# Patient Record
Sex: MALE | Race: WHITE | NOT HISPANIC OR LATINO | Employment: FULL TIME | URBAN - METROPOLITAN AREA
[De-identification: names, ages, dates, MRNs, and addresses within clinical notes are randomized per-mention and may not be internally consistent; named-entity substitution may affect disease eponyms.]

---

## 2017-09-29 ENCOUNTER — OFFICE VISIT (OUTPATIENT)
Dept: FAMILY MEDICINE | Facility: CLINIC | Age: 47
End: 2017-09-29

## 2017-09-29 VITALS
OXYGEN SATURATION: 98 % | TEMPERATURE: 98.4 F | RESPIRATION RATE: 16 BRPM | BODY MASS INDEX: 34.2 KG/M2 | HEART RATE: 52 BPM | SYSTOLIC BLOOD PRESSURE: 149 MMHG | WEIGHT: 273.6 LBS | DIASTOLIC BLOOD PRESSURE: 95 MMHG

## 2017-09-29 DIAGNOSIS — K57.32 DIVERTICULITIS OF COLON: Primary | ICD-10-CM

## 2017-09-29 DIAGNOSIS — R10.32 LLQ ABDOMINAL PAIN: ICD-10-CM

## 2017-09-29 LAB
% GRANULOCYTES: 54.3 %G (ref 40–75)
GRANULOCYTES #: 3.2 K/UL (ref 1.6–8.3)
HCT VFR BLD AUTO: 44.9 % (ref 40–53)
HEMOGLOBIN: 14.3 G/DL (ref 13.3–17.7)
LYMPHOCYTES # BLD AUTO: 2.2 K/UL (ref 0.8–5.3)
LYMPHOCYTES NFR BLD AUTO: 37.5 %L (ref 20–48)
MCH RBC QN AUTO: 28.3 PG (ref 26.5–35)
MCHC RBC AUTO-ENTMCNC: 31.8 G/DL (ref 32–36)
MCV RBC AUTO: 88.9 FL (ref 78–100)
MID #: 0.5 K/UL (ref 0–2.2)
MID %: 8.2 %M (ref 0–20)
PLATELET # BLD AUTO: 195 K/UL (ref 150–450)
RBC # BLD AUTO: 5.05 M/UL (ref 4.4–5.9)
WBC # BLD AUTO: 5.9 K/UL (ref 4–11)

## 2017-09-29 NOTE — PATIENT INSTRUCTIONS
Here is the plan from today's visit    1. Diverticulitis of colon  Monitor your symptoms at home.  If symptoms worsen, start antibiotics.  If continue to worsen after 48 hours on antibiotics go to ER.  If getting, complete the course.  If improved on its own, don't fill antibiotic  If no change in symptoms in 1-2 days, start antibiotics.  - amoxicillin-clavulanate (AUGMENTIN) 875-125 MG per tablet; Take 1 tablet by mouth 2 times daily for 14 days  Dispense: 28 tablet; Refill: 0    2. LLQ abdominal pain  - CBC with Diff Plt ([place])    Thank you for coming to [facility] today.  Lab Testing:  **If you had lab testing today and your results are reassuring or normal they will be mailed to you or sent through [employer] within 7 days.   **If the lab tests need quick action we will call you with the results.  The phone number we will call with results is # [phone] (home) [phone] (work). If this is not the best number please call our clinic and change the number.  Medication Refills:  If you need any refills please call your pharmacy and they will contact us.   If you need to  your refill at a new pharmacy, please contact the new pharmacy directly. The new pharmacy will help you get your medications transferred faster.   Scheduling:  If you have any concerns about today's visit or wish to schedule another appointment please call our office during normal business hours [phone] (8-5:00 M-F)  If a referral was made to a [facility] and you don't get a call from central scheduling please call [phone].  If a Mammogram was ordered for you at [facility] call [phone] to schedule or change your appointment.  If you had an XRay/CT/Ultrasound/MRI ordered the number is [phone] to schedule or change your radiology appointment.   Medical Concerns:  If you have urgent medical concerns please call [phone] at any time of the day.        Diverticulitis  What is  diverticulitis?   Diverticulitis is a problem that can happen if you have diverticula in your intestine. Diverticula are tiny pouches or weak areas that bulge out from the lining of the wall of the intestine. They look like small thumbs poking out of the side of the bowel. When you have diverticula in your intestines, it is called diverticulosis. When these pouches become inflamed, it is called diverticulitis.  You are more likely to have these pouches as you get older. About one-third of people over 50 and two-thirds of people over 80 have diverticulosis.  How does it occur?   It appears that the main cause of diverticular disease is too little fiber in the diet. Fiber is the part of fruits, vegetables, and grains that the body cannot digest. Fiber helps make stools soft and easy to pass. It helps prevent constipation. When you have constipation, your muscles strain to move stool that is too hard. The high pressure causes the weak spots in the colon to bulge out and become diverticula.  Diverticulitis occurs when diverticula become infected or inflamed. Doctors are not certain what causes the illness. It may begin when stool or bacteria are caught in the diverticula.  What are the symptoms?   Symptoms of diverticulitis may include:  alternating diarrhea and constipation   severe cramps in your lower left side that come and go   pain on the lower left side of the abdomen   chills or fever   nausea and vomiting   rectal bleeding.  How is it diagnosed?   Your healthcare provider will review your symptoms and examine you. You may have the following tests:  sigmoidoscopy (exam of the rectum and lower end of the large intestine with a thin, flexible, lighted tube)   colonoscopy (exam of most of the intestine with a thin, flexible, lighted tube)   barium enema or lower GI X-ray   blood tests.  How is it treated?   For an attack of acute diverticulitis, you may need to be hospitalized. Depending on how bad the attack is,  your treatment may include antibiotics, intravenous (IV) fluids, and nasogastric suction (a procedure that relieves pressure in the intestine).  If attacks are severe or frequent, you may need surgery. There are 2 types of surgery to correct the problem.   Colon resection. The area of the colon with the infected diverticula is removed and the remaining ends of the colon are sewn back together.   Colostomy. The colostomy is done to bypass the inflamed colon to help it heal. A colostomy attaches part of the healthy colon to an opening in the wall of the abdomen. Bowel movements then pass through this opening instead of the rectum. They are collected in a bag outside the body. After the colon has healed, the colostomy can be reversed. This means that you will have a second surgery to rejoin the ends of the colon to each other and will no longer have a colostomy.  How long will the effects last?   Diverticulitis is usually mild and should respond well to antibiotics and changes in diet.  How can I take care of myself?   Use a source of heat such as a hot water bottle for cramps.   If you have diarrhea, drink liquids and avoid solid foods. Try to rest until the diarrhea stops. When your symptoms are gone, eat soft, bland, low-fiber foods at first. Your healthcare provider will let you know when you should gradually begin eating a high-fiber diet.   Take all the medicine prescribed by your healthcare provider. If you stop taking antibiotics when your symptoms are gone but before the scheduled end of treatment, the symptoms may return.   If your symptoms worsen, contact your provider.  How can I help prevent recurrence of diverticulitis?   Follow your healthcare provider's prescribed treatment, including diet recommendations.   Once you are well, eat regular, nutritious meals containing high-fiber foods, such as fruits, vegetables, and whole-grain foods. Many people find fiber supplements, such as Metamucil, Citrucel, or  other psyllium products, to be helpful, but in a few cases they make constipation worse.   Drink plenty of water.   Watch for changes in your normal bowel pattern. If you are having problems with constipation or diarrhea, make an appointment to see your healthcare provider.   Get enough rest and sleep.   Exercise as recommended by your provider.   Watch to see if some foods seem to cause abdominal pain. Foods that are more likely to cause pain are popcorn kernels and other foods that may get stuck in diverticula, such as sunflower seeds, sesame seeds, and nuts. The seeds in tomatoes, zucchini, cucumbers, strawberries, and raspberries, as well as poppy seeds, are usually harmless. Keeping a food diary may help you remember what you ate a few hours before getting abdominal pain.   Contact your healthcare provider if your symptoms come back.     Published by [employer].  This content is reviewed periodically and is subject to change as new health information becomes available. The information is intended to inform and educate and is not a replacement for medical evaluation, advice, diagnosis or treatment by a healthcare professional.

## 2017-09-29 NOTE — MR AVS SNAPSHOT
After Visit Summary   9/29/2017    Lui Arrington    MRN: [id number]           Patient Information     Date Of Birth          1970        Visit Information        Provider Department      9/29/2017 8:40 AM John Blake MD [facility]        Today's Diagnoses     Diverticulitis of colon    -  1    LLQ abdominal pain          Care Instructions    Here is the plan from today's visit    1. Diverticulitis of colon  Monitor your symptoms at home.  If symptoms worsen, start antibiotics.  If continue to worsen after 48 hours on antibiotics go to ER.  If getting, complete the course.  If improved on its own, don't fill antibiotic  If no change in symptoms in 1-2 days, start antibiotics.  - amoxicillin-clavulanate (AUGMENTIN) 875-125 MG per tablet; Take 1 tablet by mouth 2 times daily for 14 days  Dispense: 28 tablet; Refill: 0    2. LLQ abdominal pain  - CBC with Diff Plt ([place])    Thank you for coming to [facility] today.  Lab Testing:  **If you had lab testing today and your results are reassuring or normal they will be mailed to you or sent through [employer] within 7 days.   **If the lab tests need quick action we will call you with the results.  The phone number we will call with results is # [phone] (home) [phone] (work). If this is not the best number please call our clinic and change the number.  Medication Refills:  If you need any refills please call your pharmacy and they will contact us.   If you need to  your refill at a new pharmacy, please contact the new pharmacy directly. The new pharmacy will help you get your medications transferred faster.   Scheduling:  If you have any concerns about today's visit or wish to schedule another appointment please call our office during normal business hours [phone] (8-5:00 M-F)  If a referral was made to a [facility] and you don't get a call from central scheduling please  call [phone].  If a Mammogram was ordered for you at [facility] call [phone] to schedule or change your appointment.  If you had an XRay/CT/Ultrasound/MRI ordered the number is [phone] to schedule or change your radiology appointment.   Medical Concerns:  If you have urgent medical concerns please call [phone] at any time of the day.        Diverticulitis  What is diverticulitis?   Diverticulitis is a problem that can happen if you have diverticula in your intestine. Diverticula are tiny pouches or weak areas that bulge out from the lining of the wall of the intestine. They look like small thumbs poking out of the side of the bowel. When you have diverticula in your intestines, it is called diverticulosis. When these pouches become inflamed, it is called diverticulitis.  You are more likely to have these pouches as you get older. About one-third of people over 50 and two-thirds of people over 80 have diverticulosis.  How does it occur?   It appears that the main cause of diverticular disease is too little fiber in the diet. Fiber is the part of fruits, vegetables, and grains that the body cannot digest. Fiber helps make stools soft and easy to pass. It helps prevent constipation. When you have constipation, your muscles strain to move stool that is too hard. The high pressure causes the weak spots in the colon to bulge out and become diverticula.  Diverticulitis occurs when diverticula become infected or inflamed. Doctors are not certain what causes the illness. It may begin when stool or bacteria are caught in the diverticula.  What are the symptoms?   Symptoms of diverticulitis may include:  alternating diarrhea and constipation   severe cramps in your lower left side that come and go   pain on the lower left side of the abdomen   chills or fever   nausea and vomiting   rectal bleeding.  How is it diagnosed?   Your healthcare provider will review your symptoms and examine you. You may  have the following tests:  sigmoidoscopy (exam of the rectum and lower end of the large intestine with a thin, flexible, lighted tube)   colonoscopy (exam of most of the intestine with a thin, flexible, lighted tube)   barium enema or lower GI X-ray   blood tests.  How is it treated?   For an attack of acute diverticulitis, you may need to be hospitalized. Depending on how bad the attack is, your treatment may include antibiotics, intravenous (IV) fluids, and nasogastric suction (a procedure that relieves pressure in the intestine).  If attacks are severe or frequent, you may need surgery. There are 2 types of surgery to correct the problem.   Colon resection. The area of the colon with the infected diverticula is removed and the remaining ends of the colon are sewn back together.   Colostomy. The colostomy is done to bypass the inflamed colon to help it heal. A colostomy attaches part of the healthy colon to an opening in the wall of the abdomen. Bowel movements then pass through this opening instead of the rectum. They are collected in a bag outside the body. After the colon has healed, the colostomy can be reversed. This means that you will have a second surgery to rejoin the ends of the colon to each other and will no longer have a colostomy.  How long will the effects last?   Diverticulitis is usually mild and should respond well to antibiotics and changes in diet.  How can I take care of myself?   Use a source of heat such as a hot water bottle for cramps.   If you have diarrhea, drink liquids and avoid solid foods. Try to rest until the diarrhea stops. When your symptoms are gone, eat soft, bland, low-fiber foods at first. Your healthcare provider will let you know when you should gradually begin eating a high-fiber diet.   Take all the medicine prescribed by your healthcare provider. If you stop taking antibiotics when your symptoms are gone but before the scheduled end of treatment, the symptoms may return.    If your symptoms worsen, contact your provider.  How can I help prevent recurrence of diverticulitis?   Follow your healthcare provider's prescribed treatment, including diet recommendations.   Once you are well, eat regular, nutritious meals containing high-fiber foods, such as fruits, vegetables, and whole-grain foods. Many people find fiber supplements, such as Metamucil, Citrucel, or other psyllium products, to be helpful, but in a few cases they make constipation worse.   Drink plenty of water.   Watch for changes in your normal bowel pattern. If you are having problems with constipation or diarrhea, make an appointment to see your healthcare provider.   Get enough rest and sleep.   Exercise as recommended by your provider.   Watch to see if some foods seem to cause abdominal pain. Foods that are more likely to cause pain are popcorn kernels and other foods that may get stuck in diverticula, such as sunflower seeds, sesame seeds, and nuts. The seeds in tomatoes, zucchini, cucumbers, strawberries, and raspberries, as well as poppy seeds, are usually harmless. Keeping a food diary may help you remember what you ate a few hours before getting abdominal pain.   Contact your healthcare provider if your symptoms come back.     Published by [employer].  This content is reviewed periodically and is subject to change as new health information becomes available. The information is intended to inform and educate and is not a replacement for medical evaluation, advice, diagnosis or treatment by a healthcare professional.                        Follow-ups after your visit        Who to contact     Please call your clinic at [phone] to:    Ask questions about your health    Make or cancel appointments    Discuss your medicines    Learn about your test results    Speak to your doctor   If you have compliments or concerns about an experience at your clinic, or if you wish to file a complaint, please contact [facility] Patient Relations at [phone] or email us at [email or web address]         Additional Information About Your Visit        [employer] Information     [employer] gives you secure access to your electronic health record. If you see a primary care provider, you can also send messages to your care team and make appointments. If you have questions, please call your primary care clinic.  If you do not have a primary care provider, please call [phone] and they will assist you.      [employer] is an electronic gateway that provides easy, online access to your medical records. With [employer], you can request a clinic appointment, read your test results, renew a prescription or communicate with your care team.     To access your existing account, please contact your [facility] or call [phone] for assistance.        Care EveryWhere ID     This is your Care EveryWhere ID. This could be used by other organizations to access your [place] medical records  [id number]        Your Vitals Were     Pulse Temperature Respirations Pulse Oximetry BMI (Body Mass Index)       52 98.4  F (36.9  C) (Oral) 16 98% 34.2 kg/m2        Blood Pressure from Last 3 Encounters:   09/29/17 (!) 149/95   08/10/15 (!) 138/93   07/17/15 (!) 153/96    Weight from Last 3 Encounters:   09/29/17 273 lb 9.6 oz (124.1 kg)   08/10/15 272 lb (123.4 kg)   07/17/15 275 lb 12.8 oz (125.1 kg)              We Performed the Following     CBC with Diff Plt ([place])          Today's Medication Changes          These changes are accurate as of: 9/29/17  9:49 AM.  If you have any questions, ask your nurse or doctor.               These medicines have changed or have updated prescriptions.        Dose/Directions    * amoxicillin-clavulanate 875-125 MG per tablet   Commonly known as:  AUGMENTIN   This may have changed:  Another medication with the same name was added. Make sure you understand how and  when to take each.   Used for:  Diverticulitis of colon   Changed by:  Richy Dorsey MD        Dose:  1 tablet   Take 1 tablet by mouth 2 times daily   Quantity:  28 tablet   Refills:  0       * amoxicillin-clavulanate 875-125 MG per tablet   Commonly known as:  AUGMENTIN   This may have changed:  Another medication with the same name was added. Make sure you understand how and when to take each.   Used for:  Otitis media   Changed by:  Asuncion Bae MD        Dose:  1 tablet   Take 1 tablet by mouth 2 times daily   Quantity:  20 tablet   Refills:  0       * amoxicillin-clavulanate 875-125 MG per tablet   Commonly known as:  AUGMENTIN   This may have changed:  You were already taking a medication with the same name, and this prescription was added. Make sure you understand how and when to take each.   Used for:  Diverticulitis of colon   Changed by:  John Blake MD        Dose:  1 tablet   Take 1 tablet by mouth 2 times daily for 14 days   Quantity:  28 tablet   Refills:  0       * Notice:  This list has 3 medication(s) that are the same as other medications prescribed for you. Read the directions carefully, and ask your doctor or other care provider to review them with you.         Where to get your medicines      Some of these will need a paper prescription and others can be bought over the counter.  Ask your nurse if you have questions.     Bring a paper prescription for each of these medications     amoxicillin-clavulanate 875-125 MG per tablet                Primary Care Provider Office Phone # Fax #    Richy Dorsey [phone]       [address] [address]        Equal Access to Services     [facility] AH: Hadii shannan floreso Soelmo, waaxda luqadaha, qaybta kaalmada diane, beto colbert. So [facility] [phone].    ATENCIÓN: Si habla español, tiene a cortez disposición servicios gratuitos de asistencia lingüística. Llame  al [phone].    We comply with applicable federal civil rights laws and [place] laws. We do not discriminate on the basis of race, color, national origin, age, disability sex, sexual orientation or gender identity.            Thank you!     Thank you for choosing [facility]  for your care. Our goal is always to provide you with excellent care. Hearing back from our patients is one way we can continue to improve our services. Please take a few minutes to complete the written survey that you may receive in the mail after your visit with us. Thank you!             Your Updated Medication List - Protect others around you: Learn how to safely use, store and throw away your medicines at www.disposemymeds.org.          This list is accurate as of: 9/29/17  9:49 AM.  Always use your most recent med list.                   Brand Name Dispense Instructions for use Diagnosis    * amoxicillin-clavulanate 875-125 MG per tablet    AUGMENTIN    28 tablet    Take 1 tablet by mouth 2 times daily    Diverticulitis of colon       * amoxicillin-clavulanate 875-125 MG per tablet    AUGMENTIN    20 tablet    Take 1 tablet by mouth 2 times daily    Otitis media       * amoxicillin-clavulanate 875-125 MG per tablet    AUGMENTIN    28 tablet    Take 1 tablet by mouth 2 times daily for 14 days    Diverticulitis of colon       clotrimazole 10 MG mary     30 Mary    Place 1 Mary (10 mg) inside cheek 3 times daily    Diverticulitis of colon       fluconazole 100 MG tablet    DIFLUCAN    15 tablet    Take 1 tablet (100 mg) by mouth daily    Diverticulitis of colon with perforation       fluticasone 50 MCG/ACT spray    FLONASE    1 Package    Spray 2 sprays in nostril daily    Seasonal allergies       metroNIDAZOLE 500 MG tablet    FLAGYL    20 tablet    Take 1 tablet (500 mg) by mouth 3 times daily    Diverticulitis of colon       neomycin-polymyxin-hydrocortisone 3.5-41651-9 otic suspension    CORTISPORIN    10  mL    Place 4 drops into both ears 4 times daily    Otitis externa, bilateral       nystatin 902721 UNIT/ML suspension    MYCOSTATIN    60 mL    Take 5 mLs (500,000 Units) by mouth 4 times daily    Thrush       polyethylene glycol powder    MIRALAX    510 g    Take 17 g by mouth daily    Constipation       predniSONE 10 MG tablet    DELTASONE    18 tablet    Take 4 tablets (40 mg) by mouth daily    Urticaria, unspecified       * Notice:  This list has 3 medication(s) that are the same as other medications prescribed for you. Read the directions carefully, and ask your doctor or other care provider to review them with you.

## 2017-09-29 NOTE — PROGRESS NOTES
HPI:       Lui Arrington is a 47 year old who presents for the following  Patient presents with:  Infection: diverticulitis lower left /mid abdominal pain. Been on a liquid diet for 3 days. Flare up started 4 days ago. Constipation, gas pain.       ABDOMINAL   Pain     Onset: 4 days     Description:   Character: Sharp, Dull ache and Cramping  Location:  suprapubic and LLQ  Radiation: None    Intensity: mild    Progression of Symptoms:  Mildly improved    Accompanying Signs & Symptoms:  Fever/Chills?: No   Gas/Bloating: No   Dysuria:No   Hematuria: No   Nausea: No   Vomiting: No   Diarrhea:No   Constipation: YES - still having stool but not emptying fully.  Treated with Miralax    History:           Trauma: No   Previous similar pain:  YES  When diagnosed with diverticulitis  Previous tests done: CT    Precipitating factors:   Does the pain change with:     Food?: no     BM?: no     Urination:no     What makes it better?:  Unsure - feels much better today than yesterday     Problem, Medication and Allergy Lists were reviewed and are current.  Patient is an established patient of this clinic.         Review of Systems:   Review of Systems          Physical Exam:   Patient Vitals for the past 24 hrs:   BP Temp Temp src Pulse Resp SpO2 Weight   09/29/17 0900 (!) 149/95 - - - - - -   09/29/17 0858 (!) 160/98 98.4  F (36.9  C) Oral 52 16 98 % 273 lb 9.6 oz (124.1 kg)     Body mass index is 34.2 kg/(m^2).  Vital signs normal except  Blood pressure     Physical Exam   Constitutional: He appears well-developed and well-nourished.   HENT:   Head: Normocephalic and atraumatic.   Eyes: Conjunctivae are normal. No scleral icterus.   Neck: Neck supple.   Cardiovascular: Normal rate, regular rhythm and normal heart sounds.    No murmur heard.  Pulmonary/Chest: Effort normal and breath sounds normal.   Abdominal: Soft. Normal appearance and bowel sounds are normal. He exhibits no distension and no mass. There is no  hepatosplenomegaly. There is tenderness (LLQ and suprapubic). There is no rigidity, no rebound, no guarding and no CVA tenderness.   Lymphadenopathy:     He has no cervical adenopathy.   Skin: Skin is warm and dry. No rash noted.   Psychiatric: He has a normal mood and affect.   Nursing note and vitals reviewed.      Results:      Results from the last 24 hours  Results for orders placed or performed in visit on 09/29/17 (from the past 24 hour(s))   CBC with Diff Plt ([facility])   Result Value Ref Range    WBC 5.9 4.0 - 11.0 K/uL    Lymphocytes # 2.2 0.8 - 5.3 K/uL    % Lymphocytes 37.5 20.0 - 48.0 %L    Mid # 0.5 0.0 - 2.2 K/uL    Mid % 8.2 0.0 - 20.0 %M    GRANULOCYTES # 3.2 1.6 - 8.3 K/uL    % Granulocytes 54.3 40.0 - 75.0 %G    RBC 5.05 4.40 - 5.90 M/uL    Hemoglobin 14.3 13.3 - 17.7 g/dL    Hematocrit 44.9 40.0 - 53.0 %    MCV 88.9 78.0 - 100.0 fL    MCH 28.3 26.5 - 35.0 pg    MCHC 31.8 (L) 32.0 - 36.0 g/dL    Platelets 195.0 150.0 - 450.0 K/uL     Assessment and Plan     Patient given printed prescription for Augmentin 875 mg two times a day x 14 days and the following instructions:    Patient Instructions   Here is the plan from today's visit    1. Diverticulitis of colon  Monitor your symptoms at home.  If symptoms worsen, start antibiotics.  If continue to worsen after 48 hours on antibiotics go to ER.  If getting better, complete the course of antibiotics.  If improved on its own, don't fill antibiotic  If no change in symptoms in 1-2 days, start antibiotics and follow instructions above  - amoxicillin-clavulanate (AUGMENTIN) 875-125 MG per tablet; Take 1 tablet by mouth 2 times daily for 14 days  Dispense: 28 tablet; Refill: 0    2. LLQ abdominal pain  - CBC with Diff Plt ([facility])    Options for treatment and follow-up care were reviewed with the patient. Lui Arrington  engaged in the decision making process and verbalized understanding of the options discussed and agreed with the final  plan.    John Blake MD

## 2018-08-24 ENCOUNTER — OFFICE VISIT (OUTPATIENT)
Dept: FAMILY MEDICINE | Facility: CLINIC | Age: 48
End: 2018-08-24
Payer: COMMERCIAL

## 2018-08-24 VITALS
OXYGEN SATURATION: 99 % | TEMPERATURE: 98.1 F | DIASTOLIC BLOOD PRESSURE: 87 MMHG | WEIGHT: 281.2 LBS | HEART RATE: 58 BPM | BODY MASS INDEX: 35.15 KG/M2 | SYSTOLIC BLOOD PRESSURE: 143 MMHG | RESPIRATION RATE: 18 BRPM

## 2018-08-24 DIAGNOSIS — H69.91 DYSFUNCTION OF RIGHT EUSTACHIAN TUBE: ICD-10-CM

## 2018-08-24 DIAGNOSIS — I10 BENIGN ESSENTIAL HYPERTENSION: Primary | ICD-10-CM

## 2018-08-24 DIAGNOSIS — E66.811 OBESITY (BMI 30.0-34.9): ICD-10-CM

## 2018-08-24 LAB
ALBUMIN SERPL-MCNC: 4.4 MG/DL (ref 3.8–5)
ALP SERPL-CCNC: 57.1 U/L (ref 31.7–110.7)
ALT SERPL-CCNC: 19.5 U/L (ref 0–45)
AST SERPL-CCNC: 11.7 U/L (ref 0–55)
BILIRUB SERPL-MCNC: 0.6 MG/DL (ref 0.2–1.3)
BUN SERPL-MCNC: 15.7 MG/DL (ref 7–21)
CALCIUM SERPL-MCNC: 9.2 MG/DL (ref 8.5–10.1)
CHLORIDE SERPLBLD-SCNC: 106.5 MMOL/L (ref 98–110)
CHOLEST SERPL-MCNC: 141.9 MG/DL (ref 0–200)
CHOLEST/HDLC SERPL: 3.4 {RATIO} (ref 0–5)
CO2 SERPL-SCNC: 28.5 MMOL/L (ref 20–32)
CREAT SERPL-MCNC: 0.8 MG/DL (ref 0.7–1.3)
GFR SERPL CREATININE-BSD FRML MDRD: >90 ML/MIN/1.7 M2
GLUCOSE SERPL-MCNC: 101.5 MG'DL (ref 70–99)
HBA1C MFR BLD: 5 % (ref 4.1–5.7)
HDLC SERPL-MCNC: 42 MG/DL
LDLC SERPL CALC-MCNC: 78 MG/DL (ref 0–129)
POTASSIUM SERPL-SCNC: 5.2 MMOL/DL (ref 3.3–4.5)
PROT SERPL-MCNC: 7 G/DL (ref 6.8–8.8)
SODIUM SERPL-SCNC: 141.3 MMOL/L (ref 132.6–141.4)
TRIGL SERPL-MCNC: 110.6 MG/DL (ref 0–150)
VLDL CHOLESTEROL: 22.1 MG/DL (ref 7–32)

## 2018-08-24 RX ORDER — MULTIPLE VITAMINS W/ MINERALS TAB 9MG-400MCG
1 TAB ORAL DAILY
COMMUNITY
End: 2018-08-24

## 2018-08-24 RX ORDER — LISINOPRIL 5 MG/1
5 TABLET ORAL DAILY
Qty: 30 TABLET | Refills: 1 | Status: SHIPPED | OUTPATIENT
Start: 2018-08-24 | End: 2018-10-24

## 2018-08-24 ASSESSMENT — ENCOUNTER SYMPTOMS
ARTHRALGIAS: 0
ADENOPATHY: 0
BLOOD IN STOOL: 0
ABDOMINAL DISTENTION: 0
COLOR CHANGE: 0
EYE REDNESS: 0
PALPITATIONS: 0
VOMITING: 0
FEVER: 0
WHEEZING: 0
CHEST TIGHTNESS: 0
SLEEP DISTURBANCE: 0
DIARRHEA: 0
SHORTNESS OF BREATH: 0
DYSURIA: 0
DYSPHORIC MOOD: 0
NUMBNESS: 0
MYALGIAS: 0
EYE DISCHARGE: 0
ABDOMINAL PAIN: 0
ACTIVITY CHANGE: 0
EYES NEGATIVE: 1
SINUS PRESSURE: 0
DIFFICULTY URINATING: 0
POLYDIPSIA: 0
NERVOUS/ANXIOUS: 0
HEADACHES: 0
CONSTIPATION: 0
TROUBLE SWALLOWING: 0
WEAKNESS: 0
PHOTOPHOBIA: 0
FATIGUE: 0
NECK STIFFNESS: 0
COUGH: 0

## 2018-08-24 NOTE — MR AVS SNAPSHOT
After Visit Summary   8/24/2018    Lui Arrington    MRN: [id number]           Patient Information     Date Of Birth          1970        Visit Information        Provider Department      8/24/2018 9:00 AM Richy Dorsey MD [facility]        Today's Diagnoses     Benign essential hypertension    -  1    Dysfunction of right eustachian tube        Obesity (BMI 30.0-34.9)          Care Instructions    Here is the plan from today's visit    1. Benign essential hypertension  Discussed with and benefits of treating versus not treating hypertension potential adverse side effects of medication restarting asked him to follow-up for rechecking blood pressure and BMP.  - lisinopril (PRINIVIL/ZESTRIL) 5 MG tablet; Take 1 tablet (5 mg) by mouth daily  Dispense: 30 tablet; Refill: 1  - Comprehensive Metabolic Panel ([facility])  - Lipid Cascade ([facility])  - Hemoglobin A1c ([facility])    2. Dysfunction of right eustachian tube  Likely the result of allergic rhinitis.  Advised treatment with OTC Flonase, OTC Allegra and OTC Sudafed.    3. Obesity (BMI 30.0-34.9)  Discussed in detail regarding starting a diet in defining abstinence for himself.  Advised him he could either go with a low carbohydrate diet or Mediterranean diet.  Or go with the program at [employer] since he has signed up already.  Advised him to follow-up in about 4-6 weeks to see how things are going and consider adjunctive medication if things are if he is not progressing.      Please call or return to clinic if your symptoms don't go away.    Follow up plan  Please make a clinic appointment for follow up with me (RICHY DORSEY) in 2-4 weeks for recheck.    Thank you for coming to [facility] today.  Lab Testing:  **If you had lab testing today and your results are reassuring or normal they will be mailed to you or sent through [employer] within 7 days.   **If the lab tests need quick action we will call you with the  results.  The phone number we will call with results is # [phone] (home) [phone] (work). If this is not the best number please call our clinic and change the number.  Medication Refills:  If you need any refills please call your pharmacy and they will contact us.   If you need to  your refill at a new pharmacy, please contact the new pharmacy directly. The new pharmacy will help you get your medications transferred faster.   Scheduling:  If you have any concerns about today's visit or wish to schedule another appointment please call our office during normal business hours [phone] (8-5:00 M-F)  If a referral was made to a [facility] and you don't get a call from central scheduling please call [phone].  If a Mammogram was ordered for you at [facility] call [phone] to schedule or change your appointment.  If you had an XRay/CT/Ultrasound/MRI ordered the number is [phone] to schedule or change your radiology appointment.   Medical Concerns:  If you have urgent medical concerns please call [phone] at any time of the day.    Richy Dorsey MD            Follow-ups after your visit        Follow-up notes from your care team     Return in about 4 weeks (around 9/21/2018).      Your next 10 appointments already scheduled     Sep 07, 2018  9:20 AM CDT   Return Visit with Richy Dorsey MD   [facility])    [address],  [address]  [facility] [address]   [phone]              Who to contact     Please call your clinic at [phone] to:    Ask questions about your health    Make or cancel appointments    Discuss your medicines    Learn about your test results    Speak to your doctor            Additional Information About Your Visit        [employer] Information     [employer] gives you secure access to your electronic health record. If you see a primary care provider, you can also send messages to your  care team and make appointments. If you have questions, please call your primary care clinic.  If you do not have a primary care provider, please call [phone] and they will assist you.      [employer] is an electronic gateway that provides easy, online access to your medical records. With [employer], you can request a clinic appointment, read your test results, renew a prescription or communicate with your care team.     To access your existing account, please contact your [facility] or call [phone] for assistance.        Care EveryWhere ID     This is your Care EveryWhere ID. This could be used by other organizations to access your [facility] medical records  [id number]        Your Vitals Were     Pulse Temperature Respirations Pulse Oximetry BMI (Body Mass Index)       58 98.1  F (36.7  C) (Oral) 18 99% 35.15 kg/m2        Blood Pressure from Last 3 Encounters:   08/24/18 143/87   09/29/17 (!) 149/95   08/10/15 (!) 138/93    Weight from Last 3 Encounters:   08/24/18 281 lb 3.2 oz (127.6 kg)   09/29/17 273 lb 9.6 oz (124.1 kg)   08/10/15 272 lb (123.4 kg)              We Performed the Following     Comprehensive Metabolic Panel ([facility])     Hemoglobin A1c ([facility])     Lipid Cascade ([facility])          Today's Medication Changes          These changes are accurate as of 8/24/18  5:37 PM.  If you have any questions, ask your nurse or doctor.               Start taking these medicines.        Dose/Directions    lisinopril 5 MG tablet   Commonly known as:  PRINIVIL/ZESTRIL   Used for:  Benign essential hypertension   Started by:  Richy Dorsey MD        Dose:  5 mg   Take 1 tablet (5 mg) by mouth daily   Quantity:  30 tablet   Refills:  1         Stop taking these medicines if you haven't already. Please contact your care team if you have questions.     amoxicillin-clavulanate 875-125 MG per tablet   Commonly known as:  AUGMENTIN   Stopped by:  Richy Dorsey MD            clotrimazole 10 MG eder   Stopped by:  Richy Dorsey MD           fluconazole 100 MG tablet   Commonly known as:  DIFLUCAN   Stopped by:  Richy Dorsey MD           fluticasone 50 MCG/ACT spray   Commonly known as:  FLONASE   Stopped by:  Richy Dorsey MD           metroNIDAZOLE 500 MG tablet   Commonly known as:  FLAGYL   Stopped by:  Richy Dorsey MD           Multi-vitamin Tabs tablet   Stopped by:  Richy Dorsey MD           neomycin-polymyxin-hydrocortisone 3.5-49335-4 otic suspension   Commonly known as:  CORTISPORIN   Stopped by:  Richy Dorsey MD           nystatin 593421 UNIT/ML suspension   Commonly known as:  MYCOSTATIN   Stopped by:  Richy Dorsey MD           polyethylene glycol powder   Commonly known as:  MIRALAX   Stopped by:  Richy Dorsey MD           predniSONE 10 MG tablet   Commonly known as:  DELTASONE   Stopped by:  Richy Dorsey MD                Where to get your medicines      These medications were sent to [facility]/pharmacy #[id number] - [place], [place] - [address] AT [facility]  [address] [place] [address]    Hours:  24-hours Phone:  [phone]     lisinopril 5 MG tablet                Primary Care Provider Office Phone # Fax #    Richy Dorsey [phone]       [address] [address]        Equal Access to Services     [facility] : Hadii aad ku hadasho Soomaali, waaxda luqadaha, qaybta kaalmada adeegyada, beto waktins . So [facility] [phone].    ATENCIÓN: Si habla español, tiene a cortez disposición servicios gratuitos de asistencia lingüística. Elba al [phone].    We comply with applicable federal civil rights laws and [place] laws. We do not discriminate on the basis of race, color, national origin, age, disability, sex, sexual orientation, or gender identity.            Thank you!     Thank you for choosing [facility]  for your care. Our goal  is always to provide you with excellent care. Hearing back from our patients is one way we can continue to improve our services. Please take a few minutes to complete the written survey that you may receive in the mail after your visit with us. Thank you!             Your Updated Medication List - Protect others around you: Learn how to safely use, store and throw away your medicines at www.disposemymeds.org.          This list is accurate as of 8/24/18  5:37 PM.  Always use your most recent med list.                   Brand Name Dispense Instructions for use Diagnosis    lisinopril 5 MG tablet    PRINIVIL/ZESTRIL    30 tablet    Take 1 tablet (5 mg) by mouth daily    Benign essential hypertension

## 2018-08-24 NOTE — PROGRESS NOTES
"       HPI       Lui Arrington is a 48 year old  who presents for   Chief Complaint   Patient presents with     Hypertension     Ear Problem     Right ear plugged, left clogged      Weight Problem     concerns, unable to lose weight      The ASCVD Risk score (Owasso DC Jr, et al., 2013) failed to calculate for the following reasons:    Cannot find a previous HDL lab    Cannot find a previous total cholesterol lab      Hypertension-averaging 140/90   Has tried to lose weight   BP has continued to be elevated.  Over the last several years.  He denies any symptoms of headache difficulty urination no no blood in his urine and is generally feeling well.    Ear popping   right ear is plugged intermittently, left ear pops, he also reports nasal congestion and dullness in his ear that he can clear with blowing but the closes down again.  Has been going on for about 2 months.      Weight Management-  Has lost weight and regained regained it over about 6 months..   Carbohydrates, fast food, \"addictive style relationship to fast food\"  Night time snacking.  He is interested in considering medication and interested in working with a diet he is a member of [employer] though he has not attended very regularly some challenges are he is job where he is traveling quite a bit in and out with others and so does not always the healthiest place to eat.     +++++++      Problem, Medication and Allergy Lists were reviewed and updated if needed..    Patient is an established patient of this clinic..         Review of Systems:   Review of Systems   Constitutional: Negative for activity change, fatigue and fever.   HENT: Negative.  Negative for ear pain, sinus pressure and trouble swallowing.         See HPI   Eyes: Negative.  Negative for photophobia, discharge, redness and visual disturbance.   Respiratory: Negative for cough, chest tightness, shortness of breath and wheezing.    Cardiovascular: Negative for chest pain and " palpitations.   Gastrointestinal: Negative for abdominal distention, abdominal pain, blood in stool, constipation, diarrhea and vomiting.   Endocrine: Negative for polydipsia and polyuria.   Genitourinary: Negative for difficulty urinating and dysuria.   Musculoskeletal: Negative for arthralgias, myalgias and neck stiffness.   Skin: Negative for color change and rash.   Allergic/Immunologic: Negative for environmental allergies.   Neurological: Negative for weakness, numbness and headaches.   Hematological: Negative for adenopathy.   Psychiatric/Behavioral: Negative for dysphoric mood and sleep disturbance. The patient is not nervous/anxious.             Physical Exam:     Vitals:    08/24/18 0911 08/24/18 0918   BP: 149/90 143/87   Pulse: 58    Resp: 18    Temp: 98.1  F (36.7  C)    TempSrc: Oral    SpO2: 99%    Weight: 281 lb 3.2 oz (127.6 kg)      Body mass index is 35.15 kg/(m^2).  Vitals were reviewed and were normal     Physical Exam   Constitutional: He is oriented to person, place, and time. Vital signs are normal. He appears well-developed and well-nourished. He is active. He does not appear ill. No distress.   HENT:   Head: Normocephalic.   Right Ear: Tympanic membrane and external ear normal.   Left Ear: Tympanic membrane and external ear normal.   Nose: Mucosal edema and rhinorrhea present. No sinus tenderness. Right sinus exhibits no maxillary sinus tenderness and no frontal sinus tenderness. Left sinus exhibits no maxillary sinus tenderness and no frontal sinus tenderness.   Mouth/Throat: Oropharynx is clear and moist.   Eyes: Conjunctivae and EOM are normal. Pupils are equal, round, and reactive to light. No scleral icterus.   Neck: Normal range of motion. No thyromegaly present.   Cardiovascular: Normal rate, regular rhythm and normal heart sounds.    No murmur heard.  Pulmonary/Chest: Effort normal and breath sounds normal. No stridor. No respiratory distress. He has no wheezes.   Abdominal: Soft.  Bowel sounds are normal. There is no splenomegaly or hepatomegaly. There is no tenderness.   Musculoskeletal: He exhibits no edema.   Lymphadenopathy:     He has no cervical adenopathy.   Neurological: He is alert and oriented to person, place, and time.   Skin: Skin is warm and dry. He is not diaphoretic.   Psychiatric: He has a normal mood and affect. His behavior is normal. Judgment and thought content normal.   Vitals reviewed.        Results:      Results from this visit  Results for orders placed or performed in visit on 08/24/18   Comprehensive Metabolic Panel ([facility])   Result Value Ref Range    Albumin 4.4 3.8 - 5.0 mg/dL    Alkaline Phosphatase 57.1 31.7 - 110.7 U/L    ALT 19.5 0.0 - 45.0 U/L    AST 11.7 0.0 - 55.0 U/L    Bilirubin Total 0.6 0.2 - 1.3 mg/dL    Urea Nitrogen 15.7 7.0 - 21.0 mg/dL    Calcium 9.2 8.5 - 10.1 mg/dL    Chloride 106.5 98.0 - 110.0 mmol/L    Carbon Dioxide 28.5 20.0 - 32.0 mmol/L    Creatinine 0.8 0.7 - 1.3 mg/dL    Glucose 101.5 (H) 70.0 - 99.0 mg'dL    Potassium 5.2 (H) 3.3 - 4.5 mmol/dL    Sodium 141.3 132.6 - 141.4 mmol/L    Protein Total 7.0 6.8 - 8.8 g/dL    GFR Estimate >90 >60.0 mL/min/1.7 m2    GFR Estimate If Black >90 >60.0 mL/min/1.7 m2   Lipid Cascade ([facility])   Result Value Ref Range    Cholesterol 141.9 0.0 - 200.0 mg/dL    Cholesterol/HDL Ratio 3.4 0.0 - 5.0    HDL Cholesterol 42.0 >40.0 mg/dL    LDL Cholesterol Calculated 78 0 - 129 mg/dL    Triglycerides 110.6 0.0 - 150.0 mg/dL    VLDL Cholesterol 22.1 7.0 - 32.0 mg/dL   Hemoglobin A1c ([facility])   Result Value Ref Range    Hemoglobin A1C 5.0 4.1 - 5.7 %       Assessment and Plan        Patient Instructions   Here is the plan from today's visit    1. Benign essential hypertension  Discussed with and benefits of treating versus not treating hypertension potential adverse side effects of medication restarting asked him to follow-up for rechecking blood pressure and BMP.  - lisinopril (PRINIVIL/ZESTRIL) 5 MG tablet;  Take 1 tablet (5 mg) by mouth daily  Dispense: 30 tablet; Refill: 1  - Comprehensive Metabolic Panel ([facility])  - Lipid Cascade ([facility])  - Hemoglobin A1c ([facility])    2. Dysfunction of right eustachian tube  Likely the result of allergic rhinitis.  Advised treatment with OTC Flonase, OTC Allegra and OTC Sudafed.    3. Obesity (BMI 30.0-34.9)  Discussed in detail regarding starting a diet in defining abstinence for himself.  Advised him he could either go with a low carbohydrate diet or Mediterranean diet.  Or go with the program at [facility] since he has signed up already.  Advised him to follow-up in about 4-6 weeks to see how things are going and consider adjunctive medication if things are if he is not progressing.      Please call or return to clinic if your symptoms don't go away.    Follow up plan  Please make a clinic appointment for follow up with me (RICHY DORSEY) in 2-4 weeks for recheck.         There are no discontinued medications.    Options for treatment and follow-up care were reviewed with the patient. Lui Arrington  engaged in the decision making process and verbalized understanding of the options discussed and agreed with the final plan.    Richy Dorsey MD

## 2018-09-03 ENCOUNTER — HOSPITAL ENCOUNTER (INPATIENT)
Facility: CLINIC | Age: 48
LOS: 1 days | Discharge: HOME | DRG: 093 | End: 2018-09-06
Attending: EMERGENCY MEDICINE | Admitting: FAMILY MEDICINE
Payer: COMMERCIAL

## 2018-09-03 ENCOUNTER — APPOINTMENT (OUTPATIENT)
Dept: GENERAL RADIOLOGY | Facility: CLINIC | Age: 48
DRG: 093 | End: 2018-09-03
Attending: EMERGENCY MEDICINE
Payer: COMMERCIAL

## 2018-09-03 DIAGNOSIS — I10 ESSENTIAL HYPERTENSION: Primary | ICD-10-CM

## 2018-09-03 DIAGNOSIS — R07.9 CHEST PAIN, UNSPECIFIED TYPE: ICD-10-CM

## 2018-09-03 DIAGNOSIS — R06.02 SOB (SHORTNESS OF BREATH): ICD-10-CM

## 2018-09-03 DIAGNOSIS — R20.2 PARESTHESIAS IN LEFT HAND: ICD-10-CM

## 2018-09-03 LAB
ALBUMIN SERPL-MCNC: 4 G/DL (ref 3.4–5)
ALP SERPL-CCNC: 64 U/L (ref 40–150)
ALT SERPL W P-5'-P-CCNC: 29 U/L (ref 0–70)
ANION GAP SERPL CALCULATED.3IONS-SCNC: 6 MMOL/L (ref 3–14)
APTT PPP: 30 SEC (ref 22–37)
AST SERPL W P-5'-P-CCNC: 20 U/L (ref 0–45)
BASOPHILS # BLD AUTO: 0 10E9/L (ref 0–0.2)
BASOPHILS NFR BLD AUTO: 0.5 %
BILIRUB SERPL-MCNC: 0.6 MG/DL (ref 0.2–1.3)
BUN SERPL-MCNC: 15 MG/DL (ref 7–30)
CALCIUM SERPL-MCNC: 8.8 MG/DL (ref 8.5–10.1)
CHLORIDE SERPL-SCNC: 106 MMOL/L (ref 94–109)
CO2 SERPL-SCNC: 27 MMOL/L (ref 20–32)
CREAT SERPL-MCNC: 0.86 MG/DL (ref 0.66–1.25)
D DIMER PPP FEU-MCNC: 0.5 UG/ML FEU (ref 0–0.5)
DIFFERENTIAL METHOD BLD: ABNORMAL
EOSINOPHIL # BLD AUTO: 0.2 10E9/L (ref 0–0.7)
EOSINOPHIL NFR BLD AUTO: 2.9 %
ERYTHROCYTE [DISTWIDTH] IN BLOOD BY AUTOMATED COUNT: 13.7 % (ref 10–15)
GFR SERPL CREATININE-BSD FRML MDRD: >90 ML/MIN/1.7M2
GLUCOSE BLDC GLUCOMTR-MCNC: 155 MG/DL (ref 70–99)
GLUCOSE SERPL-MCNC: 93 MG/DL (ref 70–99)
HCT VFR BLD AUTO: 45.6 % (ref 40–53)
HGB BLD-MCNC: 15.2 G/DL (ref 13.3–17.7)
IMM GRANULOCYTES # BLD: 0 10E9/L (ref 0–0.4)
IMM GRANULOCYTES NFR BLD: 0.5 %
INR PPP: 0.92 (ref 0.86–1.14)
LYMPHOCYTES # BLD AUTO: 2.7 10E9/L (ref 0.8–5.3)
LYMPHOCYTES NFR BLD AUTO: 40.8 %
MCH RBC QN AUTO: 28.3 PG (ref 26.5–33)
MCHC RBC AUTO-ENTMCNC: 33.3 G/DL (ref 31.5–36.5)
MCV RBC AUTO: 85 FL (ref 78–100)
MONOCYTES # BLD AUTO: 0.6 10E9/L (ref 0–1.3)
MONOCYTES NFR BLD AUTO: 9.2 %
NEUTROPHILS # BLD AUTO: 3.1 10E9/L (ref 1.6–8.3)
NEUTROPHILS NFR BLD AUTO: 46.1 %
NRBC # BLD AUTO: 0 10*3/UL
NRBC BLD AUTO-RTO: 0 /100
NT-PROBNP SERPL-MCNC: 61 PG/ML (ref 0–900)
PLATELET # BLD AUTO: 149 10E9/L (ref 150–450)
POTASSIUM SERPL-SCNC: 4 MMOL/L (ref 3.4–5.3)
PROT SERPL-MCNC: 8 G/DL (ref 6.8–8.8)
RBC # BLD AUTO: 5.38 10E12/L (ref 4.4–5.9)
SODIUM SERPL-SCNC: 139 MMOL/L (ref 133–144)
TROPONIN I BLD-MCNC: 0.01 UG/L (ref 0–0.1)
TROPONIN I SERPL-MCNC: <0.015 UG/L (ref 0–0.04)
WBC # BLD AUTO: 6.6 10E9/L (ref 4–11)

## 2018-09-03 PROCEDURE — 84443 ASSAY THYROID STIM HORMONE: CPT | Performed by: NURSE PRACTITIONER

## 2018-09-03 PROCEDURE — 93005 ELECTROCARDIOGRAM TRACING: CPT | Mod: 76 | Performed by: EMERGENCY MEDICINE

## 2018-09-03 PROCEDURE — 25000132 ZZH RX MED GY IP 250 OP 250 PS 637: Performed by: NURSE PRACTITIONER

## 2018-09-03 PROCEDURE — 93010 ELECTROCARDIOGRAM REPORT: CPT | Mod: 76 | Performed by: EMERGENCY MEDICINE

## 2018-09-03 PROCEDURE — 84484 ASSAY OF TROPONIN QUANT: CPT

## 2018-09-03 PROCEDURE — G0378 HOSPITAL OBSERVATION PER HR: HCPCS

## 2018-09-03 PROCEDURE — 40000275 ZZH STATISTIC RCP TIME EA 10 MIN

## 2018-09-03 PROCEDURE — 84484 ASSAY OF TROPONIN QUANT: CPT | Performed by: EMERGENCY MEDICINE

## 2018-09-03 PROCEDURE — 83880 ASSAY OF NATRIURETIC PEPTIDE: CPT | Performed by: EMERGENCY MEDICINE

## 2018-09-03 PROCEDURE — 93005 ELECTROCARDIOGRAM TRACING: CPT

## 2018-09-03 PROCEDURE — 00000146 ZZHCL STATISTIC GLUCOSE BY METER IP

## 2018-09-03 PROCEDURE — 85610 PROTHROMBIN TIME: CPT | Performed by: EMERGENCY MEDICINE

## 2018-09-03 PROCEDURE — 93010 ELECTROCARDIOGRAM REPORT: CPT | Mod: Z6 | Performed by: EMERGENCY MEDICINE

## 2018-09-03 PROCEDURE — 99220 ZZC INITIAL OBSERVATION CARE,LEVL III: CPT | Mod: Z6 | Performed by: FAMILY MEDICINE

## 2018-09-03 PROCEDURE — 84484 ASSAY OF TROPONIN QUANT: CPT | Performed by: NURSE PRACTITIONER

## 2018-09-03 PROCEDURE — 80053 COMPREHEN METABOLIC PANEL: CPT | Performed by: EMERGENCY MEDICINE

## 2018-09-03 PROCEDURE — 85379 FIBRIN DEGRADATION QUANT: CPT | Performed by: EMERGENCY MEDICINE

## 2018-09-03 PROCEDURE — 85730 THROMBOPLASTIN TIME PARTIAL: CPT | Performed by: EMERGENCY MEDICINE

## 2018-09-03 PROCEDURE — 99285 EMERGENCY DEPT VISIT HI MDM: CPT | Mod: 25 | Performed by: EMERGENCY MEDICINE

## 2018-09-03 PROCEDURE — 85025 COMPLETE CBC W/AUTO DIFF WBC: CPT | Performed by: EMERGENCY MEDICINE

## 2018-09-03 PROCEDURE — 93005 ELECTROCARDIOGRAM TRACING: CPT | Performed by: EMERGENCY MEDICINE

## 2018-09-03 PROCEDURE — 36415 COLL VENOUS BLD VENIPUNCTURE: CPT | Performed by: NURSE PRACTITIONER

## 2018-09-03 PROCEDURE — 93010 ELECTROCARDIOGRAM REPORT: CPT | Performed by: INTERNAL MEDICINE

## 2018-09-03 PROCEDURE — 71046 X-RAY EXAM CHEST 2 VIEWS: CPT

## 2018-09-03 RX ORDER — LIDOCAINE 40 MG/G
CREAM TOPICAL
Status: DISCONTINUED | OUTPATIENT
Start: 2018-09-03 | End: 2018-09-06 | Stop reason: HOSPADM

## 2018-09-03 RX ORDER — ASPIRIN 81 MG/1
162 TABLET, CHEWABLE ORAL ONCE
Status: COMPLETED | OUTPATIENT
Start: 2018-09-03 | End: 2018-09-03

## 2018-09-03 RX ORDER — NALOXONE HYDROCHLORIDE 0.4 MG/ML
.1-.4 INJECTION, SOLUTION INTRAMUSCULAR; INTRAVENOUS; SUBCUTANEOUS
Status: DISCONTINUED | OUTPATIENT
Start: 2018-09-03 | End: 2018-09-06 | Stop reason: HOSPADM

## 2018-09-03 RX ORDER — ASPIRIN 81 MG/1
81 TABLET ORAL DAILY
Status: DISCONTINUED | OUTPATIENT
Start: 2018-09-04 | End: 2018-09-06 | Stop reason: HOSPADM

## 2018-09-03 RX ORDER — ACETAMINOPHEN 325 MG/1
650 TABLET ORAL EVERY 4 HOURS PRN
Status: DISCONTINUED | OUTPATIENT
Start: 2018-09-03 | End: 2018-09-06 | Stop reason: HOSPADM

## 2018-09-03 RX ORDER — ALUMINA, MAGNESIA, AND SIMETHICONE 2400; 2400; 240 MG/30ML; MG/30ML; MG/30ML
30 SUSPENSION ORAL EVERY 4 HOURS PRN
Status: DISCONTINUED | OUTPATIENT
Start: 2018-09-03 | End: 2018-09-06 | Stop reason: HOSPADM

## 2018-09-03 RX ORDER — NITROGLYCERIN 0.4 MG/1
0.4 TABLET SUBLINGUAL EVERY 5 MIN PRN
Status: DISCONTINUED | OUTPATIENT
Start: 2018-09-03 | End: 2018-09-06 | Stop reason: HOSPADM

## 2018-09-03 RX ORDER — LISINOPRIL 5 MG/1
5 TABLET ORAL DAILY
Status: DISCONTINUED | OUTPATIENT
Start: 2018-09-03 | End: 2018-09-06 | Stop reason: HOSPADM

## 2018-09-03 RX ADMIN — ACETAMINOPHEN 650 MG: 325 TABLET, FILM COATED ORAL at 20:02

## 2018-09-03 RX ADMIN — LISINOPRIL 5 MG: 2.5 TABLET ORAL at 21:16

## 2018-09-03 RX ADMIN — ASPIRIN 81 MG CHEWABLE TABLET 162 MG: 81 TABLET CHEWABLE at 20:02

## 2018-09-03 ASSESSMENT — ENCOUNTER SYMPTOMS
SHORTNESS OF BREATH: 1
CHEST TIGHTNESS: 1
COUGH: 0
FEVER: 0
SPEECH DIFFICULTY: 0
HEMATURIA: 0
FREQUENCY: 1
HEADACHES: 0
BACK PAIN: 1
DYSURIA: 0

## 2018-09-03 NOTE — ED TRIAGE NOTES
PT presents to ED for a complaint of shortness of breath, with some numbness in his left arm. PT has no family history of cardiac history. PT did fly to [place] last week but no complaints of leg pain or swelling.

## 2018-09-03 NOTE — IP AVS SNAPSHOT
[address] [address]    Phone:  [phone]                                       After Visit Summary   9/3/2018    Lui Arrington    MRN: [id number]           After Visit Summary Signature Page     I have received my discharge instructions, and my questions have been answered. I have discussed any challenges I see with this plan with the nurse or doctor.    ..........................................................................................................................................  Patient/Patient Representative Signature      ..........................................................................................................................................  Patient Representative Print Name and Relationship to Patient    ..................................................               ................................................  Date                                            Time    ..........................................................................................................................................  Reviewed by Signature/Title    ...................................................              ..............................................  Date                                                            Time          22EPIC Rev 08/18

## 2018-09-03 NOTE — IP AVS SNAPSHOT
MRN:[id number]                      After Visit Summary   9/3/2018    Lui Arrington    MRN: [id number]           Thank you!     Thank you for choosing [facility] for your care. Our goal is always to provide you with excellent care. Hearing back from our patients is one way we can continue to improve our services. Please take a few minutes to complete the written survey that you may receive in the mail after you visit with us. Thank you!        Patient Information     Date Of Birth          1970        Designated Caregiver       Most Recent Value    Caregiver    Will someone help with your care after discharge? yes    Name of designated caregiver Zulema     Phone number of caregiver same as pt     Caregiver address same as pt       About your hospital stay     You were admitted on:  September 3, 2018 You last received care in the:  [address] [facility]    You were discharged on:  September 6, 2018        Reason for your hospital stay       Hand numbness and chest pain                  Who to Call     For medical emergencies, please call 911.  For non-urgent questions about your medical care, please call your primary care provider or clinic, [phone]          Attending Provider     Provider Specialty    Yamilka Oseguera MD Emergency Medicine    [facility]    Guillermina Issa MD Neurology       Primary Care Provider Office Phone # Fax #    Richy Dorsey [phone]      After Care Instructions     Activity       Your activity upon discharge: activity as tolerated            Diet       Follow this diet upon discharge: Orders Placed This Encounter      Regular Diet Adult            Discharge Instructions       Please come to the ER if you develop chest pain or shortness of breath.     1) Follow up with Dr. Issa in 2 weeks in Neurology clinic  2) Follow up with PCP and Cardiology as scheduled  3) Continue medications as  prescribed                  Follow-up Appointments     Adult [facility] Follow-up and recommended labs and tests       Follow up with primary care provider, Richy Dorsey, as schedule tomorrow, to evaluate medication change. No follow up labs or test are needed.     Follow up with Cardiology as scheduled in 2 weeks.    Follow up with Dr. Issa in 2 weeks in Neurology clinic    Appointments on [facility] and/or [facility] (with [facility] or [facility] provider or service). Call [phone] if you haven't heard regarding these appointments within 7 days of discharge.                  Your next 10 appointments already scheduled     Sep 07, 2018  9:20 AM CDT   Return Visit with Richy Dorsey MD   [facility] ([facility])    [address],  [address]  [place] [address]   [phone]              Additional Services     Cardiology Eval Adult Referral       Preferred location:  With Dr. Rosario in 1 month    Please be aware that coverage of these services is subject to the terms and limitations of your health insurance plan.  Call member services at your health plan with any benefit or coverage questions.      Please bring the following to your appointment:  Any x-rays, CTs or MRIs which have been performed. Contact the facility where they were done to arrange for  prior to your scheduled appointment.    List of current medications  This referral request   Any documents/labs given to you for this referral            Neurology Adult Referral       Follow up in 2 weeks with Dr. Issa                  Pending Results     Date and Time Order Name Status Description    9/6/2018 0952 CSF Culture Aerobic Bacterial In process     9/5/2018 1823 Apolipoprotein A1 In process     9/5/2018 1808 XR Lumbar Puncture Spinal Tap Diag Preliminary             Statement of Approval     Ordered          09/06/18 1404  I have reviewed and agree with all the recommendations and orders detailed in this  document.  EFFECTIVE NOW     Approved and electronically signed by:  Jonathan Thacker MD             Admission Information     Date & Time Provider Department Dept. Phone    9/3/2018 Guillermina Issa MD [address] [facility] [phone]      Your Vitals Were     Blood Pressure Pulse Temperature Respirations Pulse Oximetry       131/73 (BP Location: Right arm) 42 97.5  F (36.4  C) (Oral) 10 97%       MyChart Information     [employer] gives you secure access to your electronic health record. If you see a primary care provider, you can also send messages to your care team and make appointments. If you have questions, please call your primary care clinic.  If you do not have a primary care provider, please call [phone] and they will assist you.        Care EveryWhere ID     This is your Care EveryWhere ID. This could be used by other organizations to access your [facility] medical records  [id number]        Equal Access to Services     AHSAN STRANGE : Hadii shannan Olson, waaxda cj, qaybta kaalmada diane, beto colbert. So [facility] [phone].    ATENCIÓN: Si habla español, tiene a cortez disposición servicios gratuitos de asistencia lingüística. Llame al [phone].    We comply with applicable federal civil rights laws and [place] laws. We do not discriminate on the basis of race, color, national origin, age, disability, sex, sexual orientation, or gender identity.               Review of your medicines      START taking        Dose / Directions    amLODIPine 2.5 MG tablet   Commonly known as:  NORVASC   Used for:  Essential hypertension        Dose:  2.5 mg   Start taking on:  9/7/2018   Take 1 tablet (2.5 mg) by mouth daily   Quantity:  90 tablet   Refills:  1       aspirin 81 MG EC tablet   Used for:  Chest pain, unspecified type        Dose:  81 mg   Start taking on:  9/7/2018   Take 1 tablet (81 mg) by mouth daily   Quantity:  90 tablet    Refills:  3       atorvastatin 40 MG tablet   Commonly known as:  LIPITOR   Used for:  Chest pain, unspecified type        Dose:  40 mg   Take 1 tablet (40 mg) by mouth every evening   Quantity:  90 tablet   Refills:  3       nitroGLYcerin 0.4 MG sublingual tablet   Commonly known as:  NITROSTAT   Used for:  Chest pain, unspecified type        For chest pain place 1 tablet under the tongue every 5 minutes for 3 doses. If symptoms persist 5 minutes after 1st dose call 911.   Quantity:  90 tablet   Refills:  1         CONTINUE these medicines which have NOT CHANGED        Dose / Directions    lisinopril 5 MG tablet   Commonly known as:  PRINIVIL/ZESTRIL   Used for:  Benign essential hypertension        Dose:  5 mg   Take 1 tablet (5 mg) by mouth daily   Quantity:  30 tablet   Refills:  1            Where to get your medicines      These medications were sent to [facility]/pharmacy #[id number] - [place], [place] - [address]  [address], [place] [address]     Phone:  [phone]     amLODIPine 2.5 MG tablet    aspirin 81 MG EC tablet    atorvastatin 40 MG tablet    nitroGLYcerin 0.4 MG sublingual tablet                Protect others around you: Learn how to safely use, store and throw away your medicines at www.disposemymeds.org.             Medication List: This is a list of all your medications and when to take them. Check marks below indicate your daily home schedule. Keep this list as a reference.      Medications           Morning Afternoon Evening Bedtime As Needed    amLODIPine 2.5 MG tablet   Commonly known as:  NORVASC   Take 1 tablet (2.5 mg) by mouth daily   Start taking on:  9/7/2018   Last time this was given:  2.5 mg on 9/6/2018  8:10 AM                                aspirin 81 MG EC tablet   Take 1 tablet (81 mg) by mouth daily   Start taking on:  9/7/2018   Last time this was given:  81 mg on 9/6/2018  8:11 AM                                atorvastatin 40 MG tablet   Commonly known as:  LIPITOR    Take 1 tablet (40 mg) by mouth every evening   Last time this was given:  40 mg on 9/5/2018  8:14 PM                                lisinopril 5 MG tablet   Commonly known as:  PRINIVIL/ZESTRIL   Take 1 tablet (5 mg) by mouth daily   Last time this was given:  5 mg on 9/5/2018  9:43 PM                                nitroGLYcerin 0.4 MG sublingual tablet   Commonly known as:  NITROSTAT   For chest pain place 1 tablet under the tongue every 5 minutes for 3 doses. If symptoms persist 5 minutes after 1st dose call 911.   Last time this was given:  0.4 mg on 9/5/2018 12:59 PM

## 2018-09-03 NOTE — ED PROVIDER NOTES
History     Chief Complaint   Patient presents with     Shortness of Breath     Back Pain     HPI  Lui Arrington is a right-handed 48-year-old male with a history of hypertension who presents to the Emergency Department due to shortness of breath. Patient reports that he had sudden onset of shortness of breath approximately 90 minutes prior to arrival. With this, he also developed right-sided back pain that worsens with a deep breath. He denies falls or trauma. Additionally, he notes that he developed glove distribution paraesthesias in his left hand yesterday that eventually went away by moving his hand. Today, he had the same sensation but states that it has not gone away. He denies falls or trauma to the hand but does reports that he does sometime sleep on his hands, although, he has not had this in the past. Patient denies chest pain but does note some chest tightness with the shortness of breath. He has not had any visual or speech changes; no headache, fevers or cough. Patient denies dysuria or hematuria but does note increased urinary frequency.      He reports recent flights and travel to [place]. He denies pain or discomfort in his legs. Patient recently started taking 5 mg lisinopril daily on 8/24. He does not have a history of heart or lung disease; no kidney or liver disease. Patient also denies history of diabetes.      I have reviewed the Medications, Allergies, Past Medical and Surgical History, and Social History in the Epic system.    No current facility-administered medications for this encounter.      Current Outpatient Prescriptions   Medication     lisinopril (PRINIVIL/ZESTRIL) 5 MG tablet     Past Medical History:   Diagnosis Date     Diverticulosis      Seasonal allergies      Substance dependence, in remission (H) 1993       Past Surgical History:   Procedure Laterality Date     COLONOSCOPY  8/4/2014    Procedure: COMBINED COLONOSCOPY, SINGLE BIOPSY/POLYPECTOMY BY BIOPSY;  Surgeon:  Pb Celaya MD;  Location:  GI     VASECTOMY         Family History   Problem Relation Age of Onset     Cancer Mother      Cancer - colorectal Mother      Hypertension Mother      Crohn Disease Mother      Cancer Father      Diabetes Maternal Grandmother      KARIN. Maternal Grandmother      KARIN. Paternal Grandfather      Hypertension Brother        Social History   Substance Use Topics     Smoking status: Former Smoker     Quit date: 1/1/1998     Smokeless tobacco: Never Used     Alcohol use No     Allergies   Allergen Reactions     Ciprofloxacin Hives     Erythromycin      Review of Systems   Constitutional: Negative for fever.   Eyes:        Negative for visual changes   Respiratory: Positive for chest tightness and shortness of breath. Negative for cough.    Cardiovascular: Negative for chest pain.   Genitourinary: Positive for frequency. Negative for dysuria and hematuria.   Musculoskeletal: Positive for back pain (right-sided).   Neurological: Negative for speech difficulty and headaches.        Positive for glove distributed left hand paraesthesias   All other systems reviewed and are negative.      Physical Exam   BP: (!) 184/93  Heart Rate: 53  Temp: 97.8  F (36.6  C)  Resp: 16  SpO2: 99 %      Physical Exam    GEN:  Well developed, no acute distress  HEENT:  PERRL, EOMI, Mucous membranes are moist.   Cardio:  RRR, no murmur, radial pulses equal bilaterally  PULM:  Lungs clear, good air movement, no wheezes, rales  Abd:  Soft, normal bowel sounds, no focal tenderness  Musculoskeletal:  normal range of motion of all 4 extremities, no lower extremity swelling or calf tenderness  Neuro:  Alert and oriented X3, Follows commands, CN exam is normal, finger to nose is normal, sensation and strength is normal throughout, patellar reflexes are normal, no pronator drift   Skin:  Warm, dry    ED Course   1:40 PM  The patient was seen and examined by Yamilka Oseguera MD in Room 11.   ED Course      Procedures             EKG Interpretation:      Interpreted by Yamilka Carney reviewed: 13:35  Symptoms at time of EKG: shortness of breath   Rhythm:  sinus bradycardia  Rate: 50  Axis: normal  Ectopy: none  Conduction: normal  ST Segments/ T Waves: No ST-T wave changes, non-specific ST abnormailty  Q Waves: none  Comparison to prior: No old EKG available    Clinical Impression: Sinus bradycardia         EKG Interpretation:      Interpreted by Yamilka Carney reviewed:16:15   Symptoms at time of EKG: chest discomfort   Rhythm: sinus bradycardia  Rate: 44  Axis: NORMAL  Ectopy: none  Conduction: normal  ST Segments/ T Waves: No ST-T wave changes  Q Waves: none  Comparison to prior: There is no longer a nonspecific ST segment abnormality compared to earlier today.    Clinical Impression: Sinus bradycardia, otherwise normal EKG      Critical Care time:  none  Labs are normal except as shown.  Chest x-ray was reviewed by me and results are shown here:  Results for orders placed or performed during the hospital encounter of 09/03/18   XR Chest 2 Views    Narrative    Exam:  Chest X-ray 9/3/2018 3:15 PM    History: shortness of breath, back pain;     Comparison: Shortness of breath, back pain    Findings: PA and lateral views of the chest. The trachea is midline.  The cardiomediastinal silhouette is within normal limits. Pulmonary  vasculature is distinct. No pleural effusion or pneumothorax. No focal  pulmonary opacities. No acute bony abnormalities. The upper abdomen is  unremarkable.      Impression    Impression:   No acute cardiopulmonary abnormalities.    I have personally reviewed the examination and initial interpretation  and I agree with the findings.    SHARMILA SHEPARD MD     Neurology consult was obtained for the left hand paresthesias.  Neurology does not suspect acute stroke or other acute intracranial pathology.  Neurology agrees with admission to the observation unit for  cardiac workup.  They suggested that the patient could have an MRI of the brain during his observation unit admission or as an outpatient.  He should then follow-up with neurology as an outpatient.    Labs Ordered and Resulted from Time of ED Arrival Up to the Time of Departure from the ED   CBC WITH PLATELETS DIFFERENTIAL - Abnormal; Notable for the following:        Result Value    Platelet Count 149 (*)     All other components within normal limits   D DIMER QUANTITATIVE   INR   PARTIAL THROMBOPLASTIN TIME   COMPREHENSIVE METABOLIC PANEL   TROPONIN I   NT PROBNP INPATIENT   TROPONIN I   ISTAT TROPONIN NURSING POCT   TROPONIN POCT       Consults  Neurology: Responded (09/03/18 8569)    Assessments & Plan (with Medical Decision Making)   Patient presents with shortness of breath and left-sided back pain starting at the same time prior to emergency department admission.  He has also had some left hand paresthesias since yesterday.  There is no sign of neurovascular compromise of the left hand or arm.  No sign of acute infection.  Patient's workup for the shortness of breath in the ED is negative.  No sign of acute CHF, pneumonia, pneumothorax, PE.  Initial evaluation for acute coronary syndrome is negative.  Patient will be admitted to the observation unit for further evaluation for unstable angina.    I have reviewed the nursing notes.    I have reviewed the findings, diagnosis, plan and need for follow up with the patient.    New Prescriptions    No medications on file       Final diagnoses:   SOB (shortness of breath)   Paresthesias in left hand   I, Isaura Arrington, am serving as a trained medical scribe to document services personally performed by Yamilka Oseguera MD, based on the provider's statements to me.   IYamilka MD, was physically present and have reviewed and verified the accuracy of this note documented by Isaura Arrington.    9/3/2018   [facility], EMERGENCY DEPARTMENT     [place]  Yamilka Magallanes MD  09/03/18 5001

## 2018-09-03 NOTE — ED NOTES
[facility], [place]   ED Nurse to Floor Handoff     Lui Arrington is a 48 year old male who speaks English and lives with a spouse,  in a home  They arrived in the ED by car from home    ED Chief Complaint: Shortness of Breath and Back Pain    ED Dx;   Final diagnoses:   SOB (shortness of breath)   Paresthesias in left hand         Needed?: No    Allergies:   Allergies   Allergen Reactions     Ciprofloxacin Hives     Erythromycin    .  Past Medical Hx:   Past Medical History:   Diagnosis Date     Diverticulosis      Seasonal allergies      Substance dependence, in remission (H) 1993      Baseline Mental status: WDL  Current Mental Status changes: at basesline    Infection present or suspected this encounter: no  Sepsis suspected: No  Isolation type: No active isolations     Activity level - Baseline/Home:  Independent  Activity Level - Current:   Independent    Bariatric equipment needed?: No    In the ED these meds were given: Medications - No data to display    Drips running?  No    Home pump  No    Current LDAs  Peripheral IV 09/03/18 Right Hand (Active)   Number of days:0       Labs results:   Labs Ordered and Resulted from Time of ED Arrival Up to the Time of Departure from the ED   CBC WITH PLATELETS DIFFERENTIAL - Abnormal; Notable for the following:        Result Value    Platelet Count 149 (*)     All other components within normal limits   D DIMER QUANTITATIVE   INR   PARTIAL THROMBOPLASTIN TIME   COMPREHENSIVE METABOLIC PANEL   TROPONIN I   NT PROBNP INPATIENT   TROPONIN I   ISTAT TROPONIN NURSING POCT   TROPONIN POCT       Imaging Studies:   Recent Results (from the past 24 hour(s))   XR Chest 2 Views    Narrative    Exam:  Chest X-ray 9/3/2018 3:15 PM    History: shortness of breath, back pain;     Comparison: Shortness of breath, back pain    Findings: PA and lateral views of the chest. The trachea is midline.  The cardiomediastinal silhouette is within  normal limits. Pulmonary  vasculature is distinct. No pleural effusion or pneumothorax. No focal  pulmonary opacities. No acute bony abnormalities. The upper abdomen is  unremarkable.      Impression    Impression:   No acute cardiopulmonary abnormalities.    I have personally reviewed the examination and initial interpretation  and I agree with the findings.    SHARMILA SHEPARD MD       Recent vital signs:   /86  Temp 97.8  F (36.6  C) (Oral)  Resp 25  SpO2 97%    Cardiac Rhythm: Normal Sinus and Bradycardia  Pt needs tele? Yes  Skin/wound Issues: None    Code Status: Full Code    Pain control: pt had none    Nausea control: pt had none    Abnormal labs/tests/findings requiring intervention: intermittent chest pain, SOB, numbness/tingling    Family present during ED course? Yes   Family Comments/Social Situation comments: Wife @ bedside    Tasks needing completion: 2nd troponin  ascom-- [id number] [phone] [facility]  [phone] [facility] ED

## 2018-09-03 NOTE — CONSULTS
"[facility]  Neurology Consultation    Patient Name:  Lui Arrington  MRN:  [id number]    :  1970  Date of Service:  September 3, 2018  Primary care provider:  Richy Dorsey      Neurology consultation service was asked to see Lui Arrington by Dr. Oseguera to evaluate left hand parasthesias.    History of Present Illness:   The patient is a 48-year-old gentleman with a past medical history of hypertension who is presenting to the emergency room with chest pain, shortness of breath, and left hand paresthesias.  The patient stated he woke up yesterday morning with left hand \"tingling\".  He the sensation involved his entire hand like a \"glove\".  The sensation was present for about 30 minutes and then resolved when he used a wrist exerciser.  The patient stated he woke up again with the same sensation today except this time it did not go away when using the wrist exerciser.  He stated at 12 PM today he began to have shortness of breath and chest pain which she stated extends into his back.  Throughout the day the tingling sensation would fluctuate as it would extend to his mid forearm and then back to his wrist.  He stated this has never happened before.  While in the emergency room the patient stated he is now developing the same symptoms in his right hand which have been present now for about an hour.  The patient currently works as an Ethicist and often types at work.  No prior diagnosis of carpal tunnel syndrome.  The patient stated he is concerned about having an MI as his friend was recently diagnosed with this.    ROS  A 10-point ROS was performed as per HPI.     PMH  Past Medical History:   Diagnosis Date     Diverticulosis      Seasonal allergies      Substance dependence, in remission (H)      Past Surgical History:   Procedure Laterality Date     COLONOSCOPY  2014    Procedure: COMBINED COLONOSCOPY, SINGLE BIOPSY/POLYPECTOMY BY BIOPSY;  Surgeon: Juan" Pb Titus MD;  Location:  GI     VASECTOMY         Medications     (Not in a hospital admission)    Allergies  Allergies   Allergen Reactions     Ciprofloxacin Hives     Erythromycin        Social History  Social History     Social History     Marital status:      Spouse name: N/A     Number of children: N/A     Years of education: N/A     Occupational History     Not on file.     Social History Main Topics     Smoking status: Former Smoker     Quit date: 1/1/1998     Smokeless tobacco: Never Used     Alcohol use No     Drug use: Yes      Comment: in remission 1993, marijuana, ETOH, narcotic (no IV use)     Sexual activity: Yes     Partners: Female     Other Topics Concern     Not on file     Social History Narrative    Self-employed, ethics and leadership education. , has 4 yo son plus two children from previous marriage. Non-smoker. No alcohol use (abstains due to remote hx of alcoholism), no other drug use.      Family History    Family History   Problem Relation Age of Onset     Cancer Mother      Cancer - colorectal Mother      Hypertension Mother      Crohn Disease Mother      Cancer Father      Diabetes Maternal Grandmother      C.A.D. Maternal Grandmother      C.A.D. Paternal Grandfather      Hypertension Brother      Physical Examination   Vitals: BP (!) 139/96  Temp 97.8  F (36.6  C) (Oral)  Resp 16  SpO2 95%  General: Adult, appears stated age, in NAD, cooperative, no gross deformity   HEENT: NC/AT, no icterus, op pink and moist  Cardiac: RRR  Chest: No respiratory distress  Abdomen: Nondistended  Extremities: No LE swelling.  Skin: No rash or lesion.   Psych: Mood pleasant  Neuro:  Mental status: Awake, alert, attentive, and oriented to person place and time. Fund of knowledge intact. Normal spontaneous speech. Comprehension and repetition intact.  Cranial nerves: Eyes conjugate, PERRL, EOMI, visual fields full, facial sensation intact, symmetric facial movements, shoulder shrug  strong, tongue/uvula midline. No evidence of dysarthria.  Motor: Normal proximal and distal strength in all 4 limbs. 5/5 strength in all 4 extremities. Normal muscle bulk and tone. No tremor, myoclonus or other abnormal movements. No pronator drift.  Tinel and Phalen sign negative.  Reflexes: 2+ and symmetric biceps, brachioradialis, patellar, and achilles.  No evidence of Theresa sign.  Toes down-going.  Sensory: Intact to light touch, pinprick, vibratory, and proprioception throughout bilateral upper extremities and lower extremities.  Romberg negative.  Coordination: FNF and HTS with no dysmetria  Gait: Normal width, stride length, turn, and arm swing. Station normal. Tandem walk intact.    Investigations   Troponin: Within normal limits    Impression  The patient is a 48-year-old gentleman with a past medical history of hypertension who is presenting to the emergency room with chest pain, shortness of breath, and left hand paresthesias.  Neurology was consulted to further evaluate left hand paresthesias.  The patient's neurological examination was normal and no sensory or motor deficits were noted in the bilateral upper extremities.  Given that the patient is also presenting with chest pain and shortness of breath, it is concerning he may have an underlying cardiopulmonary process that is attributing to the left and now right hand paresthesias.  As the paresthesias affect multiple different peripheral nerves unlikely carpal tunnel syndrome or a brachial plexopathy given the normal examination.  It is certainly possible there may be an underlying central process that would localize to the brain such as stroke or mass lesion.  If the cardiopulmonary workup is negative MRI brain prior to discharge would be recommended.    Recommendations  -Continue cardiopulmonary workup for chest pain and shortness of breath  -If cardiopulmonary workup negative, we would recommend MRI brain with and without contrast prior to  discharge  -Follow-up in general neurology clinic in 2 weeks or soonest available    Thank you for involving neurology in the care of Lui Arrington.  Please do not hesitate to call with questions/concerns (consult pager [phone]).      Patient was discussed with Dr. Issa.    Jonathan Thacker  Neurology PGY3  [id number]

## 2018-09-03 NOTE — H&P
"ED OBSERVATION HISTORY & PHYSICAL    Admission Date: 09/03/2018      REASON FOR ADMISSION:   Chief Complaint   Patient presents with     Shortness of Breath     Back Pain         HPI:    Lui Arrington is a right-handed 48-year-old male with a history of hypertension who presented to the ED today with SOB, left arm paresthesia, and chest pain.     He notes that yesterday he developed a glove like tingling in his left hand. He did some exercises and this resolved. He again noted symptoms today, but states that it has not gone away. He notes sensation of weakness, but strength intact with exercises. Today he was reading with his son when he notes sudden onset of shortness of breath approximately 90 minutes prior to arrival. He was unable to finish sentences due to SOB. He also developed right-sided back pain that worsens with a deep breath and chest pain/pressure that \"moves.\". He denies falls or trauma to the chest, back, or hand. He does reports that he does sometime sleep on his hands, although, he has not had this in the past.  He has not had any visual or speech changes; no headache, fevers or cough. No nuchal rigidity. Patient denies dysuria or hematuria but does note increased urinary frequency.       He reports recent flights and travel to [place]. He denies pain or discomfort in his legs. He does have a grandmother with history of PE at the end of her life. O/w no personal or family history of bleeding or clotting disorders. Patient recently started taking 5 mg lisinopril daily on 8/24. He does not have a history of heart or lung disease; no kidney or liver disease. Patient also denies history of diabetes.      In the ED his chest x-ray was clear, labs were WNL, troponin was normal. EKG showed SB. Neurology evaluated him and recommended out-patient MRI, final recommendations pending at this time. He is admitted to observation for ACS rule out.     On admission to the observation unit the patient was " "stable. He notes ongoing left hand tingling and left sided chest pressure, under left rib cage. Denies SOB. Denies fever, chills, or recent illness. HR 40's-60's, which he says is chronic for him. Although, lower 40's is slightly lower than normal. He states he is unsure if he should have presented to the ED or if he should be admitted. He admits to feeling \"wierd.\" He does agree to undergo Cardiac rule out.  Denies palpitations, orthopnea, edema or open areas on extremities.     ROS:    CONSTITUTIONAL:Denies fever, chills  SKIN: Denies rash  EYES: Denies visual changes  EARS/NOSE/THROAT: Denies sinus pressure/drainage, PND  RESPIRATORY: Denies dyspnea currently, cough, or hemoptysis.  GASTROINTESTINAL: Good appetite and PO intake. Denies dysphagia, heartburn, nausea, vomiting, abdominal pain, constipation, or diarrhea.  GENITOURINARY: Denies complaints  MUSCULOSKELTAL: See HPI o/w Denies muscle/joint pain and weakness  NEUROLOGIC: See HPI. O/w denies headaches, dizziness, numbness or tingling of hands and feet, confusion, memory changes, lightheadedness/dizziness or difficulties with balance.  VASCULAR ACCESS: Denies pain, redness, or discharge.    ROS negative other than the symptoms noted above.    History:    Past Medical History:   Diagnosis Date     Diverticulosis      Seasonal allergies      Substance dependence, in remission (H) 1993       Past Surgical History:   Procedure Laterality Date     COLONOSCOPY  8/4/2014    Procedure: COMBINED COLONOSCOPY, SINGLE BIOPSY/POLYPECTOMY BY BIOPSY;  Surgeon: Pb Celaya MD;  Location:  GI     VASECTOMY         Family History   Problem Relation Age of Onset     Cancer Mother      Cancer - colorectal Mother      Hypertension Mother      Crohn Disease Mother      Cancer Father      Diabetes Maternal Grandmother      C.A.D. Maternal Grandmother      C.A.D. Paternal Grandfather      Hypertension Brother        Social History     Social History     Marital status: " "     Spouse name: N/A     Number of children: N/A     Years of education: N/A     Occupational History     Not on file.     Social History Main Topics     Smoking status: Former Smoker     Quit date: 1/1/1998     Smokeless tobacco: Never Used     Alcohol use No     Drug use: Yes      Comment: in remission 1993, marijuana, ETOH, narcotic (no IV use)     Sexual activity: Yes     Partners: Female     Other Topics Concern     Not on file     Social History Narrative    Self-employed, ethics and leadership education. , has 6 yo son plus two children from previous marriage. Non-smoker. No alcohol use (abstains due to remote hx of alcoholism), no other drug use.          No current facility-administered medications on file prior to encounter.   Current Outpatient Prescriptions on File Prior to Encounter:  lisinopril (PRINIVIL/ZESTRIL) 5 MG tablet Take 1 tablet (5 mg) by mouth daily       Exam:  Vital signs:  Temp: 97.8  F (36.6  C) Temp src: Oral BP: 136/86   Heart Rate: 46 Resp: 16 SpO2: 99 % O2 Device: None (Room air)        Estimated body mass index is 35.15 kg/(m^2) as calculated from the following:    Height as of 8/10/15: 1.905 m (6' 3\").    Weight as of 8/24/18: 127.6 kg (281 lb 3.2 oz).    All vital signs were reviewed.  GENERAL APPEARENCE:  A/O x4. NAD.  SKIN: Clean, dry, and intact.  HEENT: NCAT w/out masses, lesions, or abnormalities. Sclera anicteric, PERRLA, EOMI.  Oral mucosa pink and moist without erythema, exudate, lesions, ulcerations, or thrush. Teeth and gums normal.    NECK: Supple, no masses. No jugular venous distention.   CARDIOVASCULAR: S1, S2 RRR. No murmurs, rubs, or gallops.   RESPIRATORY: Respiratory effort WNL. CTA  bilaterally without crackles/rales/wheeze   GI: Active BS in all 4 quadrants. Abdomen soft and non-tender. No masses or hepatosplenomegaly.  : Deferred  MUSCULOSKELETAL:   Extremities normal, no gross deformities noted, non-tender to palpation.   PV: 2+ bilateral " radial and pedal pulses. No edema noted.   NEURO: CN II-XII grossly intact. Speech normal. Appropriate throughout interview.   HEME/LYMPH: No visible bleeding.  PSYCHIATRIC: Mentation and affect appear normal  VASCULAR ACCESS: CDI without erythema or discharge. Non-tender.    Data:    Results for orders placed or performed during the hospital encounter of 09/03/18   XR Chest 2 Views    Narrative    Exam:  Chest X-ray 9/3/2018 3:15 PM    History: shortness of breath, back pain;     Comparison: Shortness of breath, back pain    Findings: PA and lateral views of the chest. The trachea is midline.  The cardiomediastinal silhouette is within normal limits. Pulmonary  vasculature is distinct. No pleural effusion or pneumothorax. No focal  pulmonary opacities. No acute bony abnormalities. The upper abdomen is  unremarkable.      Impression    Impression:   No acute cardiopulmonary abnormalities.    I have personally reviewed the examination and initial interpretation  and I agree with the findings.    SHARMILA SHEPARD MD   CBC with platelets differential   Result Value Ref Range    WBC 6.6 4.0 - 11.0 10e9/L    RBC Count 5.38 4.4 - 5.9 10e12/L    Hemoglobin 15.2 13.3 - 17.7 g/dL    Hematocrit 45.6 40.0 - 53.0 %    MCV 85 78 - 100 fl    MCH 28.3 26.5 - 33.0 pg    MCHC 33.3 31.5 - 36.5 g/dL    RDW 13.7 10.0 - 15.0 %    Platelet Count 149 (L) 150 - 450 10e9/L    Diff Method Automated Method     % Neutrophils 46.1 %    % Lymphocytes 40.8 %    % Monocytes 9.2 %    % Eosinophils 2.9 %    % Basophils 0.5 %    % Immature Granulocytes 0.5 %    Nucleated RBCs 0 0 /100    Absolute Neutrophil 3.1 1.6 - 8.3 10e9/L    Absolute Lymphocytes 2.7 0.8 - 5.3 10e9/L    Absolute Monocytes 0.6 0.0 - 1.3 10e9/L    Absolute Eosinophils 0.2 0.0 - 0.7 10e9/L    Absolute Basophils 0.0 0.0 - 0.2 10e9/L    Abs Immature Granulocytes 0.0 0 - 0.4 10e9/L    Absolute Nucleated RBC 0.0    D dimer quantitative   Result Value Ref Range    D Dimer 0.5 0.0 - 0.50  ug/ml FEU   INR   Result Value Ref Range    INR 0.92 0.86 - 1.14   Partial thromboplastin time   Result Value Ref Range    PTT 30 22 - 37 sec   Comprehensive metabolic panel   Result Value Ref Range    Sodium 139 133 - 144 mmol/L    Potassium 4.0 3.4 - 5.3 mmol/L    Chloride 106 94 - 109 mmol/L    Carbon Dioxide 27 20 - 32 mmol/L    Anion Gap 6 3 - 14 mmol/L    Glucose 93 70 - 99 mg/dL    Urea Nitrogen 15 7 - 30 mg/dL    Creatinine 0.86 0.66 - 1.25 mg/dL    GFR Estimate >90 >60 mL/min/1.7m2    GFR Estimate If Black >90 >60 mL/min/1.7m2    Calcium 8.8 8.5 - 10.1 mg/dL    Bilirubin Total 0.6 0.2 - 1.3 mg/dL    Albumin 4.0 3.4 - 5.0 g/dL    Protein Total 8.0 6.8 - 8.8 g/dL    Alkaline Phosphatase 64 40 - 150 U/L    ALT 29 0 - 70 U/L    AST 20 0 - 45 U/L   Troponin I   Result Value Ref Range    Troponin I ES <0.015 0.000 - 0.045 ug/L   Nt probnp inpatient   Result Value Ref Range    N-Terminal Pro BNP Inpatient 61 0 - 900 pg/mL   Troponin I   Result Value Ref Range    Troponin I ES <0.015 0.000 - 0.045 ug/L   EKG 12 lead   Result Value Ref Range    Interpretation ECG Click View Image link to view waveform and result    Troponin POCT   Result Value Ref Range    Troponin I 0.01 0.00 - 0.10 ug/L             EKG Interpretation:      Interpreted by Yamilka Oseguera  Time reviewed: 13:35  Symptoms at time of EKG: shortness of breath   Rhythm:  sinus bradycardia  Rate: 50  Axis: normal  Ectopy: none  Conduction: normal  ST Segments/ T Waves: No ST-T wave changes, non-specific ST abnormailty  Q Waves: none  Comparison to prior: No old EKG available     Clinical Impression: Sinus bradycardia    Assessment/Plan: Lui Arrington is a right-handed 48-year-old male with a history of hypertension who presented to the ED today with SOB, left arm paresthesia, and chest pain.     1. Chest Pain, SOB, Left arm Paresthesia: Paresthesia started yesterday, resolved and the return today. Sudden onset SOB and chest pressure today.  SOB resolved, chest pressure intermittent. Chest x-ray was clear, labs were WNL, troponin was normal. EKG shows SB. Neurology evaluated the patient and recommended out-patient MRI, final recommendations pending.   -[place] to observation  -Serial troponin x 2 more  -TELE  -EKG PRN pain  -VS per unit routine  - Follow-up on final Neurology recommendations  - repeat EKG with ongoing moving chest pressure    2. HTN: resume home lisinopril       FEN:  -Regular diet as tolerated, no caffeine and CLD at midnight   -Monitor BMP      Prophy:  -No VTE prophy as patient is up ad josie and anticipate short observation stay   -Encourage ambulation as tolerated     Consults: Neurology     CODE STATUS:  FULL CODE      DISPOSITION: Pending cardiac work-up, anticipate tomorrow. Of note, if possible, the patient would like to be discharged by noon tomorrow for a work event at 1500.      KALLIE Yuan, CNP  Nurse Practitioner   Emergency Department Observation Unit

## 2018-09-04 ENCOUNTER — APPOINTMENT (OUTPATIENT)
Dept: MRI IMAGING | Facility: CLINIC | Age: 48
DRG: 093 | End: 2018-09-04
Payer: COMMERCIAL

## 2018-09-04 ENCOUNTER — APPOINTMENT (OUTPATIENT)
Dept: MRI IMAGING | Facility: CLINIC | Age: 48
DRG: 093 | End: 2018-09-04
Attending: STUDENT IN AN ORGANIZED HEALTH CARE EDUCATION/TRAINING PROGRAM
Payer: COMMERCIAL

## 2018-09-04 ENCOUNTER — APPOINTMENT (OUTPATIENT)
Dept: CARDIOLOGY | Facility: CLINIC | Age: 48
DRG: 093 | End: 2018-09-04
Attending: NURSE PRACTITIONER
Payer: COMMERCIAL

## 2018-09-04 LAB
B BURGDOR IGG+IGM SER QL: 0.04 (ref 0–0.89)
INTERPRETATION ECG - MUSE: NORMAL
TROPONIN I SERPL-MCNC: <0.015 UG/L (ref 0–0.04)
TSH SERPL DL<=0.005 MIU/L-ACNC: 1.52 MU/L (ref 0.4–4)

## 2018-09-04 PROCEDURE — 93010 ELECTROCARDIOGRAM REPORT: CPT | Performed by: INTERNAL MEDICINE

## 2018-09-04 PROCEDURE — 25000132 ZZH RX MED GY IP 250 OP 250 PS 637: Performed by: NURSE PRACTITIONER

## 2018-09-04 PROCEDURE — 86618 LYME DISEASE ANTIBODY: CPT | Performed by: STUDENT IN AN ORGANIZED HEALTH CARE EDUCATION/TRAINING PROGRAM

## 2018-09-04 PROCEDURE — 25500064 ZZH RX 255 OP 636: Performed by: FAMILY MEDICINE

## 2018-09-04 PROCEDURE — 93321 DOPPLER ECHO F-UP/LMTD STD: CPT | Mod: 26 | Performed by: INTERNAL MEDICINE

## 2018-09-04 PROCEDURE — 40000275 ZZH STATISTIC RCP TIME EA 10 MIN

## 2018-09-04 PROCEDURE — 70553 MRI BRAIN STEM W/O & W/DYE: CPT

## 2018-09-04 PROCEDURE — 25000128 H RX IP 250 OP 636: Performed by: FAMILY MEDICINE

## 2018-09-04 PROCEDURE — 93005 ELECTROCARDIOGRAM TRACING: CPT

## 2018-09-04 PROCEDURE — 93325 DOPPLER ECHO COLOR FLOW MAPG: CPT | Mod: 26 | Performed by: INTERNAL MEDICINE

## 2018-09-04 PROCEDURE — 72156 MRI NECK SPINE W/O & W/DYE: CPT

## 2018-09-04 PROCEDURE — A9585 GADOBUTROL INJECTION: HCPCS | Performed by: FAMILY MEDICINE

## 2018-09-04 PROCEDURE — 25000132 ZZH RX MED GY IP 250 OP 250 PS 637: Performed by: PHYSICIAN ASSISTANT

## 2018-09-04 PROCEDURE — 93016 CV STRESS TEST SUPVJ ONLY: CPT | Performed by: INTERNAL MEDICINE

## 2018-09-04 PROCEDURE — G0378 HOSPITAL OBSERVATION PER HR: HCPCS

## 2018-09-04 PROCEDURE — 99224 ZZC SUBSEQUENT OBSERVATION CARE,LEVEL I: CPT | Mod: Z6 | Performed by: PHYSICIAN ASSISTANT

## 2018-09-04 PROCEDURE — 93018 CV STRESS TEST I&R ONLY: CPT | Performed by: INTERNAL MEDICINE

## 2018-09-04 PROCEDURE — 93350 STRESS TTE ONLY: CPT | Mod: 26 | Performed by: INTERNAL MEDICINE

## 2018-09-04 PROCEDURE — 36415 COLL VENOUS BLD VENIPUNCTURE: CPT | Performed by: STUDENT IN AN ORGANIZED HEALTH CARE EDUCATION/TRAINING PROGRAM

## 2018-09-04 RX ORDER — LANOLIN ALCOHOL/MO/W.PET/CERES
3 CREAM (GRAM) TOPICAL ONCE
Status: COMPLETED | OUTPATIENT
Start: 2018-09-04 | End: 2018-09-04

## 2018-09-04 RX ORDER — GADOBUTROL 604.72 MG/ML
10 INJECTION INTRAVENOUS ONCE
Status: COMPLETED | OUTPATIENT
Start: 2018-09-04 | End: 2018-09-04

## 2018-09-04 RX ADMIN — GADOBUTROL 10 ML: 604.72 INJECTION INTRAVENOUS at 20:59

## 2018-09-04 RX ADMIN — ACETAMINOPHEN 650 MG: 325 TABLET, FILM COATED ORAL at 08:21

## 2018-09-04 RX ADMIN — Medication 3 MG: at 03:09

## 2018-09-04 RX ADMIN — PERFLUTREN 5 ML: 6.52 INJECTION, SUSPENSION INTRAVENOUS at 09:15

## 2018-09-04 RX ADMIN — ASPIRIN 81 MG: 81 TABLET, COATED ORAL at 08:22

## 2018-09-04 RX ADMIN — ACETAMINOPHEN 650 MG: 325 TABLET, FILM COATED ORAL at 13:17

## 2018-09-04 RX ADMIN — ACETAMINOPHEN 650 MG: 325 TABLET, FILM COATED ORAL at 21:36

## 2018-09-04 RX ADMIN — ACETAMINOPHEN 650 MG: 325 TABLET, FILM COATED ORAL at 01:54

## 2018-09-04 RX ADMIN — LISINOPRIL 5 MG: 2.5 TABLET ORAL at 21:36

## 2018-09-04 ASSESSMENT — PAIN DESCRIPTION - DESCRIPTORS
DESCRIPTORS: HEADACHE
DESCRIPTORS: HEADACHE

## 2018-09-04 NOTE — PROGRESS NOTES
ED OBSERVATION PROGRESS NOTE:  S: Lui Arrington is a right-handed 48-year-old male with a history of hypertension who presented to the ED today with SOB, left arm paresthesia, and chest pain.   Chief Complaint   Patient presents with     Shortness of Breath     Back Pain     1. SOB (shortness of breath)    2. Paresthesias in left hand        Problem List:  Patient Active Problem List   Diagnosis     Diverticulitis of colon with perforation     Diverticulitis of colon       MEDS:   No current outpatient prescriptions on file.       ALLERGIES:    Allergies   Allergen Reactions     Ciprofloxacin Hives     Erythromycin        O:/85 (BP Location: Right arm)  Pulse (!) 42  Temp 97.7  F (36.5  C) (Oral)  Resp 16  SpO2 99%    Physical Exam   Constitutional: Pt is oriented to person, place, and time.Pt appears well-developed and well-nourished.   Head: Normocephalic and atraumatic.   Eyes: Conjunctivae are normal. Pupils are equal, round, and reactive to light.   Neck: Normal range of motion. Neck supple.   Cardiovascular: Normal rate, regular rhythm, normal heart sounds and intact distal pulses.    Pulmonary/Chest: Effort normal and breath sounds normal. No respiratory distress. Pt has no wheezes. Pt has no rales  Abdominal: Soft. Bowel sounds are normal. Pt exhibits no distension and no mass. No tenderness. Pt has no rebound and no guarding.   Musculoskeletal: Upper extremity numbness and tingling.   Neurological: Pt is alert and oriented to person, place, and time. Normal reflexes.   Skin: Skin is warm and dry. No rash noted.   Psychiatric: Pt has a normal mood and affect. Behavior is normal. Judgment and thought content normal.     Assessment/Plan: Lui Arrington is a right-handed 48-year-old male with a history of hypertension who presented to the ED today with SOB, left arm paresthesia, and chest pain.      1. Chest Pain, SOB, Left arm Paresthesia: Paresthesia started yesterday, resolved and the  return today. Sudden onset SOB and chest pressure today. SOB resolved, chest pressure intermittent. Chest x-ray was clear, labs were WNL, troponin was normal. EKG shows SB. Pt underwent stress echo this morning and this showed 1 mm ST segment depression in the inferior and lateral leads that normalize to baseline 2 minutes into recovery. Cardiology fellow herbert rincon, will review case with staff to see if needs official consult.  Neurology evaluated the patient and recommended brain MR for stroke as well as MR cervical spine. They will also consider LP, final recommendations pending. If LP recommended, pt will likely to go to neurology service   -TELE  -EKG PRN pain  -VS per unit routine  - Follow-up on final Neurology recommendations  - Follow brain and cervical MR  -Cardiology consult in am after case reviewed by card staff      2. HTN: resume home lisinopril         FEN:  -Regular diet as tolerated, no caffeine and CLD at midnight   -Monitor BMP       Prophy:  -No VTE prophy as patient is up ad josie and anticipate short observation stay   -Encourage ambulation as tolerated      Consults: Neurology      CODE STATUS:  FULL CODE       DISPOSITION: Pending cardiac work-up, anticipate tomorrow. Of note, if possible, the patient would like to be discharged by noon tomorrow for a work event at 1500.         Consults: Neurology  FEN: NPO at midmight  DVT prophylaxis: Early ambulation  Code Status: Full  Disposition: Stable vital signs. Patient return to baseline.  All labs/images reviewed    Signed:  Dulce Maria Lopez PA-C  September 4, 2018 at 6:44 PM

## 2018-09-04 NOTE — PLAN OF CARE
Problem: Patient Care Overview  Goal: Plan of Care/Patient Progress Review  Outcome: No Change  Observation goals PRIOR TO DISCHARGE     Comments: List all goals to be met before discharge home:   - Serial troponins and stress test complete. -- No  - Seen and cleared by consultant if applicable -- No  - Adequate pain control on oral analgesia -- Yes  - Vital signs normal or at patient baseline -- Yes  - Safe disposition plan has been identified -- No  - Nurse to notify provider when observation goals have been met and patient is ready for discharge.

## 2018-09-04 NOTE — PROGRESS NOTES
Patient complained of increased SOB and some nausea. HR in 60s, /76. MD notified. 12 lead ekg ordered. States feeling better though.

## 2018-09-04 NOTE — PLAN OF CARE
Problem: Patient Care Overview  Goal: Plan of Care/Patient Progress Review  Outcome: No Change  Observation goals PRIOR TO DISCHARGE     Comments: List all goals to be met before discharge home:   - Serial troponins and stress test complete. NO: stress test in AM, trops negx3  - Seen and cleared by consultant if applicable NO  - Adequate pain control on oral analgesia YES  - Vital signs normal or at patient baseline YES: pt lindsey at baseline  - Safe disposition plan has been identified YES: prior living arrangement  - Nurse to notify provider when observation goals have been met and patient is ready for discharge.     Pt is A&Ox4, VSS, and tolerating a clear liquid diet prior to stress test. Pt is up independently and denying SOB, CP, N/V, and dizziness. Pt runs bradycardic at baseline, but has been hovering around the low 40's while sleeping. Pt is asymptomatic. Provider notified of low HR, all other vitals remain stable. Nurse will continue to monitor.

## 2018-09-04 NOTE — PLAN OF CARE
Problem: Patient Care Overview  Goal: Plan of Care/Patient Progress Review  Outcome: No Change  Outpatient/Observation goals to be met before discharge home:  BP (!) 155/92  Temp 98.4  F (36.9  C) (Oral)  Resp 17  SpO2 97%    - Serial troponins and stress test complete.- Troponin's all done and negative. Stress test to be done in the morning, possibly followed by MRI.  - Seen and cleared by consultant if applicable - Yes  - Adequate pain control on oral analgesia - Yes  - Vital signs normal or at patient baseline -No. Heart rate bradycardic, dipping down to 42, and high blood pressure still, 155/92  - Safe disposition plan has been identified -NA  - Nurse to notify provider when observation goals have been met and patient is ready for discharge.  Patient c/o tingling still in hands, was present when first arrived in ED.

## 2018-09-04 NOTE — PROGRESS NOTES
I was notified of patients HR dropping to the high 30's. BP normal. No chest pain, lightheadedness, nausea. Continues to have arm numbness and tingling. EKG done which showed sinus bradycardia in the 40's.   Reassured patient and discussed plan for stress echo in the AM and if negative then MRI Brain.    Horacio Barone PA-C

## 2018-09-04 NOTE — PLAN OF CARE
Problem: Patient Care Overview  Goal: Plan of Care/Patient Progress Review  Outcome: Improving  Observation goals PRIOR TO DISCHARGE     Comments: List all goals to be met before discharge home:   - Serial troponins and stress test complete. NO: stress test in AM, trops negx3  - Seen and cleared by consultant if applicable NO  - Adequate pain control on oral analgesia YES  - Vital signs normal or at patient baseline YES: pt lindsey at baseline  - Safe disposition plan has been identified YES: prior living arrangement  - Nurse to notify provider when observation goals have been met and patient is ready for discharge.      Pt dropped to a HR of 37-39 approximately 4 times while sleeping. Pt asymptomatic, vitals remain stable, and pt denying chest pain. Provider notified, 12-lead EKG ordered which showed sinus bradycardia. Nurse will continue to monitor.

## 2018-09-04 NOTE — PLAN OF CARE
"Problem: Patient Care Overview  Goal: Plan of Care/Patient Progress Review  Outcome: No Change   Observation goals PRIOR TO DISCHARGE     Comments: List all goals to be met before discharge home:   - Serial troponins and stress test complete. -- No  - Seen and cleared by consultant if applicable -- No  - Adequate pain control on oral analgesia -- Yes  - Vital signs normal or at patient baseline -- No  - Safe disposition plan has been identified -- No  - Nurse to notify provider when observation goals have been met and patient is ready for discharge.    Plans for stress test and MRI today. Patient continues to state intermittent chest \"pressure\" and SOB. Reports better with activity. Continues to be bradycardic. Tele SB. Up independently. A and O x4.               "

## 2018-09-04 NOTE — PROGRESS NOTES
8:18 AM The patient was seen and examined by me and the case was discussed with the SHAUNNA. We are in agreement with the assessment and plan.  Past Medical History:   Diagnosis Date     Diverticulosis      Seasonal allergies      Substance dependence, in remission (H) 1993     Social History     Social History     Marital status:      Spouse name: N/A     Number of children: N/A     Years of education: N/A     Occupational History     Not on file.     Social History Main Topics     Smoking status: Former Smoker     Quit date: 1/1/1998     Smokeless tobacco: Never Used     Alcohol use No     Drug use: Yes      Comment: in remission 1993, marijuana, ETOH, narcotic (no IV use)     Sexual activity: Yes     Partners: Female     Other Topics Concern     Not on file     Social History Narrative    Self-employed, ethics and leadership education. , has 4 yo son plus two children from previous marriage. Non-smoker. No alcohol use (abstains due to remote hx of alcoholism), no other drug use.      This is a pleasant generally healthy 48-year-old male who had unusual symptoms that brought him to the emergency department.  He was at home when he had an episode of shortness of breath spontaneously without exertion or provoking factors.  He also complained of some pain in his posterior chest as well as paresthesia in his left hand.  Unusually he also complained of paresthesia in his right hand to a lesser extent.  He did have a neurological evaluation by the resident and the recommendation was brain MRI assuming his exercise stress test was negative.  He has no documented history of coronary disease he has no risk factors for peripheral vascular disease.  He was recently started on ACE inhibitor for hypertension through his primary care provider at [facility] Dr. Dorsey.  He is currently asymptomatic other than paresthesia in the left hand.  He has no neck pain.    Physical exam:  General: Awake alert  in no acute distress  Cardiac: Regular rate and rhythm no murmurs rubs or gallops  Respiratory: Lungs are clear  Neck: Supple no carotid bruits    Assessment and plan: 48-year-old male with episode of unprovoked dyspnea while at rest now resolved.  This is accompanied by back pain and arm paresthesia which is persistent.  Plan is to obtain exercise stress test and then proceed to brain MRI per neurology.  Patient request to inform Dr. Dorsey that he is here.  We will proceed depending on above results.

## 2018-09-05 ENCOUNTER — APPOINTMENT (OUTPATIENT)
Dept: CARDIOLOGY | Facility: CLINIC | Age: 48
DRG: 093 | End: 2018-09-05
Payer: COMMERCIAL

## 2018-09-05 ENCOUNTER — APPOINTMENT (OUTPATIENT)
Dept: CT IMAGING | Facility: CLINIC | Age: 48
DRG: 093 | End: 2018-09-05
Payer: COMMERCIAL

## 2018-09-05 PROBLEM — O99.820 GBS (GROUP B STREPTOCOCCUS CARRIER), +RV CULTURE, CURRENTLY PREGNANT: Status: ACTIVE | Noted: 2018-09-05

## 2018-09-05 PROCEDURE — 25000132 ZZH RX MED GY IP 250 OP 250 PS 637: Performed by: INTERNAL MEDICINE

## 2018-09-05 PROCEDURE — 25500064 ZZH RX 255 OP 636: Performed by: INTERNAL MEDICINE

## 2018-09-05 PROCEDURE — 75574 CT ANGIO HRT W/3D IMAGE: CPT | Performed by: INTERNAL MEDICINE

## 2018-09-05 PROCEDURE — 82172 ASSAY OF APOLIPOPROTEIN: CPT | Performed by: PSYCHIATRY & NEUROLOGY

## 2018-09-05 PROCEDURE — 25000132 ZZH RX MED GY IP 250 OP 250 PS 637: Performed by: PSYCHIATRY & NEUROLOGY

## 2018-09-05 PROCEDURE — G0378 HOSPITAL OBSERVATION PER HR: HCPCS

## 2018-09-05 PROCEDURE — 25000128 H RX IP 250 OP 636: Performed by: INTERNAL MEDICINE

## 2018-09-05 PROCEDURE — 75574 CT ANGIO HRT W/3D IMAGE: CPT

## 2018-09-05 PROCEDURE — 40000141 ZZH STATISTIC PERIPHERAL IV START W/O US GUIDANCE

## 2018-09-05 PROCEDURE — 99222 1ST HOSP IP/OBS MODERATE 55: CPT | Mod: GC | Performed by: INTERNAL MEDICINE

## 2018-09-05 PROCEDURE — 25000132 ZZH RX MED GY IP 250 OP 250 PS 637: Performed by: NURSE PRACTITIONER

## 2018-09-05 PROCEDURE — 36415 COLL VENOUS BLD VENIPUNCTURE: CPT | Performed by: PSYCHIATRY & NEUROLOGY

## 2018-09-05 PROCEDURE — 93306 TTE W/DOPPLER COMPLETE: CPT | Mod: 26 | Performed by: INTERNAL MEDICINE

## 2018-09-05 PROCEDURE — 40000264 ECHO COMPLETE WITH OPTISON

## 2018-09-05 PROCEDURE — 12000008 ZZH R&B INTERMEDIATE UMMC

## 2018-09-05 RX ORDER — METOPROLOL TARTRATE 1 MG/ML
5-15 INJECTION, SOLUTION INTRAVENOUS
Status: DISCONTINUED | OUTPATIENT
Start: 2018-09-05 | End: 2018-09-05 | Stop reason: CLARIF

## 2018-09-05 RX ORDER — ACYCLOVIR 200 MG/1
0-1 CAPSULE ORAL
Status: DISCONTINUED | OUTPATIENT
Start: 2018-09-05 | End: 2018-09-06 | Stop reason: HOSPADM

## 2018-09-05 RX ORDER — NITROGLYCERIN 0.4 MG/1
0.4 TABLET SUBLINGUAL
Status: DISCONTINUED | OUTPATIENT
Start: 2018-09-05 | End: 2018-09-05 | Stop reason: CLARIF

## 2018-09-05 RX ORDER — METOPROLOL TARTRATE 50 MG
50-100 TABLET ORAL
Status: DISCONTINUED | OUTPATIENT
Start: 2018-09-05 | End: 2018-09-05 | Stop reason: CLARIF

## 2018-09-05 RX ORDER — AMLODIPINE BESYLATE 2.5 MG/1
2.5 TABLET ORAL DAILY
Status: DISCONTINUED | OUTPATIENT
Start: 2018-09-05 | End: 2018-09-06 | Stop reason: HOSPADM

## 2018-09-05 RX ORDER — ATORVASTATIN CALCIUM 40 MG/1
40 TABLET, FILM COATED ORAL EVERY EVENING
Status: DISCONTINUED | OUTPATIENT
Start: 2018-09-05 | End: 2018-09-06 | Stop reason: HOSPADM

## 2018-09-05 RX ORDER — IOPAMIDOL 755 MG/ML
120 INJECTION, SOLUTION INTRAVASCULAR ONCE
Status: COMPLETED | OUTPATIENT
Start: 2018-09-05 | End: 2018-09-05

## 2018-09-05 RX ADMIN — LISINOPRIL 5 MG: 2.5 TABLET ORAL at 21:43

## 2018-09-05 RX ADMIN — ACETAMINOPHEN 650 MG: 325 TABLET, FILM COATED ORAL at 18:41

## 2018-09-05 RX ADMIN — HUMAN ALBUMIN MICROSPHERES AND PERFLUTREN 6 ML: 10; .22 INJECTION, SOLUTION INTRAVENOUS at 11:15

## 2018-09-05 RX ADMIN — ATORVASTATIN CALCIUM 40 MG: 40 TABLET, FILM COATED ORAL at 20:14

## 2018-09-05 RX ADMIN — AMLODIPINE BESYLATE 2.5 MG: 2.5 TABLET ORAL at 18:41

## 2018-09-05 RX ADMIN — ACETAMINOPHEN 650 MG: 325 TABLET, FILM COATED ORAL at 14:42

## 2018-09-05 RX ADMIN — ACETAMINOPHEN 650 MG: 325 TABLET, FILM COATED ORAL at 23:37

## 2018-09-05 RX ADMIN — IOPAMIDOL 120 ML: 755 INJECTION, SOLUTION INTRAVENOUS at 13:24

## 2018-09-05 RX ADMIN — NITROGLYCERIN 0.4 MG: 0.4 TABLET SUBLINGUAL at 12:59

## 2018-09-05 ASSESSMENT — ACTIVITIES OF DAILY LIVING (ADL)
TRANSFERRING: 0-->INDEPENDENT
RETIRED_EATING: 0-->INDEPENDENT
RETIRED_COMMUNICATION: 0-->UNDERSTANDS/COMMUNICATES WITHOUT DIFFICULTY
COGNITION: 0 - NO COGNITION ISSUES REPORTED
SWALLOWING: 0-->SWALLOWS FOODS/LIQUIDS WITHOUT DIFFICULTY
TOILETING: 0-->INDEPENDENT
DRESS: 0-->INDEPENDENT
AMBULATION: 0-->INDEPENDENT
FALL_HISTORY_WITHIN_LAST_SIX_MONTHS: NO
ADLS_ACUITY_SCORE: 9
BATHING: 0-->INDEPENDENT

## 2018-09-05 NOTE — PROGRESS NOTES
Pt awaiting CT angiogram. Echo completed at bedside. Pt denies any chest pain or shortness of breath. Pt continues to have numbness and tingling in bilateral upper extremities.

## 2018-09-05 NOTE — SIGNIFICANT EVENT
Patient ID:  Lui Arrington  MRN: [id number]  48 year old  YOB: 1970    Observation Admit Date: 9/3/2018    ED Admitting Attending: Yamilka Oseguera MD    Transfer Date and Time: September 5, 2018 at 2:25 PM     Transferring Observation Provider: Dulce Maria Lopez PA-C    Admission Diagnoses:     1. SOB (shortness of breath)    2. Paresthesias in left hand        Transfer Diagnoses:    Paresthesias, bilateral upper extremities  SOB  Chest pain    Emergency Department and Observation Course:     1. Chest Pain, SOB, Paresthesia, bilateral upper extremities: Paresthesia started 2 days ago with sudden onset SOB and chest pressure. Chest x-ray was clear, labs were WNL, troponin was normal. EKG shows SB. Patient admitted to ED obs for ACS r/o. Pt underwent stress echo yesterday and this showed 1 mm ST segment depression in the inferior and lateral leads that normalize to baseline 2 minutes into recovery. Cardiology consulted, and recommended further studies including CT coronary and JOSE MANUEL which are pending. Patient is also followed by neurology for paresthesia in the upper extremities bilaterally. He had a brain MR for stroke protocol as well as MR cervical spine and both were negative for acute findings. They are proceeding with LP thus patient is admitted to neurology for further work up and treatment. Cardiology will still follow the patient for further planing from cardiac stand point.     At this time the patient has failed observation management due to requiring further work up in assessing his paresthesias and will be transferred to neurology.    Consults: Neurology, Cardiology    DATA:    Transfer Exam:    /89 (BP Location: Right arm)  Pulse (!) 42  Temp 97.9  F (36.6  C) (Oral)  Resp 16  SpO2 97%  Physical Exam   Constitutional: Pt is oriented to person, place, and time.Pt appears well-developed and well-nourished.    Head: Normocephalic and atraumatic.   Eyes: Conjunctivae are  normal. Pupils are equal, round, and reactive to light.   Neck: Normal range of motion. Neck supple.   Cardiovascular: Normal rate, regular rhythm, normal heart sounds and intact distal pulses.    Pulmonary/Chest: Effort normal and breath sounds normal. No respiratory distress. Pt has no wheezes. Pt has no rales  Abdominal: Soft. Bowel sounds are normal. Pt exhibits no distension and no mass. No tenderness. Pt has no rebound and no guarding.   Musculoskeletal: Normal range of motion. Pt exhibits no edema.   Neurological: Pt is alert and oriented to person, place, and time. Normal reflexes.   Skin: Skin is warm and dry. No rash noted.   Psychiatric: Pt has a normal mood and affect. Behavior is normal. Judgment and thought content normal.       Current Medications:    No current outpatient prescriptions on file.       Medications Prior to Admission:    Prescriptions Prior to Admission   Medication Sig Dispense Refill Last Dose     lisinopril (PRINIVIL/ZESTRIL) 5 MG tablet Take 1 tablet (5 mg) by mouth daily 30 tablet 1        Significant Diagnostic Studies:     Results for orders placed or performed during the hospital encounter of 09/03/18   XR Chest 2 Views    Narrative    Exam:  Chest X-ray 9/3/2018 3:15 PM    History: shortness of breath, back pain;     Comparison: Shortness of breath, back pain    Findings: PA and lateral views of the chest. The trachea is midline.  The cardiomediastinal silhouette is within normal limits. Pulmonary  vasculature is distinct. No pleural effusion or pneumothorax. No focal  pulmonary opacities. No acute bony abnormalities. The upper abdomen is  unremarkable.      Impression    Impression:   No acute cardiopulmonary abnormalities.    I have personally reviewed the examination and initial interpretation  and I agree with the findings.    SHARMILA SHEPARD MD   MR Brain for Stroke [facility] w/o & w Contras    Narrative    MRI brain without and with contrast  MRA of the head without  contrast  Neck MRA without and with contrast    Provided History:  arm paresthesias.    Comparison:  None      Technique:   Brain MRI:  Axial diffusion, FLAIR, T2-weighted, susceptibility, and  coronal T1-weighted images were obtained without intravenous contrast.  Following intravenous gadolinium-based contrast administration, axial  and coronal T1-weighted images were obtained.    Head MRA: 3D time-of-flight MRA of the [place] was performed  without intravenous contrast.  Neck MRA:  Limited non contrast 2DTOF images were obtained of the  mid-cervical region. Following intravenous gadolinium-based contrast  administration, a contrast enhanced MRA of the neck/cervical vessels  was performed.  Three-dimensional reconstructions of the neck and head MRA were  created, which were reviewed by the radiologist.    Dose: 10mL Gadavist    Findings:   Brain MRI: No restricted diffusion. There is no intracranial  hemorrhage on susceptibility images. Ventricles are proportionate to  the cerebral sulci. Contrast-enhanced images of the brain demonstrate  no abnormal enhancement. Incidental developmental venous anomaly in  the anteromedial left frontal lobe.    Head MRA demonstrates a 1.6 x 0.8 mm saccular aneurysm arising from  the right MCA bifurcation. No stenosis of the major intracranial  arteries.    Neck MRA demonstrates patent major cervical arteries. The normal  distal right internal carotid artery measures 5 mm. The normal distal  left internal carotid artery measures 4 mm. Antegrade flow in the  major cervical vasculature.      Impression    Impression:  1. No acute infarct.  2. Head MRA demonstrates a 1.6 mm saccular aneurysm arising from the  right MCA bifurcation. No stenosis of the major intracranial arteries.  3. Neck MRA demonstrates patent major cervical arteries without  stenosis.    I have personally reviewed the examination and initial interpretation  and I agree with the findings.    ANDI ESCOBEDO,  MD   MR Cervical Spine w/o & w Contrast    Narrative    MR CERVICAL SPINE W/O & W CONTRAST 9/4/2018 9:06 PM    Provided History: Left and right hand sensory changes, acute onset.    Comparison: None available    Technique: Sagittal T1-weighted, sagittal T2-weighted, sagittal STIR,  and axial T2-weighted images of the cervical spine were obtained  without intravenous contrast. Following intravenous administration of  gadolinium, axial and sagittal T1-weighted images with fat saturation  were also obtained.    Contrast: 10mL Gadavist    Findings:  The cervical vertebrae are normally aligned.  There is no disc height  narrowing at any level.  No abnormal cord signal.  There is no  abnormal contrast enhancement within the cervical spinal cord, thecal  sac or vertebral column.  The findings on a level by level basis are  as follows:    C2-3: No spinal canal or neural foraminal narrowing.    C3-4:  No spinal canal or neural foraminal narrowing.    C4-5:  No spinal canal or neural foraminal narrowing.    C5-6:  Left central disc protrusion indents the left ventral cord and  results in mild spinal canal narrowing. Mild bilateral facet  hypertrophy. Mild left neural foraminal narrowing. No right neural  foraminal stenosis.     C6-7:  Mild uncinate and facet hypertrophy. Mild left neural foraminal  narrowing. No right neural foraminal stenosis. No spinal canal  stenosis.    C7-T1:  Bilateral facet hypertrophy. No spinal canal or neural  foraminal stenosis.     No abnormality of the paraspinous soft tissues.      Impression    Impression:   1. Mild cervical spondylosis, most pronounced at C5-6.  2. No cord signal abnormality.    I have personally reviewed the examination and initial interpretation  and I agree with the findings.    ANDI ESCOBEDO MD   CT Angiogram coronary artery    Narrative    Procedure: CTA ANGIOGRAM CORONARY ARTERY   Examination Date: 9/5/2018 1:49 PM   Indication: non anginal symptoms and 1 mm ST  depression inf/lat leads  echo stress;    Ordering Provider: Raymon Guzman  Overall quality of the study: Fair due to obesity.     PROCEDURE: ECG gated multi-slice computed tomography of the heart   with and without intravenous contrast  (Isovue 370, 120 mL, wasted 0  mL) was  performed on a Siemens Dual Source Flash scanner without  incident. Beta-blockers were not used to optimize heart rate.  Sublingual Nitrostat 0.4 mg was given prior to scanning. Coronary  artery calcium score was performed using the Flash scanner protocol.  CTA was performed in the spiral mode at a heart rate of 55 bpm with  100 kVp. Images were reconstructed and analyzed on a Memvu  workstation. Scan protocol was optimized to minimize radiation  exposure. The total radiation exposure including calcium score was  calculated to be 456 DLP, and 6.4 mSv.        Impression    IMPRESSION:  1.  High plaque burden with serial stenosis in the proximal to mid LAD  that are moderate-severe in severity. Recommend further evaluation  with invasive angiogram. Results conveyed to team. Minimal disease in  the RCA and circumflex.  2.  Total Agatston score 189 placing the patient in the 95th  percentile when compared to age and gender matched control group.  3.  Please review Radiology report for incidental noncardiac findings  that will follow separately.    FINDINGS:    CORONARY CALCIUM SCORE    Total Agatston calcium score: 189   Left main: 3  Left anterior descendin  Left circumflex: 0  Right coronary artery: 16   This places the patient in the 95th percentile when compared to age  and gender matched control group.    CORONARY ANGIOGRAPHY    DOMINANCE: Right dominant system.   Normal coronary origins and course.    LEFT MAIN:   The left main arises normally from the left coronary cusp and has a  minimal (<25%) distal stenosis composed of noncalcified plaque.    LEFT ANTERIOR DESCENDING:   Proximal LAD: Moderate (50-69%) stenosis composed of mixed  plaque.  Mid LAD: Moderate-severe (50-69%) stenosis composed of noncalcified  plaque.  Second diagonal: Mild (25-49%) stenosis composed of noncalcified  plaque.  The remainder of the left anterior descending and its major diagonal  branches are patent with minimal luminal irregularities.    LEFT CIRCUMFLEX:   The left circumflex and its major marginal branches are patent with  minimal luminal irregularities    RIGHT CORONARY ARTERY:   Mid RCA: Minimal (<25%) stenosis composed of mixed plaque.  The remainder of the right coronary and the posterior descending  artery are patent with minimal luminal irregularities.    ADDITIONAL FINDINGS:     The proximal ascending aorta is normal in size.   Normal pulmonary venous anatomy with all four pulmonary veins draining  into the left atrium.    There is no left ventricular mass or thrombus.   Normal pericardial thickness. There is no pericardial effusion.  Please review Radiology report for incidental noncardiac findings that  will follow separately.    OPAL DUMONT MD   CBC with platelets differential   Result Value Ref Range    WBC 6.6 4.0 - 11.0 10e9/L    RBC Count 5.38 4.4 - 5.9 10e12/L    Hemoglobin 15.2 13.3 - 17.7 g/dL    Hematocrit 45.6 40.0 - 53.0 %    MCV 85 78 - 100 fl    MCH 28.3 26.5 - 33.0 pg    MCHC 33.3 31.5 - 36.5 g/dL    RDW 13.7 10.0 - 15.0 %    Platelet Count 149 (L) 150 - 450 10e9/L    Diff Method Automated Method     % Neutrophils 46.1 %    % Lymphocytes 40.8 %    % Monocytes 9.2 %    % Eosinophils 2.9 %    % Basophils 0.5 %    % Immature Granulocytes 0.5 %    Nucleated RBCs 0 0 /100    Absolute Neutrophil 3.1 1.6 - 8.3 10e9/L    Absolute Lymphocytes 2.7 0.8 - 5.3 10e9/L    Absolute Monocytes 0.6 0.0 - 1.3 10e9/L    Absolute Eosinophils 0.2 0.0 - 0.7 10e9/L    Absolute Basophils 0.0 0.0 - 0.2 10e9/L    Abs Immature Granulocytes 0.0 0 - 0.4 10e9/L    Absolute Nucleated RBC 0.0    D dimer quantitative   Result Value Ref Range    D Dimer 0.5 0.0 - 0.50  ug/ml FEU   INR   Result Value Ref Range    INR 0.92 0.86 - 1.14   Partial thromboplastin time   Result Value Ref Range    PTT 30 22 - 37 sec   Comprehensive metabolic panel   Result Value Ref Range    Sodium 139 133 - 144 mmol/L    Potassium 4.0 3.4 - 5.3 mmol/L    Chloride 106 94 - 109 mmol/L    Carbon Dioxide 27 20 - 32 mmol/L    Anion Gap 6 3 - 14 mmol/L    Glucose 93 70 - 99 mg/dL    Urea Nitrogen 15 7 - 30 mg/dL    Creatinine 0.86 0.66 - 1.25 mg/dL    GFR Estimate >90 >60 mL/min/1.7m2    GFR Estimate If Black >90 >60 mL/min/1.7m2    Calcium 8.8 8.5 - 10.1 mg/dL    Bilirubin Total 0.6 0.2 - 1.3 mg/dL    Albumin 4.0 3.4 - 5.0 g/dL    Protein Total 8.0 6.8 - 8.8 g/dL    Alkaline Phosphatase 64 40 - 150 U/L    ALT 29 0 - 70 U/L    AST 20 0 - 45 U/L   Troponin I   Result Value Ref Range    Troponin I ES <0.015 0.000 - 0.045 ug/L   Nt probnp inpatient   Result Value Ref Range    N-Terminal Pro BNP Inpatient 61 0 - 900 pg/mL   Troponin I   Result Value Ref Range    Troponin I ES <0.015 0.000 - 0.045 ug/L   Troponin I - Now then in 4 hours x 2    Result Value Ref Range    Troponin I ES <0.015 0.000 - 0.045 ug/L   Troponin I - Now then in 4 hours x 2    Result Value Ref Range    Troponin I ES <0.015 0.000 - 0.045 ug/L   Glucose by meter   Result Value Ref Range    Glucose 155 (H) 70 - 99 mg/dL   TSH with free T4 reflex   Result Value Ref Range    TSH 1.52 0.40 - 4.00 mU/L   Lyme Disease Fanta with reflex to WB Serum   Result Value Ref Range    Lyme Disease Antibodies Serum 0.04 0.00 - 0.89   EKG 12 lead   Result Value Ref Range    Interpretation ECG Click View Image link to view waveform and result    EKG 12-lead, tracing only   Result Value Ref Range    Interpretation ECG Click View Image link to view waveform and result    EKG 12-lead, complete   Result Value Ref Range    Interpretation ECG Click View Image link to view waveform and result    EKG 12-lead, tracing only   Result Value Ref Range    Interpretation ECG  "Click View Image link to view waveform and result    Cardiology General Adult IP Consult: Patient to be seen: Routine within 24 hrs; Call back #: [id number]; episodes of Bradycardia to HR 49, abnormal stress test, in ED OBS for chest pain.; Consultant may enter orders: Yes    Narrative    Pretty Pinto MD     9/5/2018 10:18 AM        [facility]   Cardiology Inpatient Consultation    September 5, 2018    Reason for Consult:  A cardiology consult was requested by Elizabeth Nieves CNP from the   Medicine service to provide clinical guidance regarding positive   stress test.    HPI:   Lui Arrington is a 48 year old male with a significant past   medical history for HTN, remote history of tobacco/EtOH abuse   and, diverticulitis who presented to [facility] ED after developing L   hand glove-like tingling sensation on Sunday that resolved after   1 hour but came back on Monday morning and developed sob while   reading a book to his son around 11AM with R sided back stabbing   pain. Symptoms didn't resolve and patient presented to the ED   around 1PM.    On admission, patient was found to be hypertensive 184/93,   bradycardic 53, EKG unremarkable, troponin, BNP WNL, and CXR   unremarkable. Neurology was consulted and obtained MRI which was   unremarkable other than showing a 1.6 mm saccular aneurysm (R MCA   bifurcation). Stress echocardiogram was ordered due to \"chest   pains\" and showed a positive hypertensive response to exercise   with 1 mm ST depression in inferior and lateral leads which   normalized within 2 minutes into recovery. Theres was no WMA and   EF was 55-60% at rest an with exercise.     At the time of interview, the patient denies chest pain this am,   dyspnea occurs intermittently, no orthopnea, no palpitations, no   lightheadedness, he continues to have L handed tingling   sensation.     Review of Systems:    Complete review of systems was performed and negative except per "   HPI.    PMH:  Past Medical History:   Diagnosis Date     Diverticulosis      Seasonal allergies      Substance dependence, in remission (H) 1993     Active Problems:  Patient Active Problem List    Diagnosis Date Noted     Diverticulitis of colon 10/22/2014     Priority: Medium     Diverticulitis of colon with perforation 05/30/2014     Priority: Medium     Social History:  Social History   Substance Use Topics     Smoking status: Former Smoker     Quit date: 1/1/1998     Smokeless tobacco: Never Used     Alcohol use No     Family History:  Family History   Problem Relation Age of Onset     Cancer Mother      Cancer - colorectal Mother      Hypertension Mother      Crohn Disease Mother      Cancer Father      Diabetes Maternal Grandmother      C.A.D. Maternal Grandmother      C.A.D. Paternal Grandfather      Hypertension Brother        Medications:    aspirin  81 mg Oral Daily     lisinopril  5 mg Oral Daily     sodium chloride (PF)  3 mL Intracatheter Q8H           Physical Exam:  Temp:  [97.7  F (36.5  C)-97.8  F (36.6  C)] 97.8  F (36.6  C)  Pulse:  [42] 42  Heart Rate:  [44-47] 47  Resp:  [16] 16  BP: (135-136)/(81-85) 135/81  SpO2:  [95 %-99 %] 95 %    Intake/Output Summary (Last 24 hours) at 09/05/18 0809  Last data filed at 09/05/18 0500   Gross per 24 hour   Intake              240 ml   Output                0 ml   Net              240 ml     GEN: Pleasant, no acute distress  HEENT: no icterus.  No evidence of cranial trauma.  CV: RRR, normal s1/s2, no murmurs/rubs/s3/s4, no heave. No JVD.   CHEST: clear to ausculation bilaterally, no rales or wheezing  ABD: soft, non-tender, normal active bowel sounds  EXTR: pulses present throughout. No clubbing, cyanosis. There is   no pitting edema.  NEURO: alert oriented, speech fluent/appropriate, motor grossly   nonfocal    Diagnostics:  All labs and imaging were reviewed, of note:      Recent Labs  Lab 09/03/18  1341   WBC 6.6   HGB 15.2   *       Recent  Labs  Lab 18  1341      POTASSIUM 4.0   CHLORIDE 106   CO2 27   BUN 15   CR 0.86   GLC 93   MARIO 8.8                                                                                :    Recent Labs  Lab 18  1341   NTBNPI 61       Lab Results   Component Value Date    TROPI <0.015 2018    TROPI <0.015 2018    TROPI <0.015 2018    TROPI <0.015 2018    TROPONIN 0.01 2018       [id number]      Echo stress test: 9/3/18  Hypertensive blood pressure response to exercise.  No angina symptoms with exercise.  1 mm ST segment depression in the inferior and lateral leads that   normalize to  baseline 2 minutes into recovery.  Normal segmental and global LV function with EF of approximately   55-60% at  rest; with exercise left ventricular ventricular cavity size   decreases and  LVEF increases to 65-70%. No stress induced regional wall motion  abnormalities.  Average functional capacity for age.  Normal aortic root and no significant valvular dysfunction noted   on screening  2D and Doppler examination.    Assessment and Recommendation:  Assessment & Plan   Lui Arrington is a 48 year old male with PMH significant   for  HTN, remote history of tobacco/EtOH abuse and,   diverticulitis who presented to [facility] ED with L hand tingling, R   sided stabbing back pain and shortness of breath. Cardiology   consulted after positive stress echo.     1. Positive stress test   Patient with non-anginal symptoms since , and now positive   EKG portion of echo stress (1 mm ST depression in inferior and   lateral leads) but no WMA present. This stress test was performed   on a patient who was hypertensive and with active chest pain   which is a contraindication for a stress test. Target heart rate   was not met, and stress test was stopped due to fatigue and   hypertensive response, making this stress test difficult truly   interpret. Patient with risk factors including, HTN, remote    history of tobacco abuse and elevated BMI. Family history of   heart disease includes grandparents with MI's >65 y.o but no 1st   degree relatives. ASCVD score is 3.3%, hence patient is not on a   statin. HAVRINDER risk score on admission was 0.   Plan:  -Coronary CTa  -TTE   -Continue Lisinopril 5 mg every day   -Add Amlodipine 2.5 mg every day at bedtime          I have discussed the above with Dr. Rosario.    Thank you for consulting the cardiovascular services at the   [facility]. Please do not hesitate to   call us with any questions.     Pretty Ennis MD  Internal Medicine, PGY-3       Troponin POCT   Result Value Ref Range    Troponin I 0.01 0.00 - 0.10 ug/L   Echo stress test with definity    Narrative    [id number]  [id number]  [id number]  [id number]^SHDA^KATERIN^LEIA           [facility],[place]  Echocardiography Laboratory  [address] [place] [id number]  Name: NERISSA MILLS  MRN: [id number]  : 1970  Study Date: 2018 08:54 AM  Age: 48 yrs  Gender: Male  Patient Location: [facility]  Reason For Study: Chest Pain  Ordering Physician: KATERIN KULKARNI  Performed By: Jos Matthews RDCS     BSA: 2.5 m2  Height: 75 in  Weight: 281 lb  HR: 46  BP: 148/89 mmHg  _____________________________________________________________________________  __        Procedure  Stress Echo Bike with two dimensional, color and spectral Doppler performed.  Contrast Definity.  _____________________________________________________________________________  __        Interpretation Summary  Near maximal stress test, achieved 81% of the age predicted maximal heart  rate. Limiting symptom fatigue.     Hypertensive blood pressure response to exercise.  No angina symptoms with exercise.  1 mm ST segment depression in the inferior and lateral leads that normalize to  baseline 2 minutes into recovery.  Normal segmental and global LV function with EF of approximately 55-60%  at  rest; with exercise left ventricular ventricular cavity size decreases and  LVEF increases to 65-70%. No stress induced regional wall motion  abnormalities.  Average functional capacity for age.  Normal aortic root and no significant valvular dysfunction noted on screening  2D and Doppler examination.  _____________________________________________________________________________  __     Stress  There was a hypertensive BP response to exercise.  This was an abnormal stress EKG.  Target Heart Rate was not achieved due to fatigue.  Sensitivity reduced due to submaximal heart rate.  The patient did not exhibit any symptoms during exercise.  Limiting Symptom: fatigue.  Peak MVO2 19.2 ml/kg/min .  Percent predicted MVO2 67 %.  RPP 26109.  Maximum workload 175 osorio.  Exercise was stopped due to fatigue.     Stress Results                                       Maximum Predicted HR:   170 bpm             Target HR: 145 bpm        % Maximum Predicted HR: 81 %                             Stage DurationHeart Rate   BP                                 (mm:ss)   (bpm)                        Baseline            46     148/89                          Peak    10:55     138   253/111                           Stress Duration:   10:55 mm:ss *                     Maximum Stress HR: 138 bpm *     Left Ventricle  Left ventricular systolic function is normal.     Aortic Valve  The aortic valve is normal in structure and function.     Mitral Valve  There is mild mitral annular calcification.        Tricuspid Valve  The tricuspid valve is normal in structure and function.     Right Ventricle  The right ventricular systolic function is normal.     Pericardium  There is no pericardial effusion.     Contrast  Definity (NDC #59271-771-07) given intravenously. Patient was given 5ml  mixture of 1.5ml Definity and 8.5ml saline. 5 ml wasted.     _____________________________________________________________________________  __  MMode/2D  Measurements & Calculations  asc Aorta Diam: 3.9 cm        Doppler Measurements & Calculations  PA acc time: 0.12 sec              _____________________________________________________________________________  __        Report approved by: Merritt León 2018 10:25 AM      ECHO COMPLETE WITH OPTISON    Narrative    [id number]  [id number]  [id number]  [id number]^CARLTON CUELLO^LORENA           [facility],[place]  Echocardiography Laboratory  [address] [place] [address]     Name: NERISSA MILLS  MRN: [id number]  : 1970  Study Date: 2018 10:44 AM  Age: 48 yrs  Gender: Male  Patient Location: [facility]  Reason For Study: Abn Stress Test  Ordering Physician: LORENA KYLE  Performed By: Newton Patrick RDCS     BSA: 2.5 m2  Height: 75 in  Weight: 275 lb  BP: 146/89 mmHg  _____________________________________________________________________________  __        Procedure  Complete Portable Echo Adult. Contrast Optison. Technically difficult study.  Optison (NDC #5432-5160-58) given intravenously. Patient was given 6 ml  mixture of 3 ml Optison and 6 ml saline. 3 ml wasted.  _____________________________________________________________________________  __        Interpretation Summary  Global and regional left ventricular function is normal with an EF of 55-60%.  Global right ventricular function is normal.  The inferior vena cava was normal in size with preserved respiratory  variability.  No pericardial effusion is present.  _____________________________________________________________________________  __        Left Ventricle  Left ventricular size is normal. Global and regional left ventricular function  is normal with an EF of 55-60%.     Right Ventricle  The right ventricle is normal size. Global right ventricular function is  normal.     Mitral Valve  The mitral valve is normal.        Aortic Valve  Aortic valve is normal in structure and function.     Tricuspid  Valve  The tricuspid valve is normal.     Vessels  The inferior vena cava was normal in size with preserved respiratory  variability.     Pericardium  No pericardial effusion is present.     Compared to Previous Study  This study was compared with the study from 18 . There has been no change.     _____________________________________________________________________________  __        Doppler Measurements & Calculations  MV E max zeferino: 62.4 cm/sec  MV A max zeferino: 77.1 cm/sec  MV E/A: 0.81  MV dec slope: 230.5 cm/sec2     E/E' av.1  Lateral E/e': 5.0  Medial E/e': 7.2     _____________________________________________________________________________  __           Report approved by: Merritt León 2018 11:39 AM          Signed:  Dulce Maria Lopez  2018 at 2:25 PM

## 2018-09-05 NOTE — PROGRESS NOTES
ER attending addendum  Please see SHAUNNA H&P and progress notes for full details.   I personally saw and examined pt and agree with the SHAUNNA's plan of care.       Pt is a 47 yo male who was admitted from the ER for SOB and left more than right hand numbess.   Pt has been seen by both cardiology and neurology.   Pt had stress that showed ST depression in inferior/lateral leads so cardiology is evaluating for further treatment.   Neurology has been followign the issues of hand numbness which appears to be progressing.   MRI/MRA stroke protocol as been negative for acute issues.     Exam:  Well appearing.,   RRR  CTA bilateral  Left hand with decreased strength and sensation on exam.   Normal right hand movement.     A/p: will transfer to neurology for further care.     Signed:  Iman Delgado MD  September 5, 2018 at 11:22 AM

## 2018-09-05 NOTE — PROGRESS NOTES
"[facility], [place]   Neurology Progress/Transfer of Care Note  9/5/2018    Subjective: Patient stated his fingers and hand feels less \"tingly\" today but now more numb.  He also stated the weakness is worsening.    Objective:    Vitals: /89 (BP Location: Right arm)  Pulse (!) 42  Temp 97.9  F (36.6  C) (Oral)  Resp 16  SpO2 97%  General: patient sitting upright in bed without any acute distress  HEENT: sclera anicteric  Cardiac: RRR  Chest: No respiratory distress  Extremities: No LE edema.    Neurologic:  Mental Status: Fully alert, attentive and oriented. Speech clear and fluent.   Cranial Nerves: Visual fields intact. PERRL. EOMI with normal smooth pursuit. Facial sensation intact/symmetric. Facial movements symmetric. Hearing not formally tested but intact to conversation. Palate elevation symmetric, uvula midline. No dysarthria. Shoulder shrug strong bilaterally. Tongue protrusion midline.  Motor: No abnormal movements. Normal tone throughout.  Strength 5/5 throughout upper and lower extremities except 4+/5 with left finger extension/flexion, thumb opposition, and finger abduction.  Reflexes: Trace in bilateral upper extremities.  1+ in bilateral lower extremities.  No clonus. Toes downgoing.  Sensory: Diminished to sharp touch sensation in bilateral upper extremities moving proximal to distal, affecting mostly the hands.  Proprioception and vibratory sensation intact.  Coordination: FNF without dysmetria.  Station/Gait: Normal casual gait.    Pertinent Investigations:      MRI brain with MRA head and neck: 1. No acute infarct. 2. Head MRA demonstrates a 1.6 mm saccular aneurysm arising from the right MCA bifurcation. No stenosis of the major intracranial arteries. 3. Neck MRA demonstrates patent major cervical arteries without Stenosis.    MRI cervical spine with and without contrast:1. Mild cervical spondylosis, most pronounced at C5-6. 2. No cord signal " abnormality.    CT coronary angiogram:  High plaque burden with serial stenosis in the proximal to mid LAD that are moderate-severe in severity. Recommend further evaluation  with invasive angiogram. Results conveyed to team. Minimal disease in the RCA and circumflex.    Echo stress test:  Interpretation Summary  Near maximal stress test, achieved 81% of the age predicted maximal heart  rate. Limiting symptom fatigue.   Hypertensive blood pressure response to exercise.  No angina symptoms with exercise.  1 mm ST segment depression in the inferior and lateral leads that normalize to  baseline 2 minutes into recovery.  Normal segmental and global LV function with EF of approximately 55-60% at  rest; with exercise left ventricular ventricular cavity size decreases and  LVEF increases to 65-70%. No stress induced regional wall motion  abnormalities.  Average functional capacity for age.  Normal aortic root and no significant valvular dysfunction noted on screening  2D and Doppler examination.    TTE:   Interpretation Summary  Global and regional left ventricular function is normal with an EF of 55-60%.  Global right ventricular function is normal.  The inferior vena cava was normal in size with preserved respiratory  variability.  No pericardial effusion is present.  _____________________________________________________________________________    Assessment/Plan:   The patient is a 48-year-old gentleman with a past medical history of hypertension who was admitted on 9/3 with chest pain, shortness of breath, and left hand paresthesias.    #Bilateral hand numbness and paresthesias  #Left hand weakness  Numbness, paresthesias, and weakness continued to slowly worsen despite resolution of chest pain and shortness of breath.  Unlikely related to cardiopulmonary process at this time.  Given the acsending fashion of the patient's symptoms GBS remains in the differential.  Further evaluation with LP pending.  - LP  tomorrow    #1.6mm right MCA bifurcation saccular aneurysm: Incidental finding found on MRI.  Follow-up with neurology as an outpatient for repeat imaging in 6 months.    #Chest pain: Resolved  #Shortness of breath: Resolved  #CAD: The patient had a positive stress test with high plaque burden noted on coronary CTA and proximal to mid LAD.  -Cardiology on board appreciate recs  -Start atorvastatin 40 mg daily  -Continue ASA 81 mg daily    #Hypertension: Continue PTA lisinopril and start amlodipine    Diet: Regular  DVT PPX: SCDs  GI PPX: Not indicated  CODE STATUS: Full    Dispo: Pending further evaluation with lumbar puncture    Patient seen and discussed with Dr. Issa, attending.    Jonathan Thacker MD  Neurology PGY3  [phone]

## 2018-09-05 NOTE — CONSULTS
"      [facility]   Cardiology Inpatient Consultation    September 5, 2018    Reason for Consult:  A cardiology consult was requested by Elizabeth Nieves CNP from the Medicine service to provide clinical guidance regarding positive stress test.    HPI:   Lui Arrington is a 48 year old male with a significant past medical history for HTN, remote history of tobacco/EtOH abuse and, diverticulitis who presented to [facility] ED after developing L hand glove-like tingling sensation on Sunday that resolved after 1 hour but came back on Monday morning and developed sob while reading a book to his son around 11AM with R sided back stabbing pain. Symptoms didn't resolve and patient presented to the ED around 1PM.    On admission, patient was found to be hypertensive 184/93, bradycardic 53, EKG unremarkable, troponin, BNP WNL, and CXR unremarkable. Neurology was consulted and obtained MRI which was unremarkable other than showing a 1.6 mm saccular aneurysm (R MCA bifurcation). Stress echocardiogram was ordered due to \"chest pains\" and showed a positive hypertensive response to exercise with 1 mm ST depression in inferior and lateral leads which normalized within 2 minutes into recovery. Theres was no WMA and EF was 55-60% at rest an with exercise.     At the time of interview, the patient denies chest pain this am, dyspnea occurs intermittently, no orthopnea, no palpitations, no lightheadedness, he continues to have L handed tingling sensation.     Review of Systems:    Complete review of systems was performed and negative except per HPI.    PMH:  Past Medical History:   Diagnosis Date     Diverticulosis      Seasonal allergies      Substance dependence, in remission (H) 1993     Active Problems:  Patient Active Problem List    Diagnosis Date Noted     Diverticulitis of colon 10/22/2014     Priority: Medium     Diverticulitis of colon with perforation 05/30/2014     Priority: Medium     Social " History:  Social History   Substance Use Topics     Smoking status: Former Smoker     Quit date: 1/1/1998     Smokeless tobacco: Never Used     Alcohol use No     Family History:  Family History   Problem Relation Age of Onset     Cancer Mother      Cancer - colorectal Mother      Hypertension Mother      Crohn Disease Mother      Cancer Father      Diabetes Maternal Grandmother      C.A.D. Maternal Grandmother      LUISALORENA. Paternal Grandfather      Hypertension Brother        Medications:    aspirin  81 mg Oral Daily     lisinopril  5 mg Oral Daily     sodium chloride (PF)  3 mL Intracatheter Q8H           Physical Exam:  Temp:  [97.7  F (36.5  C)-97.8  F (36.6  C)] 97.8  F (36.6  C)  Pulse:  [42] 42  Heart Rate:  [44-47] 47  Resp:  [16] 16  BP: (135-136)/(81-85) 135/81  SpO2:  [95 %-99 %] 95 %    Intake/Output Summary (Last 24 hours) at 09/05/18 0809  Last data filed at 09/05/18 0500   Gross per 24 hour   Intake              240 ml   Output                0 ml   Net              240 ml     GEN: Pleasant, no acute distress  HEENT: no icterus.  No evidence of cranial trauma.  CV: RRR, normal s1/s2, no murmurs/rubs/s3/s4, no heave. No JVD.   CHEST: clear to ausculation bilaterally, no rales or wheezing  ABD: soft, non-tender, normal active bowel sounds  EXTR: pulses present throughout. No clubbing, cyanosis. There is no pitting edema.  NEURO: alert oriented, speech fluent/appropriate, motor grossly nonfocal    Diagnostics:  All labs and imaging were reviewed, of note:      Recent Labs  Lab 09/03/18  1341   WBC 6.6   HGB 15.2   *       Recent Labs  Lab 09/03/18  1341      POTASSIUM 4.0   CHLORIDE 106   CO2 27   BUN 15   CR 0.86   GLC 93   MARIO 8.8                                                                              :    Recent Labs  Lab 09/03/18  1341   NTBNPI 61       Lab Results   Component Value Date    TROPI <0.015 09/03/2018    TROPI <0.015 09/03/2018    TROPI <0.015 09/03/2018    TROPI <0.015  2018    TROPONIN 0.01 2018       [id number]      Echo stress test: 9/3/18  Hypertensive blood pressure response to exercise.  No angina symptoms with exercise.  1 mm ST segment depression in the inferior and lateral leads that normalize to  baseline 2 minutes into recovery.  Normal segmental and global LV function with EF of approximately 55-60% at  rest; with exercise left ventricular ventricular cavity size decreases and  LVEF increases to 65-70%. No stress induced regional wall motion  abnormalities.  Average functional capacity for age.  Normal aortic root and no significant valvular dysfunction noted on screening  2D and Doppler examination.    Assessment and Recommendation:  Assessment & Plan   Lui Arrington is a 48 year old male with PMH significant for  HTN, remote history of tobacco/EtOH abuse and, diverticulitis who presented to [facility] ED with L hand tingling, R sided stabbing back pain and shortness of breath. Cardiology consulted after positive stress echo.     1. Positive stress test   Patient with non-anginal symptoms since , and now positive EKG portion of echo stress (1 mm ST depression in inferior and lateral leads) but no WMA present. This stress test was performed on a patient who was hypertensive and with active chest pain which is a contraindication for a stress test. Target heart rate was not met, and stress test was stopped due to fatigue and hypertensive response, making this stress test difficult truly interpret. Patient with risk factors including, HTN, remote history of tobacco abuse and elevated BMI. Family history of heart disease includes grandparents with MI's >65 y.o but no 1st degree relatives. ASCVD score is 3.3%, hence patient is not on a statin. HARVINDER risk score on admission was 0.   Plan:  -Coronary CTa (done)  -TTE   -Continue Lisinopril 5 mg every day   -Increase Amlodipine to 5 mg every day at bedtime   -Start Atorvastatin 40 mg every day  -AM labs: ApoA1    -Will f/u with Dr. Rosario as outpatient at the end of the month.      Addendum 1817:   CTa 18  1.  High plaque burden with serial stenosis in the proximal to mid LAD  that are moderate-severe in severity. Recommend further evaluation  with invasive angiogram. Results conveyed to team. Minimal disease in  the RCA and circumflex.  2.  Total Agatston score 189 placing the patient in the 95th  percentile when compared to age and gender matched control group.    I discussed results CTA wit patient and staff    IMPRESSION:  1.  High plaque burden with serial stenosis in the proximal to mid LAD  that are moderate-severe in severity. Recommend further evaluation  with invasive angiogram. Results conveyed to team. Minimal disease in  the RCA and circumflex.  2.  Total Agatston score 189 placing the patient in the 95th  percentile when compared to age and gender matched control group.  3.  Please review Radiology report for incidental noncardiac findings  that will follow separately.     FINDINGS:     CORONARY CALCIUM SCORE     Total Agatston calcium score: 189   Left main: 3  Left anterior descendin  Left circumflex: 0  Right coronary artery: 16   This places the patient in the 95th percentile when compared to age  and gender matched control group.     CORONARY ANGIOGRAPHY     DOMINANCE: Right dominant system.   Normal coronary origins and course.     LEFT MAIN:   The left main arises normally from the left coronary cusp and has a  minimal (<25%) distal stenosis composed of noncalcified plaque.     LEFT ANTERIOR DESCENDING:   Proximal LAD: Moderate (50-69%) stenosis composed of mixed plaque.  Mid LAD: Moderate-severe (50-69%) stenosis composed of noncalcified  plaque.  Second diagonal: Mild (25-49%) stenosis composed of noncalcified  plaque.  The remainder of the left anterior descending and its major diagonal  branches are patent with minimal luminal irregularities.     LEFT CIRCUMFLEX:   The left circumflex and  its major marginal branches are patent with  minimal luminal irregularities     RIGHT CORONARY ARTERY:   Mid RCA: Minimal (<25%) stenosis composed of mixed plaque.  The remainder of the right coronary and the posterior descending  artery are patent with minimal luminal irregularities.     ADDITIONAL FINDINGS:      The proximal ascending aorta is normal in size.   Normal pulmonary venous anatomy with all four pulmonary veins draining  into the left atrium.    There is no left ventricular mass or thrombus.   Normal pericardial thickness. There is no pericardial effusion    No lymphadenopathy demonstrated. Heart size is normal. Thoracic aortic  root is normal in size. Mild calcified and noncalcified  atherosclerotic plaque of the LAD with mild stenosis. RCA and left  circumflex artery are patent. Normal conus branch. Normal ASD branch.  Acute marginals and obtuse marginals are patent. Dominant right  coronary artery anatomy.     Main pulmonary artery is normal in size. No pericardial effusion.  Thoracic aorta and main pulmonary artery are normal in size.  Visualized portions of the esophagus are normal. The visualized lungs  are otherwise clear. Imaged portions of the central airways are clear.            TTE 9/5/18  Global and regional left ventricular function is normal with an EF of 55-60%.  Global right ventricular function is normal.  The inferior vena cava was normal in size with preserved respiratory  variability.  No pericardial effusion is present.      I have discussed the above with Dr. Rosario.    Thank you for consulting the cardiovascular services at the [facility]. Please do not hesitate to call us with any questions.    Start atorvastatin 40 mg/d - his LDL-chol 08-24-28 was 78 mg/dl  Start lisinopril 5 mg in the morning    Start 2.5 mg amlodipin at bedtime  Start aspirin 81 mg/d    Follow-up outpatient cardiology with Dr Rosario - end September        Pretty Ennis,  MD  Internal Medicine, PGY-3    I interviewed and examined the patient with the house staff.  I agree with the assessment and plan as documented.    Malik Rosario MD, PhD  Professor of Medicine  Division of Cardiology

## 2018-09-05 NOTE — PLAN OF CARE
Problem: Patient Care Overview  Goal: Plan of Care/Patient Progress Review  Outcome: No Change  Assumed care of pt at 1900 on 9/4/18. VSS ex lindsey. Pt currently denies c/o pain but has intermittent headaches relieved by PRN tylenol. Pt denies c/o chest pain but would like to speak with cardiologist so consult placed. Pt tolerating regular diet; denies N/V. PIV SL. Pt up ad josie. PLAN: continue POC, cardio consult.     Pt ok with bedside report if awake.

## 2018-09-05 NOTE — PROGRESS NOTES
Bedside Lumbar puncture incomplete. Pt to have Lumbar puncture with IR tomorrow. NPO effective midnight.

## 2018-09-06 ENCOUNTER — APPOINTMENT (OUTPATIENT)
Dept: GENERAL RADIOLOGY | Facility: CLINIC | Age: 48
DRG: 093 | End: 2018-09-06
Payer: COMMERCIAL

## 2018-09-06 VITALS
RESPIRATION RATE: 10 BRPM | OXYGEN SATURATION: 97 % | HEART RATE: 42 BPM | SYSTOLIC BLOOD PRESSURE: 131 MMHG | DIASTOLIC BLOOD PRESSURE: 73 MMHG | TEMPERATURE: 97.5 F

## 2018-09-06 LAB
APPEARANCE CSF: CLEAR
COLOR CSF: COLORLESS
GLUCOSE CSF-MCNC: 52 MG/DL (ref 40–70)
GRAM STN SPEC: NORMAL
GRAM STN SPEC: NORMAL
PROT CSF-MCNC: 51 MG/DL (ref 15–60)
RBC # CSF MANUAL: 17 /UL (ref 0–2)
SPECIMEN SOURCE: NORMAL
TUBE # CSF: 4 #
WBC # CSF MANUAL: 3 /UL (ref 0–5)

## 2018-09-06 PROCEDURE — 62270 DX LMBR SPI PNXR: CPT

## 2018-09-06 PROCEDURE — 89050 BODY FLUID CELL COUNT: CPT | Performed by: PSYCHIATRY & NEUROLOGY

## 2018-09-06 PROCEDURE — 25000132 ZZH RX MED GY IP 250 OP 250 PS 637: Performed by: NURSE PRACTITIONER

## 2018-09-06 PROCEDURE — 25000132 ZZH RX MED GY IP 250 OP 250 PS 637: Performed by: INTERNAL MEDICINE

## 2018-09-06 PROCEDURE — 84157 ASSAY OF PROTEIN OTHER: CPT | Performed by: PSYCHIATRY & NEUROLOGY

## 2018-09-06 PROCEDURE — 87070 CULTURE OTHR SPECIMN AEROBIC: CPT | Performed by: PSYCHIATRY & NEUROLOGY

## 2018-09-06 PROCEDURE — 25000128 H RX IP 250 OP 636: Performed by: PSYCHIATRY & NEUROLOGY

## 2018-09-06 PROCEDURE — 25000125 ZZHC RX 250: Performed by: RADIOLOGY

## 2018-09-06 PROCEDURE — 82945 GLUCOSE OTHER FLUID: CPT | Performed by: PSYCHIATRY & NEUROLOGY

## 2018-09-06 PROCEDURE — 87205 SMEAR GRAM STAIN: CPT | Performed by: PSYCHIATRY & NEUROLOGY

## 2018-09-06 RX ORDER — ACETAMINOPHEN 325 MG/1
650 TABLET ORAL EVERY 4 HOURS PRN
Status: CANCELLED | OUTPATIENT
Start: 2018-09-06

## 2018-09-06 RX ORDER — LIDOCAINE HYDROCHLORIDE 10 MG/ML
30 INJECTION, SOLUTION EPIDURAL; INFILTRATION; INTRACAUDAL; PERINEURAL ONCE
Status: COMPLETED | OUTPATIENT
Start: 2018-09-06 | End: 2018-09-06

## 2018-09-06 RX ORDER — IBUPROFEN 200 MG
200-400 TABLET ORAL EVERY 6 HOURS PRN
Status: CANCELLED | OUTPATIENT
Start: 2018-09-06

## 2018-09-06 RX ORDER — AMLODIPINE BESYLATE 2.5 MG/1
2.5 TABLET ORAL DAILY
Qty: 90 TABLET | Refills: 1 | Status: SHIPPED | OUTPATIENT
Start: 2018-09-07 | End: 2019-03-12

## 2018-09-06 RX ORDER — LORAZEPAM 2 MG/ML
.5-1 INJECTION INTRAMUSCULAR EVERY 4 HOURS PRN
Status: DISCONTINUED | OUTPATIENT
Start: 2018-09-06 | End: 2018-09-06 | Stop reason: HOSPADM

## 2018-09-06 RX ORDER — ATORVASTATIN CALCIUM 40 MG/1
40 TABLET, FILM COATED ORAL EVERY EVENING
Qty: 30 TABLET | Refills: 1 | Status: SHIPPED | OUTPATIENT
Start: 2018-09-06 | End: 2018-09-06

## 2018-09-06 RX ORDER — NITROGLYCERIN 0.4 MG/1
TABLET SUBLINGUAL
Qty: 25 TABLET | Refills: 1 | Status: SHIPPED | OUTPATIENT
Start: 2018-09-06 | End: 2018-09-06

## 2018-09-06 RX ORDER — ATORVASTATIN CALCIUM 40 MG/1
40 TABLET, FILM COATED ORAL EVERY EVENING
Qty: 90 TABLET | Refills: 3 | Status: SHIPPED | OUTPATIENT
Start: 2018-09-06 | End: 2019-08-30

## 2018-09-06 RX ORDER — NITROGLYCERIN 0.4 MG/1
TABLET SUBLINGUAL
Qty: 90 TABLET | Refills: 1 | Status: SHIPPED | OUTPATIENT
Start: 2018-09-06 | End: 2019-12-10

## 2018-09-06 RX ORDER — AMLODIPINE BESYLATE 2.5 MG/1
2.5 TABLET ORAL DAILY
Qty: 30 TABLET | Refills: 1 | Status: SHIPPED | OUTPATIENT
Start: 2018-09-07 | End: 2018-09-06

## 2018-09-06 RX ADMIN — ACETAMINOPHEN 650 MG: 325 TABLET, FILM COATED ORAL at 04:23

## 2018-09-06 RX ADMIN — LORAZEPAM 1 MG: 2 INJECTION INTRAMUSCULAR; INTRAVENOUS at 08:23

## 2018-09-06 RX ADMIN — ASPIRIN 81 MG: 81 TABLET, COATED ORAL at 08:11

## 2018-09-06 RX ADMIN — AMLODIPINE BESYLATE 2.5 MG: 2.5 TABLET ORAL at 08:10

## 2018-09-06 RX ADMIN — LIDOCAINE HYDROCHLORIDE 15 ML: 10 INJECTION, SOLUTION EPIDURAL; INFILTRATION; INTRACAUDAL; PERINEURAL at 10:03

## 2018-09-06 ASSESSMENT — VISUAL ACUITY
OU: NORMAL ACUITY;GLASSES

## 2018-09-06 ASSESSMENT — ACTIVITIES OF DAILY LIVING (ADL)
ADLS_ACUITY_SCORE: 9

## 2018-09-06 NOTE — PROGRESS NOTES
[facility]  Neurology Progress Note  09/06/18    Patient Name: Lui Arrington    Interval events:  Patient reports increased anxiety this morning, primarily surrounding attempted LP yesterday and IR LP scheduled for this AM--requesting medication to help him through the procedure. Continues to notice tingling and numbness in both hands, L>R, extending to slightly proximal to wrist on the L and primarily in his fingers on the right, as well as hand and finger weakness, L>R, that does not impair his typing ability. Patient additionally reports 2-3/10 pain in his right palm, and notes a frontal headache, occurring intermittently, but unchanged from yesterday (present prior to yesterday's LP attempt) and controlled with acetominophen. He also notes that he had a few episodes of anxiety with shortness of breath late last night and early this morning, which resolved with concentration and sitting upright. Did also have an episode of crying this morning, which he says is abnormal for him, but attributes to stress. He denies any paresthesias or weakness in his feet, dizziness, chest pain, back pain, nausea, or changes in bowel or bladder.      Objective:  B/P: 161/96[RN will administer scheduled AM BP meds], T: 97.8, P: 42, R: 15    GENERAL: NAD, sitting up.  HEENT: NC/AT. Mucous membranes moist.  CARDIAC: bradycardic with regular rhythm   RESPIRATORY: Good effort. CTAB. No w/r/r appreciated.  EXTREMITIES: Warm, dry, no LE edema     NEUROLOGIC:  MS: fully alert, attentive, and oriented. Speech clear and fluent  CN:    II - visual fields intact  III, IV, VI - EOMI, PERRL, no nystagmus noted  V - facial sensation intact and equal   VII - facial movement symmetrical  VIII - hearing intact to conversation  IX, X - uvula midline, palate elevation symmetric  XII - tongue midline with full ROM  MOTOR: normal tone throughout, no abnormal movements, strength 4+/5 finger extension, flexion, abduction,  and thumb opposition on the left, otherwise 5/5 throughout  SENSORY: diminished sensation to light touch over fingers and hands bilaterally, extending to wrists, L>R. Able to distinguish dull vs sharp throughout upper and lower extremities, but reports decreased sensation to both in hands and wrists, L>R.  Sensation intact and symmetric to light touch throughout lower extremities  REFLEXES: upper extremity reflexes difficult to elicit, 1+achilles and patellar bilaterally  COORDINATION: FNF normal without dysmetria  GAIT: normal casual gait    Imaging  MRI brain with MRA head and neck: 1. No acute infarct. 2. Head MRA demonstrates a 1.6 mm saccular aneurysm arising from the right MCA bifurcation. No stenosis of the major intracranial arteries. 3. Neck MRA demonstrates patent major cervical arteries without Stenosis.     MRI cervical spine with and without contrast:1. Mild cervical spondylosis, most pronounced at C5-6. 2. No cord signal abnormality.     CT coronary angiogram:  High plaque burden with serial stenosis in the proximal to mid LAD that are moderate-severe in severity. Recommend further evaluation  with invasive angiogram. Results conveyed to team. Minimal disease in the RCA and circumflex.    Echo stress test:  Interpretation Summary  Near maximal stress test, achieved 81% of the age predicted maximal heart  rate. Limiting symptom fatigue.   Hypertensive blood pressure response to exercise.  No angina symptoms with exercise.  1 mm ST segment depression in the inferior and lateral leads that normalize to  baseline 2 minutes into recovery.  Normal segmental and global LV function with EF of approximately 55-60% at  rest; with exercise left ventricular ventricular cavity size decreases and  LVEF increases to 65-70%. No stress induced regional wall motion  abnormalities.  Average functional capacity for age.  Normal aortic root and no significant valvular dysfunction noted on screening  2D and Doppler  examination.     TTE:   Interpretation Summary  Global and regional left ventricular function is normal with an EF of 55-60%.  Global right ventricular function is normal.  The inferior vena cava was normal in size with preserved respiratory  variability.  No pericardial effusion is present.    Lyme antibody: negative    CSF analysis: pending    Assessment & Plan:  Lui Arrington is a 48 year old male who presents with left hand paresthesias, shortness of breath, and chest pain.    #Bilateral hand numbness and paresthesias  #Left hand weakness  Numbness, paresthesias, and weakness continue to worsen, although have not progressed further proximally from yesterday. Given resolution of chest pain, this is not likely related to cardiopulmonary process.  GBS remains in the differential, and LP was attempted yesterday. IR completed successful LP this morning, and CSF analysis is pending.     Other Medical Problems:  #1.6mm right MCA bifurcation saccular aneurysm: Incidental finding found on MRI.  Follow-up with neurology as an outpatient for repeat imaging in 6 months.     #Chest pain: Resolved  #Shortness of breath: manage anxiety, prn Ativan if worsens  #CAD: The patient had a positive stress test with high plaque burden noted on coronary CTA and proximal to mid LAD.  -Cardiology on board, patient will follow-up outpatient with Dr. Rosario  -Start atorvastatin 40 mg daily  -Continue ASA 81 mg daily     #Hypertension: Continue PTA lisinopril and start amlodipine    Activity: up with assistance  Diet: Regular  DVT ppx: SCDs  CODE STATUS: Full  Disposition: pending CSF results    Patient was seen and discussed with attending doctor, Dr. Issa.    The note was scribed by DENNIS Vasquez. Seen and discussed with Dr. Issa.     Miguel Jones MD  Neurology PGY2  Pager: [phone]

## 2018-09-06 NOTE — PLAN OF CARE
Problem: Patient Care Overview  Goal: Plan of Care/Patient Progress Review  Outcome: No Change  Pt. admitted for bilateral N/T in hands, L>R. VS include HTN within parameters and bradycardia (43-59), MD Steven aware. On CCM. Denies chest pain. Reported 2 episodes of SOB this shift; RR and continuous pulse ox WNL. Pt. stated SOB actually improves with movement and reported that feelings of anxiety regarding hospitalization may be contributing to symptoms. A&Ox4. Neuros include: Bilateral hand N/T that is worse on L. side, pain in middle R. hand. NPO. Voids spontaneously without difficulty. No BM this shift. Up independently, steady on feet. PIV, SL. Mild HA pain managed with PRN Tylenol. Pt wants ativan before IR procedure for anxiety, MD Steven aware.

## 2018-09-06 NOTE — PLAN OF CARE
Problem: Patient Care Overview  Goal: Plan of Care/Patient Progress Review  2221: MD Steven paged regarding pt's sudden SOB, HTN, HR 50s. Other VSS.   2232: MD Steven paged again regarding sudden SOB, HTN, HR 50s. Other VSS.     MD arrived approx 2243, stated no meds will be given at this time for BP. SOB has resolved on own.

## 2018-09-06 NOTE — PROGRESS NOTES
"  Care Coordinator Progress Note    Admission Date/Time:  9/3/2018  Attending MD:  Dr Guillermina Issa    Data  Transferred from 6D to 6A last evening. Chart reviewed, discussed with interdisciplinary team. In 6A Discharge Rounds it was reported pt was admitted with SOB, paresthesias of left hand; chest pressure. Cardiology consulted. On telemetry. NPO after midnight for lumbar puncture today.     Patient was admitted for:  Shortness of breath; left hand paresthesias; right-sided back pain with deep breath.    Evaluation for acute coronary syndrome was negative.  Past history includes:  Hypertension; diverticulosis.     Cardiology Consult note 9-05-18:  \"On admission, patient was found to be hypertensive 184/93, bradycardic 53, EKG unremarkable, troponin, BNP WNL, and CXR unremarkable. Neurology was consulted and obtained MRI which was unremarkable other than showing a 1.6 mm saccular aneurysm (R MCA bifurcation). Stress echocardiogram was ordered due to \"chest pains\" and showed a positive hypertensive response to exercise with 1 mm ST depression in inferior and lateral leads which normalized within 2 minutes into recovery. Theres was no WMA and EF was 55-60% at rest an with exercise.\"  _____________________________________________________________    Concerns with insurance coverage for discharge needs: None. Pt's insurance is [employer].  Current Living Situation: Patient lives with spouse.  Support System: Supportive  Services Involved: Dialysis Services  Transportation at Discharge: Family or friend will provide  Transportation to Medical Appointments:  Family  Barriers to Discharge: None    Coordination of Care and Referrals  Chart reviewed. Discussed in 6A Discharge Rounds.   Introduced myself to pt and explained I help with discharge planning. Pt resting in bed, alert and pleasant. Pt said he had LP earlier today. Wants to know LP results, diagnosis and treatment plan before he discharges. Informed pt I would page the " doctor and inform him. Pt would like to go home today.  Discussed he is going home on new BP meds. Suggested he may need to check his BP at home. Pt said he has already talked with his primary about it. Instructed him to change positions slowly.   Pt said he has an appt with his primary tomorrow.   No other concerns at this time.     Text paged Dr Jones and informed him pt wants to know LP results, diagnosis & treatment plan. Wants to go home.     Assessment  Pt wanting to go home, but wants to know LP results, diagnosis and treatment plan before discharge.       Plan  Anticipated Discharge Date:  Today, pending test results  Anticipated Discharge Plan:   Discharge home.   --Appointment scheduled to see his primary, Dr Dorsey, tomorrow.    --Pt to follow-up with Cardiology the end of September.         Julieth Harris RN Care Coordinator  Unit [address], [facility]

## 2018-09-06 NOTE — DISCHARGE SUMMARY
[facility], [place]    Neurology Discharge Summary    Date of Admission: 9/3/2018  Date of Discharge: September 6, 2018    Disposition: Discharged to home  Primary Care Physician: Richy Dorsey     Admission Diagnosis:   Both hands numbness and tingling     Discharge Diagnosis:   Bilateral hand numbness and tingling of undetermined etiology     Problem Leading to Hospitalization (from HPI):    48 year old male who presents with left hand paresthesias, shortness of breath, and chest pain.    Please see H&P dated for further details about presentation.    Brief Hospital Course:   The pt pw Numbness, paresthesias, and weakness that slowly continued to worsen then stabilized, although have not progressed further proximally from yesterday. Given resolution of chest pain, this is not likely related to cardiopulmonary process.  GBS was in our ddx. However the CSF analysis did not suggest such diagnosis with cell count 3 and protein 51. MRA admission showed incidental finding of 1.6mm right MCA bifurcation saccular aneurysm.     #Bilateral hand numbness and paresthesias  #Left hand weakness  Numbness, paresthesias, and weakness continued to slowly worsen despite resolution of chest pain and shortness of breath.  Unlikely related to cardiopulmonary process at this time and unlikely to be GBS.    -Neurology follow up in 2 months      #1.6mm right MCA bifurcation saccular aneurysm: Incidental finding found on MRI.  Follow-up with neurology as an outpatient for repeat imaging in 6 months.     #Chest pain: Resolved  #Shortness of breath: Resolved  #CAD: The patient had a positive stress test with high plaque burden noted on coronary CTA and proximal to mid LAD.  -Follow up with Dr. Rosario   -Start atorvastatin 40 mg daily  -Continue ASA 81 mg daily.       PERTINENT INVESTIGATIONS    Labs  Lab Results   Component Value Date    WBC 6.6 09/03/2018     Lab Results   Component Value Date    RBC 5.38  09/03/2018     Lab Results   Component Value Date    HGB 15.2 09/03/2018     Lab Results   Component Value Date    HCT 45.6 09/03/2018     No components found for: MCT  Lab Results   Component Value Date    MCV 85 09/03/2018     Lab Results   Component Value Date    MCH 28.3 09/03/2018     Lab Results   Component Value Date    MCHC 33.3 09/03/2018     Lab Results   Component Value Date    RDW 13.7 09/03/2018     Lab Results   Component Value Date     09/03/2018     Last Basic Metabolic Panel:  Lab Results   Component Value Date     09/03/2018      Lab Results   Component Value Date    POTASSIUM 4.0 09/03/2018     Lab Results   Component Value Date    CHLORIDE 106 09/03/2018     Lab Results   Component Value Date    MARIO 8.8 09/03/2018     Lab Results   Component Value Date    CO2 27 09/03/2018     Lab Results   Component Value Date    BUN 15 09/03/2018     Lab Results   Component Value Date    CR 0.86 09/03/2018     Lab Results   Component Value Date    GLC 93 09/03/2018         Imaging:   CTA coronary  IMPRESSION:  1.  High plaque burden with serial stenosis in the proximal to mid LAD  that are moderate-severe in severity. Recommend further evaluation  with invasive angiogram. Results conveyed to team. Minimal disease in  the RCA and circumflex.  2.  Total Agatston score 189 placing the patient in the 95th  percentile when compared to age and gender matched control group.  3.  Please review Radiology report for incidental noncardiac findings  that will follow separately.    MRI Cervical spine  Impression:   1. Mild cervical spondylosis, most pronounced at C5-6.  2. No cord signal abnormality.    MRI brain   Impression:  1. No acute infarct.  2. Head MRA demonstrates a 1.6 mm saccular aneurysm arising from the  right MCA bifurcation. No stenosis of the major intracranial arteries.  3. Neck MRA demonstrates patent major cervical arteries without  stenosis.      PHYSICAL EXAMINATION  GENERAL: NAD, sitting  up.  HEENT: NC/AT. Mucous membranes moist.  CARDIAC: bradycardic with regular rhythm   RESPIRATORY: Good effort. CTAB. No w/r/r appreciated.  EXTREMITIES: Warm, dry, no LE edema      NEUROLOGIC:  MS: fully alert, attentive, and oriented. Speech clear and fluent  CN:    II - visual fields intact  III, IV, VI - EOMI, PERRL, no nystagmus noted  V - facial sensation intact and equal   VII - facial movement symmetrical  VIII - hearing intact to conversation  IX, X - uvula midline, palate elevation symmetric  XII - tongue midline with full ROM  MOTOR: normal tone throughout, no abnormal movements, strength 4+/5 finger extension, flexion, abduction, and thumb opposition on the left, otherwise 5/5 throughout  SENSORY: diminished sensation to light touch over fingers and hands bilaterally, extending to wrists, L>R. Able to distinguish dull vs sharp throughout upper and lower extremities, but reports decreased sensation to both in hands and wrists, L>R.  Sensation intact and symmetric to light touch throughout lower extremities  REFLEXES: upper extremity reflexes difficult to elicit, 1+achilles and patellar bilaterally  COORDINATION: FNF normal without dysmetria  GAIT: normal casual gait    Additional recommendations and follow up:       Review of your medicines      UNREVIEWED medicines. Ask your doctor about these medicines       Dose / Directions    lisinopril 5 MG tablet   Commonly known as:  PRINIVIL/ZESTRIL   Used for:  Benign essential hypertension        Dose:  5 mg   Take 1 tablet (5 mg) by mouth daily   Quantity:  30 tablet   Refills:  1           No discharge procedures on file.    PAtient was seen and discussed with Dr.Benish Miguel Jones MD  PGY1-Neurology   [phone]

## 2018-09-06 NOTE — PROGRESS NOTES
[facility] Procedure Note          Lumbar Puncture under fluoroscopy :      Time: 10:14 AM  Performed by: Jay Jay Celis  Authorized by: Jay Jay Celis    Indications: abnormal neurologic exam    Consent given by: Patient who states understanding of the procedure being performed after discussing the risks, benefits and alternatives.    Prior to the start of the procedure and with procedural staff participation, I verbally confirmed the patient s identity using two indicators, relevant allergies, that the procedure was appropriate and matched the consent or emergent situation, and that the correct equipment/implants were available. Immediately prior to starting the procedure I conducted the Time Out with the procedural staff and re-confirmed the patient s name, procedure, and site/side. (The Joint Commission universal protocol was followed.) Yes    Under sterile conditions the patient was positioned L Lateral decubitus with knees drawn up. Betadine solution and sterile drapes were utilized.  Local anesthetic at the site: 2 ml of lidocaine 1% without epinephrine from the LP tray  A 22 G Pencil point spinal needle was inserted at the L 3-4 interspace.  Opening Pressure 22cm H2O pressure.  A total of 10mL of clear and colorless spinal fluid was obtained and sent to the laboratory.   After the needle was removed, a bandaid and pressure were applied and the patient was instructed to stay horizontal until the results were back.    Complications:  None    Patient tolerance: Patient tolerated the procedure well with no immediate complications.

## 2018-09-06 NOTE — PLAN OF CARE
Problem: Patient Care Overview  Goal: Plan of Care/Patient Progress Review  Status: Pt admitted with bilateral hand numbness/tingling L>R.     VS: VS with HTN (180s/90s), HR 50s. MD Steven aware. CCM with sinus bradycardia (50s)  Neuros: Bilateral hand decreased sensation/tingling L>R, also pain in middle R hand/worsening R hand sensation that began this afternoon.   GI: Reg diet, tolerating well, NPO at midnight for IR LP tomorrow.   : Voiding spont with good UOP.  ?  IV: PIV SL  Activity: Up ind.   Pain: Mild head/neck. Tylenol works well.   Respiratory/Trach: Pt had episode approx 2220 c/o sudden SOB, MD aware. Resolved on own.   Skin: LP site CDI.   Social: Family not present but has wife/kids, supportive of pt.   Plan of care: Cont to monitor. Pt wants ativan before IR procedure for anxiety, MD tSeven aware.

## 2018-09-07 ENCOUNTER — OFFICE VISIT (OUTPATIENT)
Dept: FAMILY MEDICINE | Facility: CLINIC | Age: 48
End: 2018-09-07
Payer: COMMERCIAL

## 2018-09-07 ENCOUNTER — PATIENT OUTREACH (OUTPATIENT)
Dept: CARE COORDINATION | Facility: CLINIC | Age: 48
End: 2018-09-07

## 2018-09-07 VITALS
WEIGHT: 277.8 LBS | TEMPERATURE: 97.4 F | BODY MASS INDEX: 34.72 KG/M2 | HEART RATE: 48 BPM | SYSTOLIC BLOOD PRESSURE: 139 MMHG | OXYGEN SATURATION: 100 % | DIASTOLIC BLOOD PRESSURE: 87 MMHG

## 2018-09-07 DIAGNOSIS — I67.1 CEREBRAL ANEURYSM, NONRUPTURED: ICD-10-CM

## 2018-09-07 DIAGNOSIS — I25.10 CORONARY ARTERY DISEASE INVOLVING NATIVE CORONARY ARTERY OF NATIVE HEART WITHOUT ANGINA PECTORIS: Primary | ICD-10-CM

## 2018-09-07 DIAGNOSIS — I10 BENIGN ESSENTIAL HYPERTENSION: ICD-10-CM

## 2018-09-07 LAB — APO A-I SERPL-MCNC: 138 MG/DL (ref 94–178)

## 2018-09-07 ASSESSMENT — ENCOUNTER SYMPTOMS
MYALGIAS: 0
VOMITING: 0
SHORTNESS OF BREATH: 0
DIARRHEA: 0
FATIGUE: 0
NUMBNESS: 0
DIFFICULTY URINATING: 0
SLEEP DISTURBANCE: 0
DYSURIA: 0
CONSTIPATION: 0
ARTHRALGIAS: 0
BLOOD IN STOOL: 0
POLYDIPSIA: 0
ABDOMINAL DISTENTION: 0
COUGH: 0
ABDOMINAL PAIN: 0
COLOR CHANGE: 0
NERVOUS/ANXIOUS: 0
EYES NEGATIVE: 1
DYSPHORIC MOOD: 0
CHEST TIGHTNESS: 0
FEVER: 0
PALPITATIONS: 0

## 2018-09-07 NOTE — PATIENT INSTRUCTIONS
Here is the plan from today's visit    1. Benign essential hypertension  Monitor BP goal <135/85  Drink more water    2. Coronary artery disease involving native coronary artery of native heart without angina pectoris  Exercise 5 days a week, 30 min vigorous   Weight watchers    3. Follow   Please call or return to clinic if your symptoms don't go away.    Follow up plan  Please make a clinic appointment for follow up with me (RICHY DORSEY) in 2-3  months for recheck.    Thank you for coming to [facility] today.  Lab Testing:  **If you had lab testing today and your results are reassuring or normal they will be mailed to you or sent through [employer] within 7 days.   **If the lab tests need quick action we will call you with the results.  The phone number we will call with results is # [phone] (home) [phone] (work). If this is not the best number please call our clinic and change the number.  Medication Refills:  If you need any refills please call your pharmacy and they will contact us.   If you need to  your refill at a new pharmacy, please contact the new pharmacy directly. The new pharmacy will help you get your medications transferred faster.   Scheduling:  If you have any concerns about today's visit or wish to schedule another appointment please call our office during normal business hours [phone] (8-5:00 M-F)  If a referral was made to a [facility] and you don't get a call from central scheduling please call [phone].  If a Mammogram was ordered for you at [facility] call [phone] to schedule or change your appointment.  If you had an XRay/CT/Ultrasound/MRI ordered the number is [phone] to schedule or change your radiology appointment.   Medical Concerns:  If you have urgent medical concerns please call [phone] at any time of the day.    Richy Dorsey MD

## 2018-09-07 NOTE — MR AVS SNAPSHOT
After Visit Summary   9/7/2018    Lui Arrington    MRN: [id number]           Patient Information     Date Of Birth          1970        Visit Information        Provider Department      9/7/2018 9:20 AM Richy Dorsey MD [facility]        Today's Diagnoses     Coronary artery disease involving native coronary artery of native heart without angina pectoris    -  1    Benign essential hypertension        Cerebral aneurysm, nonruptured          Care Instructions    Here is the plan from today's visit    1. Benign essential hypertension  Monitor BP goal <135/85  Drink more water    2. Coronary artery disease involving native coronary artery of native heart without angina pectoris  Exercise 5 days a week, 30 min vigorous   Weight watchers    3. Follow   Please call or return to clinic if your symptoms don't go away.    Follow up plan  Please make a clinic appointment for follow up with me (RICHY DORSEY) in 2-3  months for recheck.    Thank you for coming to [facility] today.  Lab Testing:  **If you had lab testing today and your results are reassuring or normal they will be mailed to you or sent through [employer] within 7 days.   **If the lab tests need quick action we will call you with the results.  The phone number we will call with results is # [phone] (home) [phone] (work). If this is not the best number please call our clinic and change the number.  Medication Refills:  If you need any refills please call your pharmacy and they will contact us.   If you need to  your refill at a new pharmacy, please contact the new pharmacy directly. The new pharmacy will help you get your medications transferred faster.   Scheduling:  If you have any concerns about today's visit or wish to schedule another appointment please call our office during normal business hours [phone] (8-5:00 M-F)  If a referral was made to a [facility] and  you don't get a call from central scheduling please call [phone].  If a Mammogram was ordered for you at [facility] call [phone] to schedule or change your appointment.  If you had an XRay/CT/Ultrasound/MRI ordered the number is [phone] to schedule or change your radiology appointment.   Medical Concerns:  If you have urgent medical concerns please call [phone] at any time of the day.    Richy Dorsey MD            Follow-ups after your visit        Your next 10 appointments already scheduled     Sep 18, 2018  8:30 AM CDT   (Arrive by 8:15 AM)   Return Visit with Guillermina Issa MD   [facility] ([facility] [place])    [address] [place] [id number] [phone]              Who to contact     Please call your clinic at [phone] to:    Ask questions about your health    Make or cancel appointments    Discuss your medicines    Learn about your test results    Speak to your doctor            Additional Information About Your Visit        [employer] Information     [employer] gives you secure access to your electronic health record. If you see a primary care provider, you can also send messages to your care team and make appointments. If you have questions, please call your primary care clinic.  If you do not have a primary care provider, please call [phone] and they will assist you.      [employer] is an electronic gateway that provides easy, online access to your medical records. With [employer], you can request a clinic appointment, read your test results, renew a prescription or communicate with your care team.     To access your existing account, please contact your [facility] or call [phone] for assistance.        Care EveryWhere ID     This is your Care EveryWhere ID. This could be used by other organizations to access your [facility] medical records  [id number]        Your Vitals Were      Pulse Temperature Pulse Oximetry BMI (Body Mass Index)          48 97.4  F (36.3  C) (Oral) 100% 34.72 kg/m2         Blood Pressure from Last 3 Encounters:   09/07/18 139/87   09/06/18 131/73   08/24/18 143/87    Weight from Last 3 Encounters:   09/07/18 277 lb 12.8 oz (126 kg)   08/24/18 281 lb 3.2 oz (127.6 kg)   09/29/17 273 lb 9.6 oz (124.1 kg)              Today, you had the following     No orders found for display       Primary Care Provider Office Phone # Fax #    Richy Dorsey [phone]       [address] [address]        Equal Access to Services     AHSAN STRANGE : Josie floreso Soelmo, waaxda luqadaha, qaybta kaalmada adeegyatrevor, beto colbert. So [facility] [phone].    ATENCIÓN: Si habla español, tiene a cortez disposición servicios gratuitos de asistencia lingüística. Ll[facility] [phone].    We comply with applicable federal civil rights laws and [place] laws. We do not discriminate on the basis of race, color, national origin, age, disability, sex, sexual orientation, or gender identity.            Thank you!     Thank you for choosing hospitals [facility]  for your care. Our goal is always to provide you with excellent care. Hearing back from our patients is one way we can continue to improve our services. Please take a few minutes to complete the written survey that you may receive in the mail after your visit with us. Thank you!             Your Updated Medication List - Protect others around you: Learn how to safely use, store and throw away your medicines at www.disposemymeds.org.          This list is accurate as of 9/7/18 10:16 PM.  Always use your most recent med list.                   Brand Name Dispense Instructions for use Diagnosis    amLODIPine 2.5 MG tablet    NORVASC    90 tablet    Take 1 tablet (2.5 mg) by mouth daily    Essential hypertension       aspirin 81 MG EC tablet     90 tablet     Take 1 tablet (81 mg) by mouth daily    Chest pain, unspecified type       atorvastatin 40 MG tablet    LIPITOR    90 tablet    Take 1 tablet (40 mg) by mouth every evening    Chest pain, unspecified type       lisinopril 5 MG tablet    PRINIVIL/ZESTRIL    30 tablet    Take 1 tablet (5 mg) by mouth daily    Benign essential hypertension       nitroGLYcerin 0.4 MG sublingual tablet    NITROSTAT    90 tablet    For chest pain place 1 tablet under the tongue every 5 minutes for 3 doses. If symptoms persist 5 minutes after 1st dose call 911.    Chest pain, unspecified type

## 2018-09-07 NOTE — PROGRESS NOTES
HPI       Lui Arrington is a 48 year old  who presents for   Chief Complaint   Patient presents with     RECHECK     f/u BP   Coronary artery disease  Patient was on observation and hospitalized on Monday Tuesday for hand numbness and then also some possible chest pain.  He was given a stress test which was all normal except for 1 mm of ST depression that did resolve quickly even during exercise.  Because of this he did have a CTA angiogram this showed that he does have atherosclerotic plaques as high as 50-60% some arteries I believe in the LAD.  He has discussed this with his physician who feels that a invasive stenting would not be indicated at this point he is on aspirin and atorvastatin lisinopril and amlodipine.    Hand numbness  He was also seen by neurology in the hospital because of hand numbness and some weakness this was elevated and he was found she was found not to have any significant disease MRI was done as well as a lumbar puncture.  The patient was reassured but still uncertain because there is no clear diagnosis.  Hypertension Follow-up  <135/85    Outpatient blood pressures are being checked at home.  Results are 140/90.    Chest Pain? :No     Low Salt Diet: not monitoring salt    Daily NSAID Use? YES Aspirin    Did patient take their HTN pills today/last night as usual?  Yes    Last Basic Metabolic Panel:  Lab Results   Component Value Date     09/03/2018      Lab Results   Component Value Date    POTASSIUM 4.0 09/03/2018     Lab Results   Component Value Date    CHLORIDE 106 09/03/2018     Lab Results   Component Value Date    MARIO 8.8 09/03/2018     Lab Results   Component Value Date    CO2 27 09/03/2018     Lab Results   Component Value Date    BUN 15 09/03/2018     Lab Results   Component Value Date    CR 0.86 09/03/2018     Lab Results   Component Value Date    GLC 93 09/03/2018       Adherence and Exercise  Medication side effects: no  How often is a medication missed?  Never  Exercise:High intensity intervals, walking, rock climbing 6-7 days/week for an average of greater than 60 minutes        +++++++      Problem, Medication and Allergy Lists were reviewed and updated if needed..    Patient is an established patient of this clinic..         Review of Systems:   Review of Systems   Constitutional: Negative for fatigue and fever.   HENT: Negative.    Eyes: Negative.  Negative for visual disturbance.   Respiratory: Negative for cough, chest tightness and shortness of breath.    Cardiovascular: Negative for chest pain and palpitations.   Gastrointestinal: Negative for abdominal distention, abdominal pain, blood in stool, constipation, diarrhea and vomiting.   Endocrine: Negative for polydipsia and polyuria.   Genitourinary: Negative for difficulty urinating and dysuria.   Musculoskeletal: Negative for arthralgias and myalgias.   Skin: Negative for color change and rash.   Neurological: Negative for numbness.   Psychiatric/Behavioral: Negative for dysphoric mood and sleep disturbance. The patient is not nervous/anxious.             Physical Exam:     Vitals:    09/07/18 0926 09/07/18 0927   BP: 143/90 139/87   Pulse: (!) 48    Temp: 97.4  F (36.3  C)    TempSrc: Oral    SpO2: 100%    Weight: 277 lb 12.8 oz (126 kg)      Body mass index is 34.72 kg/(m^2).  Vitals were reviewed and were normal     Physical Exam   Constitutional: He is oriented to person, place, and time. He appears well-developed. No distress.   HENT:   Head: Normocephalic.   Eyes: Conjunctivae are normal. No scleral icterus.   Neck: Normal range of motion. No thyromegaly present.   Cardiovascular: Normal rate, regular rhythm and normal heart sounds.    No murmur heard.  Pulmonary/Chest: Effort normal and breath sounds normal. No respiratory distress. He has no wheezes.   Abdominal: Soft. Bowel sounds are normal. He exhibits no distension. There is no splenomegaly or hepatomegaly. There is no tenderness.    Musculoskeletal: He exhibits no edema.   Lymphadenopathy:     He has no cervical adenopathy.   Neurological: He is alert and oriented to person, place, and time.   Skin: Skin is warm and dry. He is not diaphoretic.   Psychiatric: He has a normal mood and affect. His behavior is normal. Judgment and thought content normal.   Vitals reviewed.        Results:      Results from this visit  Results for orders placed or performed during the hospital encounter of 09/03/18   XR Chest 2 Views    Narrative    Exam:  Chest X-ray 9/3/2018 3:15 PM    History: shortness of breath, back pain;     Comparison: Shortness of breath, back pain    Findings: PA and lateral views of the chest. The trachea is midline.  The cardiomediastinal silhouette is within normal limits. Pulmonary  vasculature is distinct. No pleural effusion or pneumothorax. No focal  pulmonary opacities. No acute bony abnormalities. The upper abdomen is  unremarkable.      Impression    Impression:   No acute cardiopulmonary abnormalities.    I have personally reviewed the examination and initial interpretation  and I agree with the findings.    SHARMILA SHEPARD MD   MR Brain for [facility] w/o & w Contras    Narrative    MRI brain without and with contrast  MRA of the head without contrast  Neck MRA without and with contrast    Provided History:  arm paresthesias.    Comparison:  None      Technique:   Brain MRI:  Axial diffusion, FLAIR, T2-weighted, susceptibility, and  coronal T1-weighted images were obtained without intravenous contrast.  Following intravenous gadolinium-based contrast administration, axial  and coronal T1-weighted images were obtained.    Head MRA: 3D time-of-flight MRA of the [place] of [place] was performed  without intravenous contrast.  Neck MRA:  Limited non contrast 2DTOF images were obtained of the  mid-cervical region. Following intravenous gadolinium-based contrast  administration, a contrast enhanced MRA of the neck/cervical  vessels  was performed.  Three-dimensional reconstructions of the neck and head MRA were  created, which were reviewed by the radiologist.    Dose: 10mL Gadavist    Findings:   Brain MRI: No restricted diffusion. There is no intracranial  hemorrhage on susceptibility images. Ventricles are proportionate to  the cerebral sulci. Contrast-enhanced images of the brain demonstrate  no abnormal enhancement. Incidental developmental venous anomaly in  the anteromedial left frontal lobe.    Head MRA demonstrates a 1.6 x 0.8 mm saccular aneurysm arising from  the right MCA bifurcation. No stenosis of the major intracranial  arteries.    Neck MRA demonstrates patent major cervical arteries. The normal  distal right internal carotid artery measures 5 mm. The normal distal  left internal carotid artery measures 4 mm. Antegrade flow in the  major cervical vasculature.      Impression    Impression:  1. No acute infarct.  2. Head MRA demonstrates a 1.6 mm saccular aneurysm arising from the  right MCA bifurcation. No stenosis of the major intracranial arteries.  3. Neck MRA demonstrates patent major cervical arteries without  stenosis.    I have personally reviewed the examination and initial interpretation  and I agree with the findings.    ANDI ESCOBEDO MD   MR Cervical Spine w/o & w Contrast    Narrative    MR CERVICAL SPINE W/O & W CONTRAST 9/4/2018 9:06 PM    Provided History: Left and right hand sensory changes, acute onset.    Comparison: None available    Technique: Sagittal T1-weighted, sagittal T2-weighted, sagittal STIR,  and axial T2-weighted images of the cervical spine were obtained  without intravenous contrast. Following intravenous administration of  gadolinium, axial and sagittal T1-weighted images with fat saturation  were also obtained.    Contrast: 10mL Gadavist    Findings:  The cervical vertebrae are normally aligned.  There is no disc height  narrowing at any level.  No abnormal cord signal.  There is  no  abnormal contrast enhancement within the cervical spinal cord, thecal  sac or vertebral column.  The findings on a level by level basis are  as follows:    C2-3: No spinal canal or neural foraminal narrowing.    C3-4:  No spinal canal or neural foraminal narrowing.    C4-5:  No spinal canal or neural foraminal narrowing.    C5-6:  Left central disc protrusion indents the left ventral cord and  results in mild spinal canal narrowing. Mild bilateral facet  hypertrophy. Mild left neural foraminal narrowing. No right neural  foraminal stenosis.     C6-7:  Mild uncinate and facet hypertrophy. Mild left neural foraminal  narrowing. No right neural foraminal stenosis. No spinal canal  stenosis.    C7-T1:  Bilateral facet hypertrophy. No spinal canal or neural  foraminal stenosis.     No abnormality of the paraspinous soft tissues.      Impression    Impression:   1. Mild cervical spondylosis, most pronounced at C5-6.  2. No cord signal abnormality.    I have personally reviewed the examination and initial interpretation  and I agree with the findings.    ANDI ESCOBEDO MD   CT Angiogram coronary artery    Narrative    Procedure: CTA ANGIOGRAM CORONARY ARTERY   Examination Date: 9/5/2018 1:49 PM   Indication: non anginal symptoms and 1 mm ST depression inf/lat leads  echo stress;    Ordering Provider: Raymon Guzman  Overall quality of the study: Fair due to obesity.     PROCEDURE: ECG gated multi-slice computed tomography of the heart   with and without intravenous contrast  (Isovue 370, 120 mL, wasted 0  mL) was  performed on a Siemens Dual Source Flash scanner without  incident. Beta-blockers were not used to optimize heart rate.  Sublingual Nitrostat 0.4 mg was given prior to scanning. Coronary  artery calcium score was performed using the Flash scanner protocol.  CTA was performed in the spiral mode at a heart rate of 55 bpm with  100 kVp. Images were reconstructed and analyzed on a [employer]  workstation. Scan  protocol was optimized to minimize radiation  exposure. The total radiation exposure including calcium score was  calculated to be 456 DLP, and 6.4 mSv.        Impression    IMPRESSION:  1.  High plaque burden with serial stenosis in the proximal to mid LAD  that are moderate-severe in severity. Recommend further evaluation  with invasive angiogram. Results conveyed to team. Minimal disease in  the RCA and circumflex.  2.  Total Agatston score 189 placing the patient in the 95th  percentile when compared to age and gender matched control group.  3.  Please review Radiology report for incidental noncardiac findings  that will follow separately.    FINDINGS:    CORONARY CALCIUM SCORE    Total Agatston calcium score: 189   Left main: 3  Left anterior descendin  Left circumflex: 0  Right coronary artery: 16   This places the patient in the 95th percentile when compared to age  and gender matched control group.    CORONARY ANGIOGRAPHY    DOMINANCE: Right dominant system.   Normal coronary origins and course.    LEFT MAIN:   The left main arises normally from the left coronary cusp and has a  minimal (<25%) distal stenosis composed of noncalcified plaque.    LEFT ANTERIOR DESCENDING:   Proximal LAD: Moderate (50-69%) stenosis composed of mixed plaque.  Mid LAD: Moderate-severe (50-69%) stenosis composed of noncalcified  plaque.  Second diagonal: Mild (25-49%) stenosis composed of noncalcified  plaque.  The remainder of the left anterior descending and its major diagonal  branches are patent with minimal luminal irregularities.    LEFT CIRCUMFLEX:   The left circumflex and its major marginal branches are patent with  minimal luminal irregularities    RIGHT CORONARY ARTERY:   Mid RCA: Minimal (<25%) stenosis composed of mixed plaque.  The remainder of the right coronary and the posterior descending  artery are patent with minimal luminal irregularities.    ADDITIONAL FINDINGS:     The proximal ascending aorta is  normal in size.   Normal pulmonary venous anatomy with all four pulmonary veins draining  into the left atrium.    There is no left ventricular mass or thrombus.   Normal pericardial thickness. There is no pericardial effusion.  Please review Radiology report for incidental noncardiac findings that  will follow separately.    OPAL DUMONT MD   Radiologist Consult For Cardiology    Narrative    Radiology Consult to Cardiology, 9/5/2018 2:51 PM    History: Chest pain    Comparison: No similar prior studies    Technique: Cardiac CT was performed by cardiology. Interpretation of  the noncardiac portions of the study was requested.  Field of view  extending from approximately the faizan to the upper abdomen.    Findings:  No lymphadenopathy demonstrated. Heart size is normal. Thoracic aortic  root is normal in size. Mild calcified and noncalcified  atherosclerotic plaque of the LAD with mild stenosis. RCA and left  circumflex artery are patent. Normal conus branch. Normal ASD branch.  Acute marginals and obtuse marginals are patent. Dominant right  coronary artery anatomy.    Main pulmonary artery is normal in size. No pericardial effusion.  Thoracic aorta and main pulmonary artery are normal in size.  Visualized portions of the esophagus are normal. The visualized lungs  are otherwise clear. Imaged portions of the central airways are clear.      Limited evaluation of the upper abdomen.    No suspicious osseous abnormality.       Impression    Impression:  1. The visualized lungs are clear.   2. Calcified and noncalcified atherosclerotic plaque of the LAD  causing mild stenosis.  3. Please see cardiology dictation for detailed findings related to  the heart.    I have personally reviewed the examination and initial interpretation  and I agree with the findings.    SARAH GOMEZ MD   XR Lumbar Puncture Spinal Tap Diag    Narrative    Lumbar Puncture using Fluoroscopy    History:  abnormal neurologic exam upper  extremities.    Procedure note: Verbal and written consent for lumbar puncture was  obtained from the patient, and benefits and risk of the procedure were  explained, including but not limited to worsening headache,  hemorrhage, infection, lower extremity pain, or nerve root injury. The  patient was sterilely prepped and draped with the patient in the  decubitus position, over the lower back. Under fluoroscopic guidance,  the interlaminar spaces were noted. 1% lidocaine was administered for  local anesthetic over the L3-4 interlaminar space, and a 22 gauge 5  inch needle was advanced into the thecal sac under fluoroscopic  guidance.  There was initial show of clear CSF. Opening pressure was  22 cm CSF. Approximately 10 cc of CSF were collected.    The needle was removed with the stylet in place. There was no  immediate complication associated with the procedure. Samples were  sent for the requested laboratory testing.      Estimated blood loss: Less than 1 cc.    Fluoroscopic time: 62 seconds      Impression    Impression: Successful lumbar puncture without immediate complication.    I, IRENE GASTON MD, attest that I was present for all critical  portions of the procedure and was immediately available to provide  guidance and assistance during the remainder of the procedure.    I have personally reviewed the examination and initial interpretation  and I agree with the findings.    IRENE GASTON MD   CBC with platelets differential   Result Value Ref Range    WBC 6.6 4.0 - 11.0 10e9/L    RBC Count 5.38 4.4 - 5.9 10e12/L    Hemoglobin 15.2 13.3 - 17.7 g/dL    Hematocrit 45.6 40.0 - 53.0 %    MCV 85 78 - 100 fl    MCH 28.3 26.5 - 33.0 pg    MCHC 33.3 31.5 - 36.5 g/dL    RDW 13.7 10.0 - 15.0 %    Platelet Count 149 (L) 150 - 450 10e9/L    Diff Method Automated Method     % Neutrophils 46.1 %    % Lymphocytes 40.8 %    % Monocytes 9.2 %    % Eosinophils 2.9 %    % Basophils 0.5 %    % Immature Granulocytes  0.5 %    Nucleated RBCs 0 0 /100    Absolute Neutrophil 3.1 1.6 - 8.3 10e9/L    Absolute Lymphocytes 2.7 0.8 - 5.3 10e9/L    Absolute Monocytes 0.6 0.0 - 1.3 10e9/L    Absolute Eosinophils 0.2 0.0 - 0.7 10e9/L    Absolute Basophils 0.0 0.0 - 0.2 10e9/L    Abs Immature Granulocytes 0.0 0 - 0.4 10e9/L    Absolute Nucleated RBC 0.0    D dimer quantitative   Result Value Ref Range    D Dimer 0.5 0.0 - 0.50 ug/ml FEU   INR   Result Value Ref Range    INR 0.92 0.86 - 1.14   Partial thromboplastin time   Result Value Ref Range    PTT 30 22 - 37 sec   Comprehensive metabolic panel   Result Value Ref Range    Sodium 139 133 - 144 mmol/L    Potassium 4.0 3.4 - 5.3 mmol/L    Chloride 106 94 - 109 mmol/L    Carbon Dioxide 27 20 - 32 mmol/L    Anion Gap 6 3 - 14 mmol/L    Glucose 93 70 - 99 mg/dL    Urea Nitrogen 15 7 - 30 mg/dL    Creatinine 0.86 0.66 - 1.25 mg/dL    GFR Estimate >90 >60 mL/min/1.7m2    GFR Estimate If Black >90 >60 mL/min/1.7m2    Calcium 8.8 8.5 - 10.1 mg/dL    Bilirubin Total 0.6 0.2 - 1.3 mg/dL    Albumin 4.0 3.4 - 5.0 g/dL    Protein Total 8.0 6.8 - 8.8 g/dL    Alkaline Phosphatase 64 40 - 150 U/L    ALT 29 0 - 70 U/L    AST 20 0 - 45 U/L   Troponin I   Result Value Ref Range    Troponin I ES <0.015 0.000 - 0.045 ug/L   Nt probnp inpatient   Result Value Ref Range    N-Terminal Pro BNP Inpatient 61 0 - 900 pg/mL   Troponin I   Result Value Ref Range    Troponin I ES <0.015 0.000 - 0.045 ug/L   Troponin I - Now then in 4 hours x 2    Result Value Ref Range    Troponin I ES <0.015 0.000 - 0.045 ug/L   Troponin I - Now then in 4 hours x 2    Result Value Ref Range    Troponin I ES <0.015 0.000 - 0.045 ug/L   Glucose by meter   Result Value Ref Range    Glucose 155 (H) 70 - 99 mg/dL   TSH with free T4 reflex   Result Value Ref Range    TSH 1.52 0.40 - 4.00 mU/L   Lyme Disease Fanta with reflex to WB Serum   Result Value Ref Range    Lyme Disease Antibodies Serum 0.04 0.00 - 0.89   Apolipoprotein A1   Result  "Value Ref Range    Apolipo A-1 138 94 - 178 mg/dL   EKG 12 lead   Result Value Ref Range    Interpretation ECG Click View Image link to view waveform and result    EKG 12-lead, tracing only   Result Value Ref Range    Interpretation ECG Click View Image link to view waveform and result    EKG 12-lead, complete   Result Value Ref Range    Interpretation ECG Click View Image link to view waveform and result    EKG 12-lead, tracing only   Result Value Ref Range    Interpretation ECG Click View Image link to view waveform and result    Cardiology General Adult IP Consult: Patient to be seen: Routine within 24 hrs; Call back #: [id number]; episodes of Bradycardia to HR 49, abnormal stress test, in ED OBS for chest pain.; Consultant may enter orders: Yes    Narrative    Malik Rosario MD     9/5/2018  7:05 PM        [facility]   Cardiology Inpatient Consultation    September 5, 2018    Reason for Consult:  A cardiology consult was requested by Elizabeth Nieves CNP from the   Medicine service to provide clinical guidance regarding positive   stress test.    HPI:   Lui Arrington is a 48 year old male with a significant past   medical history for HTN, remote history of tobacco/EtOH abuse   and, diverticulitis who presented [facility] ED after developing L   hand glove-like tingling sensation on Sunday that resolved after   1 hour but came back on Monday morning and developed sob while   reading a book to his son around 11AM with R sided back stabbing   pain. Symptoms didn't resolve and patient presented to the ED   around 1PM.    On admission, patient was found to be hypertensive 184/93,   bradycardic 53, EKG unremarkable, troponin, BNP WNL, and CXR   unremarkable. Neurology was consulted and obtained MRI which was   unremarkable other than showing a 1.6 mm saccular aneurysm (R MCA   bifurcation). Stress echocardiogram was ordered due to \"chest   pains\" and showed a positive hypertensive response to " exercise   with 1 mm ST depression in inferior and lateral leads which   normalized within 2 minutes into recovery. Theres was no WMA and   EF was 55-60% at rest an with exercise.     At the time of interview, the patient denies chest pain this am,   dyspnea occurs intermittently, no orthopnea, no palpitations, no   lightheadedness, he continues to have L handed tingling   sensation.     Review of Systems:    Complete review of systems was performed and negative except per   HPI.    PMH:  Past Medical History:   Diagnosis Date     Diverticulosis      Seasonal allergies      Substance dependence, in remission (H) 1993     Active Problems:  Patient Active Problem List    Diagnosis Date Noted     Diverticulitis of colon 10/22/2014     Priority: Medium     Diverticulitis of colon with perforation 05/30/2014     Priority: Medium     Social History:  Social History   Substance Use Topics     Smoking status: Former Smoker     Quit date: 1/1/1998     Smokeless tobacco: Never Used     Alcohol use No     Family History:  Family History   Problem Relation Age of Onset     Cancer Mother      Cancer - colorectal Mother      Hypertension Mother      Crohn Disease Mother      Cancer Father      Diabetes Maternal Grandmother      C.A.D. Maternal Grandmother      C.A.D. Paternal Grandfather      Hypertension Brother        Medications:    aspirin  81 mg Oral Daily     lisinopril  5 mg Oral Daily     sodium chloride (PF)  3 mL Intracatheter Q8H           Physical Exam:  Temp:  [97.7  F (36.5  C)-97.8  F (36.6  C)] 97.8  F (36.6  C)  Pulse:  [42] 42  Heart Rate:  [44-47] 47  Resp:  [16] 16  BP: (135-136)/(81-85) 135/81  SpO2:  [95 %-99 %] 95 %    Intake/Output Summary (Last 24 hours) at 09/05/18 0809  Last data filed at 09/05/18 0500   Gross per 24 hour   Intake              240 ml   Output                0 ml   Net              240 ml     GEN: Pleasant, no acute distress  HEENT: no icterus.  No evidence of cranial trauma.  CV: RRR,  normal s1/s2, no murmurs/rubs/s3/s4, no heave. No JVD.   CHEST: clear to ausculation bilaterally, no rales or wheezing  ABD: soft, non-tender, normal active bowel sounds  EXTR: pulses present throughout. No clubbing, cyanosis. There is   no pitting edema.  NEURO: alert oriented, speech fluent/appropriate, motor grossly   nonfocal    Diagnostics:  All labs and imaging were reviewed, of note:      Recent Labs  Lab 18  1341   WBC 6.6   HGB 15.2   *       Recent Labs  Lab 18  1341      POTASSIUM 4.0   CHLORIDE 106   CO2 27   BUN 15   CR 0.86   GLC 93   MARIO 8.8                                                                                :    Recent Labs  Lab 18  1341   NTBNPI 61       Lab Results   Component Value Date    TROPI <0.015 2018    TROPI <0.015 2018    TROPI <0.015 2018    TROPI <0.015 2018    TROPONIN 0.01 2018       [id number]      Echo stress test: 9/3/18  Hypertensive blood pressure response to exercise.  No angina symptoms with exercise.  1 mm ST segment depression in the inferior and lateral leads that   normalize to  baseline 2 minutes into recovery.  Normal segmental and global LV function with EF of approximately   55-60% at  rest; with exercise left ventricular ventricular cavity size   decreases and  LVEF increases to 65-70%. No stress induced regional wall motion  abnormalities.  Average functional capacity for age.  Normal aortic root and no significant valvular dysfunction noted   on screening  2D and Doppler examination.    Assessment and Recommendation:  Assessment & Plan   Lui Arrington is a 48 year old male with PMH significant   for  HTN, remote history of tobacco/EtOH abuse and,   diverticulitis who presented to [facility] ED with L hand tingling, R   sided stabbing back pain and shortness of breath. Cardiology   consulted after positive stress echo.     1. Positive stress test   Patient with non-anginal symptoms since ,  and now positive   EKG portion of echo stress (1 mm ST depression in inferior and   lateral leads) but no WMA present. This stress test was performed   on a patient who was hypertensive and with active chest pain   which is a contraindication for a stress test. Target heart rate   was not met, and stress test was stopped due to fatigue and   hypertensive response, making this stress test difficult truly   interpret. Patient with risk factors including, HTN, remote   history of tobacco abuse and elevated BMI. Family history of   heart disease includes grandparents with MI's >65 y.o but no 1st   degree relatives. ASCVD score is 3.3%, hence patient is not on a   statin. HARVINDER risk score on admission was 0.   Plan:  -Coronary CTa (done)  -TTE   -Continue Lisinopril 5 mg every day   -Increase Amlodipine to 5 mg every day at bedtime   -Start Atorvastatin 40 mg every day  -AM labs: ApoA1   -Will f/u with Dr. Rosario as outpatient at the end of the month.      Addendum 1817:   CTa 9/5/18  1.  High plaque burden with serial stenosis in the proximal to   mid LAD  that are moderate-severe in severity. Recommend further   evaluation  with invasive angiogram. Results conveyed to team. Minimal   disease in  the RCA and circumflex.  2.  Total Agatston score 189 placing the patient in the 95th  percentile when compared to age and gender matched control group.    I discussed results CTA wit patient and staff    IMPRESSION:  1.  High plaque burden with serial stenosis in the proximal to   mid LAD  that are moderate-severe in severity. Recommend further   evaluation  with invasive angiogram. Results conveyed to team. Minimal   disease in  the RCA and circumflex.  2.  Total Agatston score 189 placing the patient in the 95th  percentile when compared to age and gender matched control group.  3.  Please review Radiology report for incidental noncardiac   findings  that will follow separately.     FINDINGS:     CORONARY CALCIUM  SCORE     Total Agatston calcium score: 189   Left main: 3  Left anterior descendin  Left circumflex: 0  Right coronary artery: 16   This places the patient in the 95th percentile when compared to   age  and gender matched control group.     CORONARY ANGIOGRAPHY     DOMINANCE: Right dominant system.   Normal coronary origins and course.     LEFT MAIN:   The left main arises normally from the left coronary cusp and has   a  minimal (<25%) distal stenosis composed of noncalcified plaque.     LEFT ANTERIOR DESCENDING:   Proximal LAD: Moderate (50-69%) stenosis composed of mixed   plaque.  Mid LAD: Moderate-severe (50-69%) stenosis composed of   noncalcified  plaque.  Second diagonal: Mild (25-49%) stenosis composed of noncalcified  plaque.  The remainder of the left anterior descending and its major   diagonal  branches are patent with minimal luminal irregularities.     LEFT CIRCUMFLEX:   The left circumflex and its major marginal branches are patent   with  minimal luminal irregularities     RIGHT CORONARY ARTERY:   Mid RCA: Minimal (<25%) stenosis composed of mixed plaque.  The remainder of the right coronary and the posterior descending  artery are patent with minimal luminal irregularities.     ADDITIONAL FINDINGS:      The proximal ascending aorta is normal in size.   Normal pulmonary venous anatomy with all four pulmonary veins   draining  into the left atrium.    There is no left ventricular mass or thrombus.   Normal pericardial thickness. There is no pericardial effusion    No lymphadenopathy demonstrated. Heart size is normal. Thoracic   aortic  root is normal in size. Mild calcified and noncalcified  atherosclerotic plaque of the LAD with mild stenosis. RCA and   left  circumflex artery are patent. Normal conus branch. Normal ASD   branch.  Acute marginals and obtuse marginals are patent. Dominant right  coronary artery anatomy.     Main pulmonary artery is normal in size. No pericardial  effusion.  Thoracic aorta and main pulmonary artery are normal in size.  Visualized portions of the esophagus are normal. The visualized   lungs  are otherwise clear. Imaged portions of the central airways are   clear.            TTE 18  Global and regional left ventricular function is normal with an   EF of 55-60%.  Global right ventricular function is normal.  The inferior vena cava was normal in size with preserved   respiratory  variability.  No pericardial effusion is present.      I have discussed the above with Dr. Rosario.    Thank you for consulting the cardiovascular services at the   [facility]. Please do not hesitate to   call us with any questions.    Start atorvastatin 40 mg/d - his LDL-chol 28 was 78 mg/dl  Start lisinopril 5 mg in the morning    Start 2.5 mg amlodipin at bedtime  Start aspirin 81 mg/d    Follow-up outpatient cardiology with Dr Rosario - end September        Pretty Ennis MD  Internal Medicine, PGY-3    I interviewed and examined the patient with the house staff.  I   agree with the assessment and plan as documented.    Malik Rosario MD, PhD  Professor of Medicine  Division of Cardiology       Troponin POCT   Result Value Ref Range    Troponin I 0.01 0.00 - 0.10 ug/L   Echo stress test with definity    Narrative    [id number]  [id number]  [id number]  [id number]^SHAD^KATERIN^LEIA           [facility],[place]  Echocardiography Laboratory  [address] [place] [id number]  Name: NERISSA MILLS  MRN: [id number]  : 1970  Study Date: 2018 08:54 AM  Age: 48 yrs  Gender: Male  Patient Location: [facility]  Reason For Study: Chest Pain  Ordering Physician: KATERIN KULKARNI  Performed By: Jos Matthews RDCS     BSA: 2.5 m2  Height: 75 in  Weight: 281 lb  HR: 46  BP: 148/89 mmHg  _____________________________________________________________________________  __        Procedure  Stress Echo Bike with two  dimensional, color and spectral Doppler performed.  Contrast Definity.  _____________________________________________________________________________  __        Interpretation Summary  Near maximal stress test, achieved 81% of the age predicted maximal heart  rate. Limiting symptom fatigue.     Hypertensive blood pressure response to exercise.  No angina symptoms with exercise.  1 mm ST segment depression in the inferior and lateral leads that normalize to  baseline 2 minutes into recovery.  Normal segmental and global LV function with EF of approximately 55-60% at  rest; with exercise left ventricular ventricular cavity size decreases and  LVEF increases to 65-70%. No stress induced regional wall motion  abnormalities.  Average functional capacity for age.  Normal aortic root and no significant valvular dysfunction noted on screening  2D and Doppler examination.  _____________________________________________________________________________  __     Stress  There was a hypertensive BP response to exercise.  This was an abnormal stress EKG.  Target Heart Rate was not achieved due to fatigue.  Sensitivity reduced due to submaximal heart rate.  The patient did not exhibit any symptoms during exercise.  Limiting Symptom: fatigue.  Peak MVO2 19.2 ml/kg/min .  Percent predicted MVO2 67 %.  RPP 20573.  Maximum workload 175 osorio.  Exercise was stopped due to fatigue.     Stress Results                                       Maximum Predicted HR:   170 bpm             Target HR: 145 bpm        % Maximum Predicted HR: 81 %                             Stage DurationHeart Rate   BP                                 (mm:ss)   (bpm)                        Baseline            46     148/89                          Peak    10:55     138   253/111                           Stress Duration:   10:55 mm:ss *                     Maximum Stress HR: 138 bpm *     Left Ventricle  Left ventricular systolic function is normal.     Aortic  Valve  The aortic valve is normal in structure and function.     Mitral Valve  There is mild mitral annular calcification.        Tricuspid Valve  The tricuspid valve is normal in structure and function.     Right Ventricle  The right ventricular systolic function is normal.     Pericardium  There is no pericardial effusion.     Contrast  Definity (NDC #59723-743-40) given intravenously. Patient was given 5ml  mixture of 1.5ml Definity and 8.5ml saline. 5 ml wasted.     _____________________________________________________________________________  __  MMode/2D Measurements & Calculations  asc Aorta Diam: 3.9 cm        Doppler Measurements & Calculations  PA acc time: 0.12 sec              _____________________________________________________________________________  __        Report approved by: Merritt León 2018 10:25 AM      ECHO COMPLETE WITH OPTISON    Narrative    [id number]  [id number]  [id number]  [id number]^CARLTON CUELLO^LORENA           [facility],[place]  Echocardiography Laboratory  [address] [place] [id number]     Name: NERISSA MILLS  MRN: [id number]  : 1970  Study Date: 2018 10:44 AM  Age: 48 yrs  Gender: Male  Patient Location: [facility]  Reason For Study: Abn Stress Test  Ordering Physician: LORENA KYLE  Performed By: Newton Patrick RDCS     BSA: 2.5 m2  Height: 75 in  Weight: 275 lb  BP: 146/89 mmHg  _____________________________________________________________________________  __        Procedure  Complete Portable Echo Adult. Contrast Optison. Technically difficult study.  Optison (NDC #7680-1345-96) given intravenously. Patient was given 6 ml  mixture of 3 ml Optison and 6 ml saline. 3 ml wasted.  _____________________________________________________________________________  __        Interpretation Summary  Global and regional left ventricular function is normal with an EF of 55-60%.  Global right ventricular function is normal.  The  inferior vena cava was normal in size with preserved respiratory  variability.  No pericardial effusion is present.  _____________________________________________________________________________  __        Left Ventricle  Left ventricular size is normal. Global and regional left ventricular function  is normal with an EF of 55-60%.     Right Ventricle  The right ventricle is normal size. Global right ventricular function is  normal.     Mitral Valve  The mitral valve is normal.        Aortic Valve  Aortic valve is normal in structure and function.     Tricuspid Valve  The tricuspid valve is normal.     Vessels  The inferior vena cava was normal in size with preserved respiratory  variability.     Pericardium  No pericardial effusion is present.     Compared to Previous Study  This study was compared with the study from 18 . There has been no change.     _____________________________________________________________________________  __        Doppler Measurements & Calculations  MV E max zeferino: 62.4 cm/sec  MV A max zeferino: 77.1 cm/sec  MV E/A: 0.81  MV dec slope: 230.5 cm/sec2     E/E' av.1  Lateral E/e': 5.0  Medial E/e': 7.2     _____________________________________________________________________________  __           Report approved by: Merritt León 2018 11:39 AM      Glucose CSF: Tube 1   Result Value Ref Range    Glucose CSF 52 40 - 70 mg/dL   Protein total CSF: Tube 1   Result Value Ref Range    Protein Total CSF 51 15 - 60 mg/dL   Cell count with differential CSF: Tube 4   Result Value Ref Range    WBC CSF 3 0 - 5 /uL    RBC CSF 17 (H) 0 - 2 /uL    Tube Number 4 #    Color CSF Colorless CLRL^Colorless    Appearance CSF Clear CLER^Clear   Gram stain CSF Tube 2   Result Value Ref Range    Specimen Description Cerebrospinal fluid     Gram Stain No organisms seen     Gram Stain No  WBC'S seen      CSF Culture Aerobic Bacterial   Result Value Ref Range    Specimen Description Cerebrospinal fluid      Special Requests TUBE 2     Culture Micro Culture negative monitoring continues        Assessment and Plan        1. Benign essential hypertension  Monitor BP goal <135/85  Drink more water    2. Coronary artery disease involving native coronary artery of native heart without angina pectoris  Exercise 5 days a week, 30 min vigorous   Weight watchers    3. Follow   Please call or return to clinic if your symptoms don't go away.    Follow up plan  Please make a clinic appointment for follow up with me (RICHY DORSEY) in 2-3  months for recheck.         There are no discontinued medications.    Options for treatment and follow-up care were reviewed with the patient. Lui Arrington  engaged in the decision making process and verbalized understanding of the options discussed and agreed with the final plan.    Richy Dorsey MD

## 2018-09-11 LAB
BACTERIA SPEC CULT: NO GROWTH
Lab: NORMAL
SPECIMEN SOURCE: NORMAL

## 2018-09-18 ENCOUNTER — OFFICE VISIT (OUTPATIENT)
Dept: NEUROLOGY | Facility: CLINIC | Age: 48
End: 2018-09-18
Payer: COMMERCIAL

## 2018-09-18 DIAGNOSIS — I67.1 CEREBRAL ANEURYSM, NONRUPTURED: ICD-10-CM

## 2018-09-18 DIAGNOSIS — R20.2 PARESTHESIA: ICD-10-CM

## 2018-09-18 DIAGNOSIS — R20.2 PARESTHESIA: Primary | ICD-10-CM

## 2018-09-18 LAB — VIT B12 SERPL-MCNC: 442 PG/ML (ref 193–986)

## 2018-09-18 RX ORDER — CETIRIZINE HYDROCHLORIDE 10 MG/1
10 TABLET ORAL DAILY
COMMUNITY
End: 2019-12-10

## 2018-09-18 ASSESSMENT — ENCOUNTER SYMPTOMS
EXERCISE INTOLERANCE: 0
LOSS OF CONSCIOUSNESS: 0
DIZZINESS: 0
NUMBNESS: 1
HYPOTENSION: 0
MEMORY LOSS: 0
MYALGIAS: 0
CHILLS: 0
POLYPHAGIA: 0
MUSCLE CRAMPS: 1
NECK PAIN: 0
TASTE DISTURBANCE: 0
LEG PAIN: 0
BACK PAIN: 0
FEVER: 0
SPEECH CHANGE: 0
MUSCLE WEAKNESS: 0
SINUS CONGESTION: 1
POLYDIPSIA: 0
TINGLING: 1
WEAKNESS: 0
LIGHT-HEADEDNESS: 1
SINUS PAIN: 1
TROUBLE SWALLOWING: 0
HALLUCINATIONS: 0
ARTHRALGIAS: 1
HOARSE VOICE: 1
STIFFNESS: 0
WEIGHT LOSS: 0
HYPERTENSION: 1
SEIZURES: 0
INCREASED ENERGY: 0
NECK MASS: 0
SMELL DISTURBANCE: 0
TREMORS: 0
HEADACHES: 0
WEIGHT GAIN: 0
ORTHOPNEA: 0
SORE THROAT: 1
SYNCOPE: 0
ALTERED TEMPERATURE REGULATION: 0
PALPITATIONS: 0
DECREASED APPETITE: 0
JOINT SWELLING: 0
PARALYSIS: 0
NIGHT SWEATS: 0
FATIGUE: 1
SLEEP DISTURBANCES DUE TO BREATHING: 1
DISTURBANCES IN COORDINATION: 0

## 2018-09-18 ASSESSMENT — PAIN SCALES - GENERAL: PAINLEVEL: NO PAIN (0)

## 2018-09-18 NOTE — MR AVS SNAPSHOT
After Visit Summary   9/18/2018    Lui Arrington    MRN: [id number]           Patient Information     Date Of Birth          1970        Visit Information        Provider Department      9/18/2018 8:30 AM Guillermina Issa MD [facility] Neurology        Today's Diagnoses     Paresthesia    -  1       Follow-ups after your visit        Follow-up notes from your care team     Return if symptoms worsen or fail to improve.      Your next 10 appointments already scheduled     Sep 18, 2018  9:15 AM CDT   LAB with  LAB   [facility] Lab ([facility])    [address] [place] [id number] [phone]           Please do not eat 10-12 hours before your appointment if you are coming in fasting for labs on lipids, cholesterol, or glucose (sugar). This does not apply to pregnant women. Water, hot tea and black coffee (with nothing added) are okay. Do not drink other fluids, diet soda or chew gum.            Oct 03, 2018  8:30 AM CDT   (Arrive by 8:15 AM)   EMG with Guillermina Issa MD   [facility] EMG ([facility])    [address] [place] [id number] [phone]           Do not use lotions or creams on the area to be tested. If you are on blood thinners (Warfarin, Coumadin, Lovenox, etc), please contact your primary care physician to check if it is safe to stop them 3 days prior to testing. If you have anxiety, please check with your referring physician to obtain anti-anxiety medication for the procedure.              Future tests that were ordered for you today     Open Future Orders        Priority Expected Expires Ordered    EMG Routine  9/18/2019 9/18/2018    Vitamin B12 Routine  9/18/2019 9/18/2018    Methylmalonic acid Routine  9/19/2019 9/18/2018    ELP Routine  9/18/2019 9/18/2018            Who to contact     Please call your clinic at [phone] to:    Ask questions about  your health    Make or cancel appointments    Discuss your medicines    Learn about your test results    Speak to your doctor            Additional Information About Your Visit        [employer] Information     [employer] gives you secure access to your electronic health record. If you see a primary care provider, you can also send messages to your care team and make appointments. If you have questions, please call your primary care clinic.  If you do not have a primary care provider, please call [phone] and they will assist you.      [employer] is an electronic gateway that provides easy, online access to your medical records. With [employer], you can request a clinic appointment, read your test results, renew a prescription or communicate with your care team.     To access your existing account, please contact your [facility] or call [phone] for assistance.        Care EveryWhere ID     This is your Care EveryWhere ID. This could be used by other organizations to access your [facility] medical records  [id number]         Blood Pressure from Last 3 Encounters:   09/07/18 139/87   09/06/18 131/73   08/24/18 143/87    Weight from Last 3 Encounters:   09/07/18 126 kg (277 lb 12.8 oz)   08/24/18 127.6 kg (281 lb 3.2 oz)   09/29/17 124.1 kg (273 lb 9.6 oz)               Primary Care Provider Office Phone # Fax #    Richy Dorsey [phone]       [address] [address]        Equal Access to Services     AHSAN STRANGE : Hadii aad ku hadasho Soomaali, waaxda luqadaha, qaybta kaalmada adeegyada, waxay kraig watkins . So [facility] [phone].    ATENCIÓN: Si habla español, tiene a cortez disposición servicios gratuitos de asistencia lingüística. Elba al [phone].    We comply with applicable federal civil rights laws and [place] laws. We do not discriminate on the basis of race, color, national origin, age, disability, sex, sexual  orientation, or gender identity.            Thank you!     Thank you for choosing [facility] NEUROLOGY  for your care. Our goal is always to provide you with excellent care. Hearing back from our patients is one way we can continue to improve our services. Please take a few minutes to complete the written survey that you may receive in the mail after your visit with us. Thank you!             Your Updated Medication List - Protect others around you: Learn how to safely use, store and throw away your medicines at www.disposemymeds.org.          This list is accurate as of 9/18/18  9:07 AM.  Always use your most recent med list.                   Brand Name Dispense Instructions for use Diagnosis    amLODIPine 2.5 MG tablet    NORVASC    90 tablet    Take 1 tablet (2.5 mg) by mouth daily    Essential hypertension       aspirin 81 MG EC tablet     90 tablet    Take 1 tablet (81 mg) by mouth daily    Chest pain, unspecified type       atorvastatin 40 MG tablet    LIPITOR    90 tablet    Take 1 tablet (40 mg) by mouth every evening    Chest pain, unspecified type       cetirizine 10 MG tablet    zyrTEC     Take 10 mg by mouth daily        lisinopril 5 MG tablet    PRINIVIL/ZESTRIL    30 tablet    Take 1 tablet (5 mg) by mouth daily    Benign essential hypertension       nitroGLYcerin 0.4 MG sublingual tablet    NITROSTAT    90 tablet    For chest pain place 1 tablet under the tongue every 5 minutes for 3 doses. If symptoms persist 5 minutes after 1st dose call 911.    Chest pain, unspecified type

## 2018-09-18 NOTE — PROGRESS NOTES
[facility]    Lui Arrington MRN# [id number]   Age: 48 year old YOB: 1970     Requesting physician: Referred Self  Richy Dorsey     Chief Complaint:  Hospital follow-up appointment    History of Present Illness:  Lui is a 48-year-old right-handed gentleman who had presented to the hospital recently while I was covering hospital service.  The patient had presented with some paresthesias in the left hand accompanied by some dyspnea and chest pain.  While in the hospital he was ruled out for a myocardial infarction but has been found to have some undertreated risk factors.  He is awaiting cardiology outpatient visit to discuss whether he needs further testing.    In the setting of the paresthesias his right hand started having paresthesias the second day of hospital stay.  On examination he was noted to have some weakness with finger extension and interossei strength and there was a concern that he could be developing some sort of transverse myelitis or Guillain Barré.  The patient had an MRI scan of the brain and MRI of the cervical spine with no significant findings to explain the numbness and weakness.  He then went on to have a lumbar puncture which showed normal results.  The patient was dismissed home and recommended to return to this follow-up.    The patient reports that no symptoms are worse and in fact symptoms are slightly better but persistent.  He complains of intermittent paresthesias in both hands.  He feels it is most noticeable when he is inactive.  No worsening weakness.    He denies any ill effects from the lumbar puncture.  Specifically he denies any post LP headache or back tenderness.    Past Medical History:   Diagnosis Date     Brain aneurysm     Small     Coronary artery disease      Diverticulosis      Seasonal allergies      Substance dependence, in remission (H) 1993       Patient Active Problem List   Diagnosis     Benign essential hypertension     Coronary  artery disease involving native coronary artery of native heart without angina pectoris     Cerebral aneurysm, nonruptured       Past Surgical History:   Procedure Laterality Date     COLONOSCOPY  8/4/2014    Procedure: COMBINED COLONOSCOPY, SINGLE BIOPSY/POLYPECTOMY BY BIOPSY;  Surgeon: Pb Celaya MD;  Location:  GI     VASECTOMY         Social History     Social History     Marital status:      Spouse name: N/A     Number of children: N/A     Years of education: N/A     Occupational History     Not on file.     Social History Main Topics     Smoking status: Former Smoker     Quit date: 1/1/1998     Smokeless tobacco: Never Used     Alcohol use No     Drug use: No      Comment: in remission 1993, marijuana, ETOH, narcotic (no IV use)     Sexual activity: Yes     Partners: Female     Other Topics Concern     Not on file     Social History Narrative    Self-employed, ethics and leadership education. , has 4 yo son plus two children from previous marriage. Non-smoker. No alcohol use (abstains due to remote hx of alcoholism), no other drug use.        Family History   Problem Relation Age of Onset     Cancer Mother      Cancer - colorectal Mother      Hypertension Mother      Crohn Disease Mother      Cancer Father      Diabetes Maternal Grandmother      C.A.D. Maternal Grandmother      C.A.D. Paternal Grandfather      Hypertension Brother        Current Outpatient Prescriptions   Medication Sig     amLODIPine (NORVASC) 2.5 MG tablet Take 1 tablet (2.5 mg) by mouth daily     aspirin 81 MG EC tablet Take 1 tablet (81 mg) by mouth daily     atorvastatin (LIPITOR) 40 MG tablet Take 1 tablet (40 mg) by mouth every evening     cetirizine (ZYRTEC) 10 MG tablet Take 10 mg by mouth daily     lisinopril (PRINIVIL/ZESTRIL) 5 MG tablet Take 1 tablet (5 mg) by mouth daily     nitroGLYcerin (NITROSTAT) 0.4 MG sublingual tablet For chest pain place 1 tablet under the tongue every 5 minutes for 3 doses. If  symptoms persist 5 minutes after 1st dose call 911. (Patient not taking: Reported on 9/18/2018)     No current facility-administered medications for this visit.           Allergies   Allergen Reactions     Ciprofloxacin Hives     Erythromycin        ROS: Please see HPI all other systems review and negative.    Physical Examination:  None taken by rooming staff  General Appearance:  The patient is well-groomed and cooperative with examination.  He is in no acute distress  Neurological Examination  Cognition: oriented x3, attention and recall intact. No aphasia or dysarthria  The patient has normal strength in the upper extremities.  Coordination testing with finger taps is normal.  Reflexes are normal and bilaterally symmetric.  Sensory testing to pinprick reveals some subjective numbness in the left and right hand in the distal fingertips of the second and third digit.  Phalen sign is positive today for reproducing numbness.  Tinel's is negative    Cardiovascular Examination:  Heart is regular in rate and rhythm to auscultation. No significant murmurs. No carotid bruits. No significant peripheral edema. Pedal pulses are palpable bilaterally.     Musculoskeletal Examination:  Neck is supple with full range of motion. No tenderness to palpation.        Impression/Recommendations:  1.  Paresthesias  The patient is reporting continued intermittent paresthesias without a clear trigger.  I will send him for EMG testing to look into a peripheral nerve impingement syndrome such as median nerve entrapment/carpal tunnel syndrome.    Further laboratory testing will be run including a vitamin B12 and SPEP with immunofixation.    30 minutes with the patient over 50% counseling and review of symptoms.  Patient is reassured and less anxious than he was in the hospital.    2.  Incidental cerebral aneurysm  While the patient was hospitalized it was found that he had an incidental cerebral aneurysm that is small and just requires  monitoring.  Repeat MRA scan in one years time.    Guillermina Issa MD [facility]  Department of Neurology  Pager [phone]        Answers for HPI/ROS submitted by the patient on 9/18/2018   General Symptoms: Yes  Skin Symptoms: No  HENT Symptoms: Yes  EYE SYMPTOMS: No  HEART SYMPTOMS: Yes  LUNG SYMPTOMS: No  INTESTINAL SYMPTOMS: No  URINARY SYMPTOMS: No  REPRODUCTIVE SYMPTOMS: No  SKELETAL SYMPTOMS: Yes  BLOOD SYMPTOMS: No  NERVOUS SYSTEM SYMPTOMS: Yes  MENTAL HEALTH SYMPTOMS: No  Fever: No  Loss of appetite: No  Weight loss: No  Weight gain: No  Fatigue: Yes  Night sweats: No  Chills: No  Increased stress: Yes  Excessive hunger: No  Excessive thirst: No  Feeling hot or cold when others believe the temperature is normal: No  Loss of height: No  Post-operative complications: No  Surgical site pain: No  Hallucinations: No  Change in or Loss of Energy: No  Hyperactivity: No  Confusion: No  Ear pain: No  Ear discharge: No  Hearing loss: Yes  Tinnitus: No  Nosebleeds: No  Congestion: Yes  Sinus pain: Yes  Trouble swallowing: No   Voice hoarseness: Yes  Mouth sores: No  Sore throat: Yes  Tooth pain: No  Gum tenderness: No  Bleeding gums: No  Change in taste: No  Change in sense of smell: No  Dry mouth: No  Hearing aid used: No  Neck lump: No  Chest pain or pressure: Yes  Fast or irregular heartbeat: No  Pain in legs with walking: No  Trouble breathing while lying down: No  Fingers or toes appear blue: No  High blood pressure: Yes  Low blood pressure: No  Fainting: No  Murmurs: No  Pacemaker: No  Varicose veins: No  Edema or swelling: No  Wake up at night with shortness of breath: Yes  Light-headedness: Yes  Exercise intolerance: No  Back pain: No  Muscle aches: No  Neck pain: No  Swollen joints: No  Joint pain: Yes  Bone pain: No  Muscle cramps: Yes  Muscle weakness: No  Joint stiffness: No  Bone fracture: No  Trouble with coordination: No  Dizziness or trouble with balance: No  Fainting or black-out spells: No  Memory loss:  No  Headache: No  Seizures: No  Speech problems: No  Tingling: Yes  Tremor: No  Weakness: No  Difficulty walking: No  Paralysis: No  Numbness: Yes

## 2018-09-18 NOTE — LETTER
9/18/2018       RE: Lui Arrington  [address] [place] [address]     Dear Colleague,    Thank you for referring your patient, Lui Arrington, to the [facility] NEUROLOGY at [facility]. Please see a copy of my visit note below.    [facility]    Lui Arrington MRN# [id number]   Age: 48 year old YOB: 1970     Requesting physician: Referred Self  Richy Dorsey     Chief Complaint:  Hospital follow-up appointment    History of Present Illness:  Lui is a 48-year-old right-handed gentleman who had presented to the hospital recently while I was covering hospital service.  The patient had presented with some paresthesias in the left hand accompanied by some dyspnea and chest pain.  While in the hospital he was ruled out for a myocardial infarction but has been found to have some undertreated risk factors.  He is awaiting cardiology outpatient visit to discuss whether he needs further testing.    In the setting of the paresthesias his right hand started having paresthesias the second day of hospital stay.  On examination he was noted to have some weakness with finger extension and interossei strength and there was a concern that he could be developing some sort of transverse myelitis or Guillain Barré.  The patient had an MRI scan of the brain and MRI of the cervical spine with no significant findings to explain the numbness and weakness.  He then went on to have a lumbar puncture which showed normal results.  The patient was dismissed home and recommended to return to this follow-up.    The patient reports that no symptoms are worse and in fact symptoms are slightly better but persistent.  He complains of intermittent paresthesias in both hands.  He feels it is most noticeable when he is inactive.  No worsening weakness.    He denies any ill effects from the lumbar puncture.  Specifically he denies any post LP headache or back  tenderness.    Past Medical History:   Diagnosis Date     Brain aneurysm     Small     Coronary artery disease      Diverticulosis      Seasonal allergies      Substance dependence, in remission (H) 1993       Patient Active Problem List   Diagnosis     Benign essential hypertension     Coronary artery disease involving native coronary artery of native heart without angina pectoris     Cerebral aneurysm, nonruptured       Past Surgical History:   Procedure Laterality Date     COLONOSCOPY  8/4/2014    Procedure: COMBINED COLONOSCOPY, SINGLE BIOPSY/POLYPECTOMY BY BIOPSY;  Surgeon: Pb Celaya MD;  Location: [facility] GI     VASECTOMY         Social History     Social History     Marital status:      Spouse name: N/A     Number of children: N/A     Years of education: N/A     Occupational History     Not on file.     Social History Main Topics     Smoking status: Former Smoker     Quit date: 1/1/1998     Smokeless tobacco: Never Used     Alcohol use No     Drug use: No      Comment: in remission 1993, marijuana, ETOH, narcotic (no IV use)     Sexual activity: Yes     Partners: Female     Other Topics Concern     Not on file     Social History Narrative    Self-employed, ethics and leadership education. , has 4 yo son plus two children from previous marriage. Non-smoker. No alcohol use (abstains due to remote hx of alcoholism), no other drug use.        Family History   Problem Relation Age of Onset     Cancer Mother      Cancer - colorectal Mother      Hypertension Mother      Crohn Disease Mother      Cancer Father      Diabetes Maternal Grandmother      C.A.D. Maternal Grandmother      C.A.D. Paternal Grandfather      Hypertension Brother        Current Outpatient Prescriptions   Medication Sig     amLODIPine (NORVASC) 2.5 MG tablet Take 1 tablet (2.5 mg) by mouth daily     aspirin 81 MG EC tablet Take 1 tablet (81 mg) by mouth daily     atorvastatin (LIPITOR) 40 MG tablet Take 1 tablet (40 mg) by  mouth every evening     cetirizine (ZYRTEC) 10 MG tablet Take 10 mg by mouth daily     lisinopril (PRINIVIL/ZESTRIL) 5 MG tablet Take 1 tablet (5 mg) by mouth daily     nitroGLYcerin (NITROSTAT) 0.4 MG sublingual tablet For chest pain place 1 tablet under the tongue every 5 minutes for 3 doses. If symptoms persist 5 minutes after 1st dose call 911. (Patient not taking: Reported on 9/18/2018)     No current facility-administered medications for this visit.           Allergies   Allergen Reactions     Ciprofloxacin Hives     Erythromycin        ROS: Please see HPI all other systems review and negative.    Physical Examination:  None taken by rooming staff  General Appearance:  The patient is well-groomed and cooperative with examination.  He is in no acute distress  Neurological Examination  Cognition: oriented x3, attention and recall intact. No aphasia or dysarthria  The patient has normal strength in the upper extremities.  Coordination testing with finger taps is normal.  Reflexes are normal and bilaterally symmetric.  Sensory testing to pinprick reveals some subjective numbness in the left and right hand in the distal fingertips of the second and third digit.  Phalen sign is positive today for reproducing numbness.  Tinel's is negative    Cardiovascular Examination:  Heart is regular in rate and rhythm to auscultation. No significant murmurs. No carotid bruits. No significant peripheral edema. Pedal pulses are palpable bilaterally.     Musculoskeletal Examination:  Neck is supple with full range of motion. No tenderness to palpation.        Impression/Recommendations:  1.  Paresthesias  The patient is reporting continued intermittent paresthesias without a clear trigger.  I will send him for EMG testing to look into a peripheral nerve impingement syndrome such as median nerve entrapment/carpal tunnel syndrome.    Further laboratory testing will be run including a vitamin B12 and SPEP with immunofixation.    30  minutes with the patient over 50% counseling and review of symptoms.  Patient is reassured and less anxious than he was in the hospital.    2.  Incidental cerebral aneurysm  While the patient was hospitalized it was found that he had an incidental cerebral aneurysm that is small and just requires monitoring.  Repeat MRA scan in one years time.    Guillermina Issa MD [facility]  Department of Neurology  Pager [phone]

## 2018-09-18 NOTE — NURSING NOTE
Chief Complaint   Patient presents with     RECHECK     [facility] RETURN - TINGLING/NUMBNESS IN HANDS, F/U       Alvaro Phillips, EMT

## 2018-09-19 LAB
ALBUMIN SERPL ELPH-MCNC: 4.1 G/DL (ref 3.7–5.1)
ALPHA1 GLOB SERPL ELPH-MCNC: 0.3 G/DL (ref 0.2–0.4)
ALPHA2 GLOB SERPL ELPH-MCNC: 0.8 G/DL (ref 0.5–0.9)
B-GLOBULIN SERPL ELPH-MCNC: 0.7 G/DL (ref 0.6–1)
GAMMA GLOB SERPL ELPH-MCNC: 1.1 G/DL (ref 0.7–1.6)
M PROTEIN SERPL ELPH-MCNC: 0 G/DL
PROT PATTERN SERPL ELPH-IMP: NORMAL

## 2018-09-20 LAB — METHYLMALONATE SERPL-SCNC: 0.15 UMOL/L (ref 0–0.4)

## 2018-09-26 ENCOUNTER — TELEPHONE (OUTPATIENT)
Dept: NEUROLOGY | Facility: CLINIC | Age: 48
End: 2018-09-26

## 2018-09-26 DIAGNOSIS — I25.10 CORONARY ARTERY DISEASE INVOLVING NATIVE CORONARY ARTERY OF NATIVE HEART WITHOUT ANGINA PECTORIS: Primary | ICD-10-CM

## 2018-09-26 NOTE — TELEPHONE ENCOUNTER
Health Call Center    Phone Message    May a detailed message be left on voicemail: yes    Reason for Call: Medication Question or concern regarding medication   Prescription Clarification  Name of Medication: Nitroglycerin  Prescribing Provider: At hospital discahrge: Neurology resident Dr Thacker. Dr Issa saw on 9/18/2018 in follow up   Pharmacy: [facility]/PHARMACY #[id number] - [place], [place] - [address] [place], [place] [address]STEPHANIE HOLLOWAY S   What on the order needs clarification?   Pharmacist needed to notify phsyician that patient has history of Sildenafil use via [facility]. She has counseled patient but is also required to notify provider.   Writer added Sildenafil tas historical medication.          Action Taken: Message routed to:  [facility] ([facility] Neurology

## 2018-10-03 ENCOUNTER — OFFICE VISIT (OUTPATIENT)
Dept: NEUROLOGY | Facility: CLINIC | Age: 48
End: 2018-10-03
Payer: COMMERCIAL

## 2018-10-03 DIAGNOSIS — G56.12 LEFT MEDIAN NERVE NEUROPATHY: Primary | ICD-10-CM

## 2018-10-03 DIAGNOSIS — R20.2 PARESTHESIA: ICD-10-CM

## 2018-10-03 NOTE — LETTER
10/3/2018       RE: Lui Arrington  [address] [place] [address]     Dear Colleague,    Thank you for referring your patient, uLi Arrington, to the [facility] EMG at [facility]. Please see a copy of my visit note below.        [facility]  Electrodiagnostic Laboratory      Nerve Conduction & EMG Report          Patient: Lui Arrington YOB: 1970  Patient ID: [id number] Age: 48 Years 7 Months  Gender: Male      History & Examination:  I referred the patient for evaluation of left hand paresthesias that started fairly acutely.  The patient also had some chest pain at the time of presentation that has subsequently been evaluated by cardiology.  He reports continued pain around the left wrist with numbness.  No weakness on examination at this time.    Techniques:  Sensory and Motor conduction studies were done with surface recording electrodes. Limb temperature was maintained. EMG was done with a concentric needle electrode.     Results:  Sensory nerve conduction studies:  Antidromic studies bilaterally reveal normal amplitudes.  The left median nerve reveals a slowed conduction velocity all other nerves showed normal conduction velocities.  Singh studies were performed bilaterally.  The left median palmar study shows a prolonged distal latency.  Right side is considered normal.    Motor nerve conduction studies:  Right median motor study reveals normal distal latency, amplitude and conduction velocity.  Left median motor study reveals normal distal latency and normal amplitude at the wrist.  At the typical stimulation point of the median nerve at the elbow there is a sharp decrease in amplitude.  With stimulation over the ulnar nerve at the elbow but recording over the abductor pollicis brevis normal amplitude is noted.  Ulnar nerve studies bilaterally reveal normal distal latencies, amplitudes and conduction velocities.    Left median  F wave is within normal limits.    Needle examination of the left upper extremity was normal except for slightly larger amplitude units in the first dorsal interosseous with no other abnormalities seen.    Interpretation:  The EMG is abnormal.  The findings are consistent with the followin.  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  This suggests an anastomosis somewhere in the forearm.  2.  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  Clinical correlation is strongly advised in light of the anastomosis observed on motor studies.    Findings were discussed with the patient.  In light of the questionable median neuropathy at the wrist I have recommended a referral to hand surgery to discuss whether steroid injection at level of the carpal tunnel could be tried to see if the patient gets symptom relief.  In the meantime I would recommend he try some carpal tunnel wrist braces.  It is unusual to have such acute onset of symptoms and then only find a median neuropathy.  No other expiration for the patient's paresthesias has been found.      EMG Physician:  Guillermina Issa MD [facility]              Sensory NCS      Nerve / Sites Rec. Site Onset Peak Ref. NP Amp Ref. PP Amp Dist Eloy Ref. Temp     ms ms ms  V  V  V cm m/s m/s  C   R MEDIAN - Dig II Anti      Wrist Dig II 2.40 3.44  23.0 10.0 36.4 14 58.4 48.0 31.5   L MEDIAN - Dig II Anti      Wrist Dig II 3.54 4.43  16.8 10.0 29.3 14 39.5 48.0 32.5   R ULNAR - Dig V Anti      Wrist Dig V 2.34 2.97  18.5 8.0 34.1 12.5 53.3 48.0 31.5   L ULNAR - Dig V Anti      Wrist Dig V 2.45 3.28  16.8 8.0 27.5 12.5 51.1 48.0 32.5   L RADIAL - Snuff      Forearm Snuff 1.98 2.76  18.9 15.0 26.8 11 55.6 48.0 32.1   R MEDIAN - Ulnar - Palmar      Median Wrist 1.56 2.19 2.40 38.4  67.0 8 51.2  33.1      Ulnar Wrist 1.35 1.88 2.40 9.7  15.7 8 59.1  31.7   L MEDIAN - Ulnar - Palmar       Median Wrist 2.34 2.92 2.40 26.5  31.9 8 34.1  31.9      Ulnar Wrist 1.56 2.03 2.40 5.3  7.5 8 51.2  31.3       Motor NCS      Nerve / Sites Rec. Site Lat Ref. Amp Ref. Rel Amp Dist Eloy Ref. Dur. Area Temp.     ms ms mV mV % cm m/s m/s ms %  C   R MEDIAN - APB      Wrist APB 3.75 4.40 10.6 5.0 100 8   7.55 100 34.9      Elbow APB 8.23  9.3  87.9 24 53.6 48.0 7.55 84.1 35   L MEDIAN - APB      Wrist APB 4.22 4.40 10.0 5.0 100 8   6.72 100 32.4      Median- Elbow APB 10.83  0.4  3.91 30 45.4 48.0 5.99 3.59 32      Ulnar Elbow- MG APB 10.52  10.8  108 34 54.0  6.25 89.8 31.3   R ULNAR - ADM      Wrist ADM 2.81 3.50 10.8 5.0 100 8   6.20 100 33.5      B.Elbow ADM 7.03  10.0  92.5 24 56.9 48.0 6.51 95.1 33.6      A.Elbow ADM 8.65  9.8  90.7 9 55.7 48.0 6.41 90 33.5   L ULNAR - ADM      Wrist ADM 3.18 3.50 10.9 5.0 100 8   6.67 100 30.4      B.Elbow ADM 7.29  10.4  95.6 23 55.9 48.0 6.93 102 30.6      A.Elbow ADM 8.91  10.2  93.5 9 55.7 48.0 6.82 96.4 30.6       F  Wave      Nerve Min F Lat Max F Lat Mean FLat Temp.    ms ms ms  C   L MEDIAN 31.30 33.59 32.13 31.1       EMG Summary Table     Spontaneous MUAP Recruitment    IA Fib/PSW Fasc H.F. Amp Dur. PPP Pattern   L. BICEPS N None None None N N None Normal   L. TRICEPS N None None None N N None Normal   L. FLEX CARPI RAD N None None None N N None Normal   L. FIRST D INTEROSS N None None None 1+ N None Normal   L. ABD POLL BREVIS N None None None N N None Normal                                    Again, thank you for allowing me to participate in the care of your patient.      Sincerely,    Guillermina Issa MD

## 2018-10-03 NOTE — MR AVS SNAPSHOT
After Visit Summary   10/3/2018    Lui Arrington    MRN: [id number]           Patient Information     Date Of Birth          1970        Visit Information        Provider Department      10/3/2018 8:30 AM Guillermina Issa MD [facility]        Today's Diagnoses     Left median nerve neuropathy    -  1    Paresthesia           Follow-ups after your visit        Additional Services     ORTHOPEDICS ADULT REFERRAL       Your provider has referred you to: [facility]: Orthopaedic [facility] [phone]   [email or web address]      Hand surgeon only please-pt has paresthesias in the left hand and an anatomical variant with ulnar fibers contributing to the median nerve. Please consider steroid injection at Carpal Tunnel to see if symptoms resolve.    Please be aware that coverage of these services is subject to the terms and limitations of your health insurance plan.  Call member services at your health plan with any benefit or coverage questions.      Please bring the following to your appointment:    >>   Any x-rays, CTs or MRIs which have been performed.  Contact the facility where they were done to arrange for  prior to your scheduled appointment.    >>   List of current medications   >>   This referral request   >>   Any documents/labs given to you for this referral                  Future tests that were ordered for you today     Open Future Orders        Priority Expected Expires Ordered    HC NEEDLE EMG EA EXTREMTY W/PARASPINAL AREA COMPLETE Routine  10/3/2019 10/3/2018            Who to contact     Please call your clinic at [phone] to:    Ask questions about your health    Make or cancel appointments    Discuss your medicines    Learn about your test results    Speak to your doctor            Additional Information About Your Visit        MyChart Information     [employer] gives you secure access to your electronic health record.  If you see a primary care provider, you can also send messages to your care team and make appointments. If you have questions, please call your primary care clinic.  If you do not have a primary care provider, please call [phone] and they will assist you.      [employer] is an electronic gateway that provides easy, online access to your medical records. With [employer], you can request a clinic appointment, read your test results, renew a prescription or communicate with your care team.     To access your existing account, please contact your [facility] or call [phone] for assistance.        Care EveryWhere ID     This is your Care EveryWhere ID. This could be used by other organizations to access your [place] medical records  [id number]         Blood Pressure from Last 3 Encounters:   09/07/18 139/87   09/06/18 131/73   08/24/18 143/87    Weight from Last 3 Encounters:   09/07/18 126 kg (277 lb 12.8 oz)   08/24/18 127.6 kg (281 lb 3.2 oz)   09/29/17 124.1 kg (273 lb 9.6 oz)              We Performed the Following     EMG     HC NCS MOTOR W OR W/O F-WAVE, 9 OR 10     ORTHOPEDICS ADULT REFERRAL        Primary Care Provider Office Phone # Fax #    Richy Dorsey [phone]       [address] [address]        Equal Access to Services     AHSAN STRANGE : Hadii aad ku hadasho Soelmo, waaxda luqadaha, qaybta kaalmada diane, beto colbert. So [facility] [phone].    ATENCIÓN: Si habla español, tiene a cortez disposición servicios gratuitos de asistencia lingüística. Elba al [phone].    We comply with applicable federal civil rights laws and [place] laws. We do not discriminate on the basis of race, color, national origin, age, disability, sex, sexual orientation, or gender identity.            Thank you!     Thank you for choosing [facility]  for your care. Our goal is always to provide you with excellent  care. Hearing back from our patients is one way we can continue to improve our services. Please take a few minutes to complete the written survey that you may receive in the mail after your visit with us. Thank you!             Your Updated Medication List - Protect others around you: Learn how to safely use, store and throw away your medicines at www.disposemymeds.org.          This list is accurate as of 10/3/18  3:35 PM.  Always use your most recent med list.                   Brand Name Dispense Instructions for use Diagnosis    amLODIPine 2.5 MG tablet    NORVASC    90 tablet    Take 1 tablet (2.5 mg) by mouth daily    Essential hypertension       aspirin 81 MG EC tablet     90 tablet    Take 1 tablet (81 mg) by mouth daily    Chest pain, unspecified type       atorvastatin 40 MG tablet    LIPITOR    90 tablet    Take 1 tablet (40 mg) by mouth every evening    Chest pain, unspecified type       cetirizine 10 MG tablet    zyrTEC     Take 10 mg by mouth daily        lisinopril 5 MG tablet    PRINIVIL/ZESTRIL    30 tablet    Take 1 tablet (5 mg) by mouth daily    Benign essential hypertension       nitroGLYcerin 0.4 MG sublingual tablet    NITROSTAT    90 tablet    For chest pain place 1 tablet under the tongue every 5 minutes for 3 doses. If symptoms persist 5 minutes after 1st dose call 911.    Chest pain, unspecified type       SILDENAFIL CITRATE PO

## 2018-10-03 NOTE — PROGRESS NOTES
[facility]  Electrodiagnostic Laboratory      Nerve Conduction & EMG Report          Patient: Lui Arrington YOB: 1970  Patient ID: [id number] Age: 48 Years 7 Months  Gender: Male      History & Examination:  I referred the patient for evaluation of left hand paresthesias that started fairly acutely.  The patient also had some chest pain at the time of presentation that has subsequently been evaluated by cardiology.  He reports continued pain around the left wrist with numbness.  No weakness on examination at this time.    Techniques:  Sensory and Motor conduction studies were done with surface recording electrodes. Limb temperature was maintained. EMG was done with a concentric needle electrode.     Results:  Sensory nerve conduction studies:  Antidromic studies bilaterally reveal normal amplitudes.  The left median nerve reveals a slowed conduction velocity all other nerves showed normal conduction velocities.  Singh studies were performed bilaterally.  The left median palmar study shows a prolonged distal latency.  Right side is considered normal.    Motor nerve conduction studies:  Right median motor study reveals normal distal latency, amplitude and conduction velocity.  Left median motor study reveals normal distal latency and normal amplitude at the wrist.  At the typical stimulation point of the median nerve at the elbow there is a sharp decrease in amplitude.  With stimulation over the ulnar nerve at the elbow but recording over the abductor pollicis brevis normal amplitude is noted.  Ulnar nerve studies bilaterally reveal normal distal latencies, amplitudes and conduction velocities.    Left median F wave is within normal limits.    Needle examination of the left upper extremity was normal except for slightly larger amplitude units in the first dorsal interosseous with no other abnormalities seen.    Interpretation:  The EMG is abnormal.  The findings are consistent with  the followin.  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  This suggests an anastomosis somewhere in the forearm.  2.  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  Clinical correlation is strongly advised in light of the anastomosis observed on motor studies.    Findings were discussed with the patient.  In light of the questionable median neuropathy at the wrist I have recommended a referral to hand surgery to discuss whether steroid injection at level of the carpal tunnel could be tried to see if the patient gets symptom relief.  In the meantime I would recommend he try some carpal tunnel wrist braces.  It is unusual to have such acute onset of symptoms and then only find a median neuropathy.  No other expiration for the patient's paresthesias has been found.      EMG Physician:  Guillermina Issa MD FAAN              Sensory NCS      Nerve / Sites Rec. Site Onset Peak Ref. NP Amp Ref. PP Amp Dist Eloy Ref. Temp     ms ms ms  V  V  V cm m/s m/s  C   R MEDIAN - Dig II Anti      Wrist Dig II 2.40 3.44  23.0 10.0 36.4 14 58.4 48.0 31.5   L MEDIAN - Dig II Anti      Wrist Dig II 3.54 4.43  16.8 10.0 29.3 14 39.5 48.0 32.5   R ULNAR - Dig V Anti      Wrist Dig V 2.34 2.97  18.5 8.0 34.1 12.5 53.3 48.0 31.5   L ULNAR - Dig V Anti      Wrist Dig V 2.45 3.28  16.8 8.0 27.5 12.5 51.1 48.0 32.5   L RADIAL - Snuff      Forearm Snuff 1.98 2.76  18.9 15.0 26.8 11 55.6 48.0 32.1   R MEDIAN - Ulnar - Palmar      Median Wrist 1.56 2.19 2.40 38.4  67.0 8 51.2  33.1      Ulnar Wrist 1.35 1.88 2.40 9.7  15.7 8 59.1  31.7   L MEDIAN - Ulnar - Palmar      Median Wrist 2.34 2.92 2.40 26.5  31.9 8 34.1  31.9      Ulnar Wrist 1.56 2.03 2.40 5.3  7.5 8 51.2  31.3       Motor NCS      Nerve / Sites Rec. Site Lat Ref. Amp Ref. Rel Amp Dist Eloy Ref. Dur. Area Temp.     ms ms mV mV % cm m/s m/s ms %  C   R MEDIAN - APB      Wrist  APB 3.75 4.40 10.6 5.0 100 8   7.55 100 34.9      Elbow APB 8.23  9.3  87.9 24 53.6 48.0 7.55 84.1 35   L MEDIAN - APB      Wrist APB 4.22 4.40 10.0 5.0 100 8   6.72 100 32.4      Median- Elbow APB 10.83  0.4  3.91 30 45.4 48.0 5.99 3.59 32      Ulnar Elbow- MG APB 10.52  10.8  108 34 54.0  6.25 89.8 31.3   R ULNAR - ADM      Wrist ADM 2.81 3.50 10.8 5.0 100 8   6.20 100 33.5      B.Elbow ADM 7.03  10.0  92.5 24 56.9 48.0 6.51 95.1 33.6      A.Elbow ADM 8.65  9.8  90.7 9 55.7 48.0 6.41 90 33.5   L ULNAR - ADM      Wrist ADM 3.18 3.50 10.9 5.0 100 8   6.67 100 30.4      B.Elbow ADM 7.29  10.4  95.6 23 55.9 48.0 6.93 102 30.6      A.Elbow ADM 8.91  10.2  93.5 9 55.7 48.0 6.82 96.4 30.6       F  Wave      Nerve Min F Lat Max F Lat Mean FLat Temp.    ms ms ms  C   L MEDIAN 31.30 33.59 32.13 31.1       EMG Summary Table     Spontaneous MUAP Recruitment    IA Fib/PSW Fasc H.F. Amp Dur. PPP Pattern   L. BICEPS N None None None N N None Normal   L. TRICEPS N None None None N N None Normal   L. FLEX CARPI RAD N None None None N N None Normal   L. FIRST D INTEROSS N None None None 1+ N None Normal   L. ABD POLL BREVIS N None None None N N None Normal

## 2018-10-11 ENCOUNTER — PRE VISIT (OUTPATIENT)
Dept: ORTHOPEDICS | Facility: CLINIC | Age: 48
End: 2018-10-11

## 2018-10-11 NOTE — TELEPHONE ENCOUNTER
FUTURE VISIT INFORMATION      FUTURE VISIT INFORMATION:    Date: 10/16    Time: 10:30    Location: [facility]  REFERRAL INFORMATION:    Referring provider:  Dr Issa    Referring providers clinic:  [facility]    Reason for visit/diagnosis  Left median nerve neuropathy    RECORDS REQUESTED FROM:       Clinic name Comments Records Status Imaging Status                                         RECORDS STATUS      All records and imaging in epic.

## 2018-10-13 ASSESSMENT — ENCOUNTER SYMPTOMS
SORE THROAT: 1
TINGLING: 1
NECK MASS: 0
TROUBLE SWALLOWING: 0
SEIZURES: 0
HYPOTENSION: 0
LEG PAIN: 0
LIGHT-HEADEDNESS: 1
PALPITATIONS: 0
EXERCISE INTOLERANCE: 0
SINUS PAIN: 1
WEAKNESS: 0
SLEEP DISTURBANCES DUE TO BREATHING: 0
SPEECH CHANGE: 0
SMELL DISTURBANCE: 0
PARALYSIS: 0
DISTURBANCES IN COORDINATION: 0
HEADACHES: 0
ORTHOPNEA: 0
NUMBNESS: 1
DIZZINESS: 0
HYPERTENSION: 0
SINUS CONGESTION: 0
TREMORS: 0
TASTE DISTURBANCE: 0
LOSS OF CONSCIOUSNESS: 0
MEMORY LOSS: 0
HOARSE VOICE: 1
SYNCOPE: 0

## 2018-10-16 ENCOUNTER — OFFICE VISIT (OUTPATIENT)
Dept: ORTHOPEDICS | Facility: CLINIC | Age: 48
End: 2018-10-16
Payer: COMMERCIAL

## 2018-10-16 VITALS — BODY MASS INDEX: 34.44 KG/M2 | WEIGHT: 277 LBS | HEIGHT: 75 IN

## 2018-10-16 DIAGNOSIS — G56.03 BILATERAL CARPAL TUNNEL SYNDROME: Primary | ICD-10-CM

## 2018-10-16 NOTE — MR AVS SNAPSHOT
After Visit Summary   10/16/2018    Lui Arrington    MRN: [id number]           Patient Information     Date Of Birth          1970        Visit Information        Provider Department      10/16/2018 10:30 AM Josias Mishra MD [facility]        Today's Diagnoses     Bilateral carpal tunnel syndrome    -  1       Follow-ups after your visit        Follow-up notes from your care team     Return in about 8 weeks (around 12/11/2018).      Your next 10 appointments already scheduled     Dec 11, 2018 10:30 AM CST   (Arrive by 10:15 AM)   NEW HAND with Josias Mishra MD   [facility])    [address] [place] [address]  [facility] [address] [phone]            Dec 11, 2018  1:00 PM CST   (Arrive by 12:45 PM)   New Patient Visit with Matt Finn MD   [facility] ([facility])    [address] [place] [address] [phone]              Who to contact     Please call your clinic at [phone] to:    Ask questions about your health    Make or cancel appointments    Discuss your medicines    Learn about your test results    Speak to your doctor            Additional Information About Your Visit        [employer] Information     [employer] gives you secure access to your electronic health record. If you see a primary care provider, you can also send messages to your care team and make appointments. If you have questions, please call your primary care clinic.  If you do not have a primary care provider, please call [phone] and they will assist you.      [employer] is an electronic gateway that provides easy, online access to your medical records. With [employer], you can request a clinic appointment, read your test results, renew a prescription or communicate with your care team.     To access your existing account, please contact your [facility]  [facility] or call [phone] for assistance.        Care EveryWhere ID     This is your Care EveryWhere ID. This could be used by other organizations to access your [place] medical records  [id number]        Your Vitals Were     Height BMI (Body Mass Index)                1.905 m (6' 3\") 34.62 kg/m2           Blood Pressure from Last 3 Encounters:   09/07/18 139/87   09/06/18 131/73   08/24/18 143/87    Weight from Last 3 Encounters:   10/16/18 125.6 kg (277 lb)   09/07/18 126 kg (277 lb 12.8 oz)   08/24/18 127.6 kg (281 lb 3.2 oz)              Today, you had the following     No orders found for display       Primary Care Provider Office Phone # Fax #    Richy Dorsey [phone]       [address] [address]        Equal Access to Services     [facility] : Hadii shannan Olson, waaxda lumitra, qaybta kaalmada adelillyyatrevor, beto watkins . So [facility] [phone].    ATENCIÓN: Si carlosla español, tiene a cortez disposición servicios gratuitos de asistencia lingüística. Llame al [phone].    We comply with applicable federal civil rights laws and [place] laws. We do not discriminate on the basis of race, color, national origin, age, disability, sex, sexual orientation, or gender identity.            Thank you!     [facility] you for choosing [facility]  for your care. Our goal is always to provide you with excellent care. Hearing back from our patients is one way we can continue to improve our services. Please take a few minutes to complete the written survey that you may receive in the mail after your visit with us. Thank you!             Your Updated Medication List - Protect others around you: Learn how to safely use, store and throw away your medicines at www.disposemymeds.org.          This list is accurate as of 10/16/18 11:32 AM.  Always use your most recent med list.                   Brand Name Dispense " Instructions for use Diagnosis    amLODIPine 2.5 MG tablet    NORVASC    90 tablet    Take 1 tablet (2.5 mg) by mouth daily    Essential hypertension       aspirin 81 MG EC tablet     90 tablet    Take 1 tablet (81 mg) by mouth daily    Chest pain, unspecified type       atorvastatin 40 MG tablet    LIPITOR    90 tablet    Take 1 tablet (40 mg) by mouth every evening    Chest pain, unspecified type       cetirizine 10 MG tablet    zyrTEC     Take 10 mg by mouth daily        lisinopril 5 MG tablet    PRINIVIL/ZESTRIL    30 tablet    Take 1 tablet (5 mg) by mouth daily    Benign essential hypertension       nitroGLYcerin 0.4 MG sublingual tablet    NITROSTAT    90 tablet    For chest pain place 1 tablet under the tongue every 5 minutes for 3 doses. If symptoms persist 5 minutes after 1st dose call 911.    Chest pain, unspecified type       SILDENAFIL CITRATE PO

## 2018-10-16 NOTE — PROGRESS NOTES
REFERRING PROVIDER: Guillermina Issa    REASON FOR CONSULTATION: Bilateral hand numbness and tingling.    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 Guillermina Issa for possible surgical management of bilateral hand numbness and tingling. His activities at work include typing and speaking. His hobbies include sailing and wall climbing with his children. His symptoms haven't going on for a few months now. They include nocturnal bilateral index, long, and ring finger numbness and tingling, left worse than right. He has recently tried splinting on the left side only and this has resulted in marked improvement in symptoms. He has not tried splinting on the right yet. His symptoms do not bother him at work. He denies any weakness. He has undergone nerve conduction studies in October 2018. If he has to undergo surgery, he may be interested in endoscopic release.    MEDS:   Prior to Admission medications    Medication Sig Start Date End Date Taking? Authorizing Provider   amLODIPine (NORVASC) 2.5 MG tablet Take 1 tablet (2.5 mg) by mouth daily 9/7/18   Jonathan Thacker MD   aspirin 81 MG EC tablet Take 1 tablet (81 mg) by mouth daily 9/7/18   Jonathan Thacker MD   atorvastatin (LIPITOR) 40 MG tablet Take 1 tablet (40 mg) by mouth every evening 9/6/18   Jonathan Thacker MD   cetirizine (ZYRTEC) 10 MG tablet Take 10 mg by mouth daily    Reported, Patient   lisinopril (PRINIVIL/ZESTRIL) 5 MG tablet Take 1 tablet (5 mg) by mouth daily 8/24/18   Richy Dorsey MD   nitroGLYcerin (NITROSTAT) 0.4 MG sublingual tablet For chest pain place 1 tablet under the tongue every 5 minutes for 3 doses. If symptoms persist 5 minutes after 1st dose call 911.  Patient not taking: Reported on 9/18/2018 9/6/18   Jonathan Thacker MD   SILDENAFIL CITRATE PO     Reported, Patient       ALLERGIES:   Allergies   Allergen Reactions      "Ciprofloxacin Hives     Erythromycin        PMH:   Past Medical History:   Diagnosis Date     Brain aneurysm     Small     Coronary artery disease      Diverticulosis      Seasonal allergies      Substance dependence, in remission (H) 1993       PSH:   Past Surgical History:   Procedure Laterality Date     COLONOSCOPY  8/4/2014    Procedure: COMBINED COLONOSCOPY, SINGLE BIOPSY/POLYPECTOMY BY BIOPSY;  Surgeon: Pb Celaya MD;  Location: [facility]     VASECTOMY         SH:   Social History   Substance Use Topics     Smoking status: Former Smoker     Quit date: 1/1/1998     Smokeless tobacco: Never Used     Alcohol use No       FH:   Family History   Problem Relation Age of Onset     Cancer Mother      Cancer - colorectal Mother      Hypertension Mother      Crohn Disease Mother      Cancer Father      Diabetes Maternal Grandmother      C.A.D. Maternal Grandmother      C.A.D. Paternal Grandfather      Hypertension Brother        ROS: Denies chest pain, shortness of breath, MI, CVA, diabetes, DVT, PE, and bleeding disorders.    PHYSICAL EXAMINATION: Ht 1.905 m (6' 3\")  Wt 125.6 kg (277 lb)  BMI 34.62 kg/m2  Gen.: No acute distress.  On examination of the left wrist, there is no thenar atrophy. Phalen's test is negative. There is no Tinel's sign at the volar wrist. There is no Tinel's the proximal volar forearm. Sensation to light touch is dull at the tips of the index, long, and ring fingers.  strength is normal. Patient is able to make a composite fist and extend all digits.  On examination of the right wrist, there is no thenar atrophy. Phalen's test is positive within 20 seconds. There is no Tinel's sign at the volar wrist. There is no Tinel's at the proximal volar forearm. Sensation to light touch is dull at the tips of the long and ring fingers.  strength is normal. Patient is able to make a composite fist and extend all digits.  There is no tenderness on palpation of the neck.    October 3, 2018 " nerve conduction studies with Dr. Issa:  Results:  Sensory nerve conduction studies:  Antidromic studies bilaterally reveal normal amplitudes.  The left median nerve reveals a slowed conduction velocity all other nerves showed normal conduction velocities.  Singh studies were performed bilaterally.  The left median palmar study shows a prolonged distal latency.  Right side is considered normal.     Motor nerve conduction studies:  Right median motor study reveals normal distal latency, amplitude and conduction velocity.  Left median motor study reveals normal distal latency and normal amplitude at the wrist.  At the typical stimulation point of the median nerve at the elbow there is a sharp decrease in amplitude.  With stimulation over the ulnar nerve at the elbow but recording over the abductor pollicis brevis normal amplitude is noted.  Ulnar nerve studies bilaterally reveal normal distal latencies, amplitudes and conduction velocities.     Left median F wave is within normal limits.     Needle examination of the left upper extremity was normal except for slightly larger amplitude units in the first dorsal interosseous with no other abnormalities seen.     Interpretation:  The EMG is abnormal.  The findings are consistent with the followin.  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  This suggests an anastomosis somewhere in the forearm.  2.  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  Clinical correlation is strongly advised in light of the anastomosis observed on motor studies.     Findings were discussed with the patient.  In light of the questionable median neuropathy at the wrist I have recommended a referral to hand surgery to discuss whether steroid injection at level of the carpal tunnel could be tried to see if the patient gets symptom relief.  In the meantime I would recommend he try  some carpal tunnel wrist braces.  It is unusual to have such acute onset of symptoms and then only find a median neuropathy.  No other expiration for the patient's paresthesias has been found.    ASSESSMENT: Bilateral carpal tunnel syndrome, left worse than right. There is no thenar atrophy at this point.    PLAN: Given that the patient has had marked improvement in symptoms on the left with splinting and has not tried splinting on the right yet, I recommended a trial of nocturnal wrist splinting bilaterally. Patient is to do this every night for the next 8 weeks before her next appointment together. If there is improvement in his sensory symptoms, we will continue with splinting to avoid surgery. If patient has persistent or worsening symptoms despite nocturnal splinting, I will then recommend ultrasound-guided steroid injection to bilateral carpal tunnels as a diagnostic measure to predict what the outcomes would be like with surgery. If patient is still interested an endoscopic release at that point, I will refer him to one of my partners who does that. All questions were answered today. I'll see the patient back in 6 to 8 weeks.    Total time spent with patient was 30 min of which greater than 50% was in counseling.    Answers for HPI/ROS submitted by the patient on 10/13/2018   General Symptoms: No  Skin Symptoms: No  HENT Symptoms: Yes  EYE SYMPTOMS: No  HEART SYMPTOMS: Yes  LUNG SYMPTOMS: No  INTESTINAL SYMPTOMS: No  URINARY SYMPTOMS: No  REPRODUCTIVE SYMPTOMS: No  SKELETAL SYMPTOMS: No  BLOOD SYMPTOMS: No  NERVOUS SYSTEM SYMPTOMS: Yes  MENTAL HEALTH SYMPTOMS: No  Ear pain: No  Ear discharge: No  Hearing loss: Yes  Tinnitus: No  Nosebleeds: No  Congestion: No  Sinus pain: Yes  Trouble swallowing: No   Voice hoarseness: Yes  Mouth sores: No  Sore throat: Yes  Tooth pain: No  Gum tenderness: No  Bleeding gums: No  Change in taste: No  Change in sense of smell: No  Dry mouth: No  Hearing aid used: No  Neck lump:  No  Chest pain or pressure: No  Fast or irregular heartbeat: No  Pain in legs with walking: No  Trouble breathing while lying down: No  Fingers or toes appear blue: No  High blood pressure: No  Low blood pressure: No  Fainting: No  Murmurs: No  Pacemaker: No  Varicose veins: No  Edema or swelling: No  Wake up at night with shortness of breath: No  Light-headedness: Yes  Exercise intolerance: No  Trouble with coordination: No  Dizziness or trouble with balance: No  Fainting or black-out spells: No  Memory loss: No  Headache: No  Seizures: No  Speech problems: No  Tingling: Yes  Tremor: No  Weakness: No  Difficulty walking: No  Paralysis: No  Numbness: Yes

## 2018-10-16 NOTE — NURSING NOTE
"Reason For Visit:   Chief Complaint   Patient presents with     Consult     The patient is here today with numbness, tingling and pain in his left and right hands. He reports his left is worse then the right. Has started wearing a brace at night.        Primary MD: Richy Dorsey  Ref. MD: self    Age: 48 year old    ?  No      Ht 1.905 m (6' 3\")  Wt 125.6 kg (277 lb)  BMI 34.62 kg/m2      Pain Assessment  Patient Currently in Pain: Yes  0-10 Pain Scale: 4  Primary Pain Location: Arm  Pain Orientation: Right, Left  Pain Descriptors: Aching, Numbness, Intermittent, Tingling  Aggravating Factors: Movement    Hand Dominance Evaluation  Hand Dominance: Right          QuickDASH Assessment  No flowsheet data found.       Current Outpatient Prescriptions   Medication Sig Dispense Refill     amLODIPine (NORVASC) 2.5 MG tablet Take 1 tablet (2.5 mg) by mouth daily 90 tablet 1     aspirin 81 MG EC tablet Take 1 tablet (81 mg) by mouth daily 90 tablet 3     atorvastatin (LIPITOR) 40 MG tablet Take 1 tablet (40 mg) by mouth every evening 90 tablet 3     cetirizine (ZYRTEC) 10 MG tablet Take 10 mg by mouth daily       lisinopril (PRINIVIL/ZESTRIL) 5 MG tablet Take 1 tablet (5 mg) by mouth daily 30 tablet 1     nitroGLYcerin (NITROSTAT) 0.4 MG sublingual tablet For chest pain place 1 tablet under the tongue every 5 minutes for 3 doses. If symptoms persist 5 minutes after 1st dose call 911. (Patient not taking: Reported on 9/18/2018) 90 tablet 1     SILDENAFIL CITRATE PO          Allergies   Allergen Reactions     Ciprofloxacin Hives     Erythromycin        Mikayla Orona, ATC  "

## 2018-10-16 NOTE — LETTER
10/16/2018       RE: Lui Arrington  [address] [place] [address]     Dear Colleague,    Thank you for referring your patient, Lui Arrington, to the [facility]. Please see a copy of my visit note below.    REFERRING PROVIDER: Guillermina Issa    REASON FOR CONSULTATION: Bilateral hand numbness and tingling.    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 Guillermina Issa for possible surgical management of bilateral hand numbness and tingling. His activities at work include typing and speaking. His hobbies include sailing and wall climbing with his children. His symptoms haven't going on for a few months now. They include nocturnal bilateral index, long, and ring finger numbness and tingling, left worse than right. He has recently tried splinting on the left side only and this has resulted in marked improvement in symptoms. He has not tried splinting on the right yet. His symptoms do not bother him at work. He denies any weakness. He has undergone nerve conduction studies in October 2018. If he has to undergo surgery, he may be interested in endoscopic release.    MEDS:   Prior to Admission medications    Medication Sig Start Date End Date Taking? Authorizing Provider   amLODIPine (NORVASC) 2.5 MG tablet Take 1 tablet (2.5 mg) by mouth daily 9/7/18   Jonathan Thacker MD   aspirin 81 MG EC tablet Take 1 tablet (81 mg) by mouth daily 9/7/18   Jonathan Thacker MD   atorvastatin (LIPITOR) 40 MG tablet Take 1 tablet (40 mg) by mouth every evening 9/6/18   Jonathan Thacker MD   cetirizine (ZYRTEC) 10 MG tablet Take 10 mg by mouth daily    Reported, Patient   lisinopril (PRINIVIL/ZESTRIL) 5 MG tablet Take 1 tablet (5 mg) by mouth daily 8/24/18   Richy Dorsey MD   nitroGLYcerin (NITROSTAT) 0.4 MG sublingual tablet For chest pain place 1 tablet  "under the tongue every 5 minutes for 3 doses. If symptoms persist 5 minutes after 1st dose call 911.  Patient not taking: Reported on 9/18/2018 9/6/18   Jonathan Thacker MD   SILDENAFIL CITRATE PO     Reported, Patient       ALLERGIES:   Allergies   Allergen Reactions     Ciprofloxacin Hives     Erythromycin        PMH:   Past Medical History:   Diagnosis Date     Brain aneurysm     Small     Coronary artery disease      Diverticulosis      Seasonal allergies      Substance dependence, in remission (H) 1993       PSH:   Past Surgical History:   Procedure Laterality Date     COLONOSCOPY  8/4/2014    Procedure: COMBINED COLONOSCOPY, SINGLE BIOPSY/POLYPECTOMY BY BIOPSY;  Surgeon: Pb Celaya MD;  Location: [facility]     VASECTOMY         SH:   Social History   Substance Use Topics     Smoking status: Former Smoker     Quit date: 1/1/1998     Smokeless tobacco: Never Used     Alcohol use No       FH:   Family History   Problem Relation Age of Onset     Cancer Mother      Cancer - colorectal Mother      Hypertension Mother      Crohn Disease Mother      Cancer Father      Diabetes Maternal Grandmother      C.A.D. Maternal Grandmother      C.A.D. Paternal Grandfather      Hypertension Brother        ROS: Denies chest pain, shortness of breath, MI, CVA, diabetes, DVT, PE, and bleeding disorders.    PHYSICAL EXAMINATION: Ht 1.905 m (6' 3\")  Wt 125.6 kg (277 lb)  BMI 34.62 kg/m2  Gen.: No acute distress.  On examination of the left wrist, there is no thenar atrophy. Phalen's test is negative. There is no Tinel's sign at the volar wrist. There is no Tinel's the proximal volar forearm. Sensation to light touch is dull at the tips of the index, long, and ring fingers.  strength is normal. Patient is able to make a composite fist and extend all digits.  On examination of the right wrist, there is no thenar atrophy. Phalen's test is positive within 20 seconds. There is no Tinel's sign at the volar wrist. " There is no Tinel's at the proximal volar forearm. Sensation to light touch is dull at the tips of the long and ring fingers.  strength is normal. Patient is able to make a composite fist and extend all digits.  There is no tenderness on palpation of the neck.    October 3, 2018 nerve conduction studies with Dr. Issa:  Results:  Sensory nerve conduction studies:  Antidromic studies bilaterally reveal normal amplitudes.  The left median nerve reveals a slowed conduction velocity all other nerves showed normal conduction velocities.  Singh studies were performed bilaterally.  The left median palmar study shows a prolonged distal latency.  Right side is considered normal.     Motor nerve conduction studies:  Right median motor study reveals normal distal latency, amplitude and conduction velocity.  Left median motor study reveals normal distal latency and normal amplitude at the wrist.  At the typical stimulation point of the median nerve at the elbow there is a sharp decrease in amplitude.  With stimulation over the ulnar nerve at the elbow but recording over the abductor pollicis brevis normal amplitude is noted.  Ulnar nerve studies bilaterally reveal normal distal latencies, amplitudes and conduction velocities.     Left median F wave is within normal limits.     Needle examination of the left upper extremity was normal except for slightly larger amplitude units in the first dorsal interosseous with no other abnormalities seen.     Interpretation:  The EMG is abnormal.  The findings are consistent with the followin.  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  This suggests an anastomosis somewhere in the forearm.  2.  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  Clinical correlation is strongly advised in light of the anastomosis observed on motor studies.     Findings were discussed with  the patient.  In light of the questionable median neuropathy at the wrist I have recommended a referral to hand surgery to discuss whether steroid injection at level of the carpal tunnel could be tried to see if the patient gets symptom relief.  In the meantime I would recommend he try some carpal tunnel wrist braces.  It is unusual to have such acute onset of symptoms and then only find a median neuropathy.  No other expiration for the patient's paresthesias has been found.    ASSESSMENT: Bilateral carpal tunnel syndrome, left worse than right. There is no thenar atrophy at this point.    PLAN: Given that the patient has had marked improvement in symptoms on the left with splinting and has not tried splinting on the right yet, I recommended a trial of nocturnal wrist splinting bilaterally. Patient is to do this every night for the next 8 weeks before her next appointment together. If there is improvement in his sensory symptoms, we will continue with splinting to avoid surgery. If patient has persistent or worsening symptoms despite nocturnal splinting, I will then recommend ultrasound-guided steroid injection to bilateral carpal tunnels as a diagnostic measure to predict what the outcomes would be like with surgery. If patient is still interested an endoscopic release at that point, I will refer him to one of my partners who does that. All questions were answered today. I'll see the patient back in 6 to 8 weeks.    Total time spent with patient was 30 min of which greater than 50% was in counseling.      Again, thank you for allowing me to participate in the care of your patient.      Sincerely,    Josias Mishra MD

## 2018-10-24 ENCOUNTER — MYC MEDICAL ADVICE (OUTPATIENT)
Dept: FAMILY MEDICINE | Facility: CLINIC | Age: 48
End: 2018-10-24

## 2018-10-24 DIAGNOSIS — I10 BENIGN ESSENTIAL HYPERTENSION: ICD-10-CM

## 2018-10-24 RX ORDER — LISINOPRIL 5 MG/1
5 TABLET ORAL DAILY
Qty: 30 TABLET | Refills: 1 | Status: SHIPPED | OUTPATIENT
Start: 2018-10-24 | End: 2018-10-25

## 2018-10-24 NOTE — TELEPHONE ENCOUNTER

## 2018-10-25 RX ORDER — LISINOPRIL 5 MG/1
5 TABLET ORAL DAILY
Qty: 30 TABLET | Refills: 1 | Status: SHIPPED | OUTPATIENT
Start: 2018-10-25 | End: 2019-01-23

## 2018-10-25 NOTE — TELEPHONE ENCOUNTER

## 2018-11-03 ASSESSMENT — ENCOUNTER SYMPTOMS
SEIZURES: 0
TINGLING: 1
WEAKNESS: 0
SORE THROAT: 0
NUMBNESS: 1
MEMORY LOSS: 0
DIZZINESS: 0
TASTE DISTURBANCE: 0
SINUS PAIN: 1
DISTURBANCES IN COORDINATION: 0
TREMORS: 0
SMELL DISTURBANCE: 0
PARALYSIS: 0
SINUS CONGESTION: 1
SPEECH CHANGE: 0
LOSS OF CONSCIOUSNESS: 0
HOARSE VOICE: 0
HEADACHES: 0
NECK MASS: 0
TROUBLE SWALLOWING: 0

## 2018-11-05 ENCOUNTER — OFFICE VISIT (OUTPATIENT)
Dept: CARDIOLOGY | Facility: CLINIC | Age: 48
End: 2018-11-05
Attending: INTERNAL MEDICINE
Payer: COMMERCIAL

## 2018-11-05 VITALS
HEIGHT: 75 IN | HEART RATE: 53 BPM | OXYGEN SATURATION: 100 % | BODY MASS INDEX: 34.32 KG/M2 | SYSTOLIC BLOOD PRESSURE: 139 MMHG | WEIGHT: 276 LBS | DIASTOLIC BLOOD PRESSURE: 87 MMHG

## 2018-11-05 DIAGNOSIS — I25.10 CORONARY ARTERY DISEASE INVOLVING NATIVE CORONARY ARTERY OF NATIVE HEART WITHOUT ANGINA PECTORIS: Primary | ICD-10-CM

## 2018-11-05 DIAGNOSIS — I10 BENIGN ESSENTIAL HYPERTENSION: ICD-10-CM

## 2018-11-05 PROCEDURE — 93010 ELECTROCARDIOGRAM REPORT: CPT | Mod: ZP | Performed by: INTERNAL MEDICINE

## 2018-11-05 PROCEDURE — 99214 OFFICE O/P EST MOD 30 MIN: CPT | Mod: GC | Performed by: INTERNAL MEDICINE

## 2018-11-05 PROCEDURE — 93005 ELECTROCARDIOGRAM TRACING: CPT | Mod: ZF

## 2018-11-05 PROCEDURE — G0463 HOSPITAL OUTPT CLINIC VISIT: HCPCS

## 2018-11-05 ASSESSMENT — PAIN SCALES - GENERAL: PAINLEVEL: NO PAIN (0)

## 2018-11-05 NOTE — LETTER
11/5/2018      RE: Lui Arrington  [address] [place] [address]       Dear Colleague,    Thank you for the opportunity to participate in the care of your patient, Lui Arrington, at the [facility] HEART [facility]. Please see a copy of my visit note below.    [facility]  CARDIOVASCULAR MEDICINE CLINIC NOTE    Referring Provider: Jonathan Woods*   Primary Care Provider: Richy Dorsey     Patient Name: Lui Arrington   MRN: [id number]     PERTINENT CLINICAL HISTORY:   Lui Arrington is a 48 year old male CAD, and small brain aneurysm who was recently admitted to [facility] for paresthesias found to have carpal tunnel syndrome. During hospitalization patient had an episode chest pain for which he ended up having an exercise stress echo and a coronary CT angiogram. The functional test did not reveal any ischemia but the CTA showed moderate-severe disease of the LAD.     Mr. Arrington reports exercising several days per week ranging from cross fit work outs to walking for 30min. He usually get his HR to 140 bpm with intense exercise. Denies any chest pain with activity though he feels winded with more intense exercise. No dyspnea or chest pain at rest. He reports no currently life limitations. Denies any COLON, leg swelling, PND, orthopnea, palpitations or syncope. He does report occasional dizzyness with changes in position.        PAST MEDICAL HISTORY:     Past Medical History:   Diagnosis Date     Brain aneurysm     Small     Coronary artery disease      Diverticulosis      Seasonal allergies      Substance dependence, in remission (H) 1993        PAST SURGICAL HISTORY:     Past Surgical History:   Procedure Laterality Date     COLONOSCOPY  8/4/2014    Procedure: COMBINED COLONOSCOPY, SINGLE BIOPSY/POLYPECTOMY BY BIOPSY;  Surgeon: Pb Celaya MD;  Location: [facility]     VASECTOMY          CURRENT MEDICATIONS:     Current  Outpatient Prescriptions   Medication Sig Dispense Refill     amLODIPine (NORVASC) 2.5 MG tablet Take 1 tablet (2.5 mg) by mouth daily 90 tablet 1     aspirin 81 MG EC tablet Take 1 tablet (81 mg) by mouth daily 90 tablet 3     atorvastatin (LIPITOR) 40 MG tablet Take 1 tablet (40 mg) by mouth every evening 90 tablet 3     cetirizine (ZYRTEC) 10 MG tablet Take 10 mg by mouth daily       lisinopril (PRINIVIL/ZESTRIL) 5 MG tablet Take 1 tablet (5 mg) by mouth daily 30 tablet 1     nitroGLYcerin (NITROSTAT) 0.4 MG sublingual tablet For chest pain place 1 tablet under the tongue every 5 minutes for 3 doses. If symptoms persist 5 minutes after 1st dose call 911. (Patient not taking: Reported on 9/18/2018) 90 tablet 1     SILDENAFIL CITRATE PO           ALLERGIES:     Allergies   Allergen Reactions     Ciprofloxacin Hives     Erythromycin         FAMILY HISTORY:     Family History   Problem Relation Age of Onset     Cancer Mother      Cancer - colorectal Mother      Hypertension Mother      Crohn Disease Mother      Cancer Father      Diabetes Maternal Grandmother      C.A.D. Maternal Grandmother      C.A.D. Paternal Grandfather      Hypertension Brother         SOCIAL HISTORY:     Social History     Social History     Marital status:      Spouse name: N/A     Number of children: N/A     Years of education: N/A     Social History Main Topics     Smoking status: Former Smoker     Quit date: 1/1/1998     Smokeless tobacco: Never Used     Alcohol use No     Drug use: No      Comment: in remission 1993, marijuana, ETOH, narcotic (no IV use)     Sexual activity: Yes     Partners: Female     Other Topics Concern     Not on file     Social History Narrative    Self-employed, ethics and leadership education. , has 4 yo son plus two children from previous marriage. Non-smoker. No alcohol use (abstains due to remote hx of alcoholism), no other drug use.      Lui  reports that he does not drink alcohol. and   reports that he quit smoking about 20 years ago. He has never used smokeless tobacco..     REVIEW OF SYSTEMS:   A comprehensive review of systems was performed and negative unless otherwise noted in the HPI above.      PHYSICAL EXAMINATION:   There were no vitals taken for this visit.  There is no height or weight on file to calculate BMI.  Wt Readings from Last 2 Encounters:   10/16/18 125.6 kg (277 lb)   09/07/18 126 kg (277 lb 12.8 oz)     Constitutional: no acute distress, pleasant and cooperative, appears overall well.  Eyes:sclera white, conjunctiva clear, without icterus or pallor   Ears/Nose/Mouth/Throat: Pinna, tragus, and external canal non-tender are normal, Nares patent b/l, moist mucous membranes  Cardiovascular: RRR nl S1S2, JVP not elevated, extremities with no edema or cyanosis  Respiratory: clear to auscultation and percussion bilaterally anterior and posterior  Gastrointestinal: soft, nontender, non distended, no hepatosplenomegaly or masses  Musculoskeletal: normal muscle bulk and tone, joints   Skin: normal skin appearance without worrisome lesions.   Neurologic: Alert and oriented, face symmetric, normal gait  Psychiatric: appropriate affect, eye contact, intact thought and speech       LABORATORY DATA:     LIPID RESULTS:  Recent Labs   Lab Test  08/24/18   1020   CHOL  141.9   HDL  42.0   LDL  78   TRIG  110.6   CHOLHDLRATIO  3.4        LIVER ENZYME RESULTS:  Recent Labs   Lab Test  09/03/18   1341 08/24/18   1020   AST  20  11.7   ALT  29  19.5       CBC RESULTS:  Recent Labs   Lab Test  09/03/18   1341  09/29/17   0929   06/02/14   1006   WBC  6.6   --    --   8.3   HGB  15.2  14.3   < >  14.3   HCT  45.6  44.9   < >  43.2   PLT  149*   --    --   172    < > = values in this interval not displayed.       BMP RESULTS:  Recent Labs   Lab Test  09/03/18   1341  08/24/18   1020  06/02/14   1006   NA  139  141.3  140   POTASSIUM  4.0  5.2*  4.3   CHLORIDE  106  106.5  105   CO2  27  28.5  24    ANIONGAP  6   --   12   GLC  93  101.5*  84   BUN  15  15.7  6   CR  0.86  0.8  0.82   MARIO  8.8  9.2  8.6       A1C RESULTS:  Lab Results   Component Value Date    A1C 5.0 08/24/2018       INR RESULTS:  Recent Labs   Lab Test  09/03/18   1341   INR  0.92          PROCEDURES & FURTHER ASSESSMENTS:     EKG: Sinus bradycardia at 51bpm    ECHO:   Exercise Stress Echo  09/04/2018  Interpretation Summary  Near maximal stress test, achieved 81% of the age predicted maximal heart  rate. Limiting symptom fatigue.     Hypertensive blood pressure response to exercise.  No angina symptoms with exercise.  1 mm ST segment depression in the inferior and lateral leads that normalize to  baseline 2 minutes into recovery.  Normal segmental and global LV function with EF of approximately 55-60% at  rest; with exercise left ventricular ventricular cavity size decreases and  LVEF increases to 65-70%. No stress induced regional wall motion  abnormalities.  Average functional capacity for age.  Normal aortic root and no significant valvular dysfunction noted on screening  2D and Doppler examination.     CTA coronary:    IMPRESSION:  1.  High plaque burden with serial stenosis in the proximal to mid LAD  that are moderate-severe in severity. Recommend further evaluation  with invasive angiogram. Results conveyed to team. Minimal disease in  the RCA and circumflex.  2.  Total Agatston score 189 placing the patient in the 95th  percentile when compared to age and gender matched control group.  3.  Please review Radiology report for incidental noncardiac findings  that will follow separately.     FINDINGS:     CORONARY CALCIUM SCORE     Total Agatston calcium score: 189        CLINICAL IMPRESSION:     Liu Arrington is a 48 year old male HTN and carpal tunnel syndrome with recently diagnosed CAD (mod disease on CTA). Patient had an episode of non exertional chest pain while in the hospital which lead to the non-invasive testing which  ultimately lead to the discovery of CAD. He since does not report any symptoms consistent with angina. Given lack symptoms pursuing invasive coronary angiography for the goal revascularization is of limited use though PCI could decrease probability of MI based FAME2 extended follow up. After discussion with Mr. Arrington, of the risk and benefits of invasive angiography for his current clinical scenario we have decided to pursue medical management for now with plan for invasive diagnotic if symptoms develop.     PLAN:  -- Continue ASA 81mg daily and atorvastatin 40mg daily   -- Continue amlodipine and lisinopril  -- Discontinue SLN, as patient would like use Sildenafil for ED  -- Patient to follow with PCP for BP optimization    Follow-up: 3 year or PRN    Thank you for allowing us to take part in the care of this very pleasant patient.  Please do not hesitate to call if any further questions or concerns arise.    Patient seen and discussed with Dr. Morales.     Daniel Vides MD  Cardiology Fellow    November 5, 2018      ATTENDING ATTESTATION:   I personally examined and evaluated this patient on November 5, 2018.   I have reviewed today's vital signs, medications, labs, and imaging results. I have reviewed and edited, as necessary, the history, review of systems, physical examination, and assessment and plan. I discussed the patient with the fellow and agree with the assessment and plan of care as documented in the note above.    Thank you for allowing us to take part in the care of this very pleasant patient.  Please do not hesitate to call if any further questions or concerns arise.    Fracisco Morales MD, PhD  Interventional/Critical Care Cardiology  [phone]    November 5, 2018      CC  Patient Care Team:  Richy Dorsey MD as PCP - General (Family Practice)  Josias Mishra MD as MD (Plastic Surgery)  Fracisco Morales MD as MD (Cardiology)  DARON POLLACK

## 2018-11-05 NOTE — MR AVS SNAPSHOT
After Visit Summary   11/5/2018    Lui Arrington    MRN: [id number]           Patient Information     Date Of Birth          1970        Visit Information        Provider Department      11/5/2018 4:00 PM Fracisco Morales MD [facility]        Today's Diagnoses     Coronary artery disease involving native coronary artery of native heart without angina pectoris    -  1      Care Instructions    Patient Instructions:  It was a pleasure to see you in the cardiology clinic today.      If you have any questions, call  Maggie Valencia RN, at [phone].  Press Option #1 for the [facility], and then press Option #3 for nursing.  We are encouraging the use of [employer] to communicate with your HealthCare Provider    Note the new medications: none  Stop the following medications: none    The results from today include: none  Please follow up with cardiology as needed      If you have an urgent need after hours (8:00 am to 4:30 pm) please call [phone] and ask for the cardiology fellow on call.            Follow-ups after your visit        Your next 10 appointments already scheduled     Dec 11, 2018 10:30 AM CST   (Arrive by 10:15 AM)   NEW HAND with Josias Mishra MD   [facility] ([facility])    [address] [place] [id number] [phone]            Dec 11, 2018  1:00 PM CST   (Arrive by 12:45 PM)   New Patient Visit with Matt Finn MD   [facility] ([facility])    [address] [place] [id number] [phone]              Who to contact     If you have questions or need follow up information about today's clinic visit or your schedule please contact [facility] directly at [phone].  Normal or non-critical lab and imaging results will be communicated to you by MyChart, letter or phone within 4 business days  "after the clinic has received the results. If you do not hear from us within 7 days, please contact the clinic through [employer] or phone. If you have a critical or abnormal lab result, we will notify you by phone as soon as possible.  Submit refill requests through [employer] or call your pharmacy and they will forward the refill request to us. Please allow 3 business days for your refill to be completed.          Additional Information About Your Visit        [employer] Information     [employer] gives you secure access to your electronic health record. If you see a primary care provider, you can also send messages to your care team and make appointments. If you have questions, please call your primary care clinic.  If you do not have a primary care provider, please call [phone] and they will assist you.        Care EveryWhere ID     This is your Care EveryWhere ID. This could be used by other organizations to access your [place] medical records  [id number]        Your Vitals Were     Pulse Height Pulse Oximetry BMI (Body Mass Index)          53 1.905 m (6' 3\") 100% 34.5 kg/m2         Blood Pressure from Last 3 Encounters:   11/05/18 139/87   09/07/18 139/87   09/06/18 131/73    Weight from Last 3 Encounters:   11/05/18 125.2 kg (276 lb)   10/16/18 125.6 kg (277 lb)   09/07/18 126 kg (277 lb 12.8 oz)              We Performed the Following     EKG 12-lead, tracing only (Same Day)        Primary Care Provider Office Phone # Fax #    Richy Dorsey [phone]       [address] [address]        Equal Access to Services     [facility]: Hadii aad lupillo hadasho Sodeejayali, waaxda luqadaha, qaybta kaalmada beto contreras. So [facility] [phone].    ATENCIÓN: Si habla español, tiene a cortez disposición servicios gratuitos de asistencia lingüística. Llame al [phone].    We comply with applicable federal civil rights laws and [place] laws. We " do not discriminate on the basis of race, color, national origin, age, disability, sex, sexual orientation, or gender identity.            Thank you!     Thank you for choosing [facility]  for your care. Our goal is always to provide you with excellent care. Hearing back from our patients is one way we can continue to improve our services. Please take a few minutes to complete the written survey that you may receive in the mail after your visit with us. Thank you!             Your Updated Medication List - Protect others around you: Learn how to safely use, store and throw away your medicines at www.disposemymeds.org.          This list is accurate as of 11/5/18  4:42 PM.  Always use your most recent med list.                   Brand Name Dispense Instructions for use Diagnosis    amLODIPine 2.5 MG tablet    NORVASC    90 tablet    Take 1 tablet (2.5 mg) by mouth daily    Essential hypertension       aspirin 81 MG EC tablet     90 tablet    Take 1 tablet (81 mg) by mouth daily    Chest pain, unspecified type       atorvastatin 40 MG tablet    LIPITOR    90 tablet    Take 1 tablet (40 mg) by mouth every evening    Chest pain, unspecified type       cetirizine 10 MG tablet    zyrTEC     Take 10 mg by mouth daily        lisinopril 5 MG tablet    PRINIVIL/ZESTRIL    30 tablet    Take 1 tablet (5 mg) by mouth daily    Benign essential hypertension       nitroGLYcerin 0.4 MG sublingual tablet    NITROSTAT    90 tablet    For chest pain place 1 tablet under the tongue every 5 minutes for 3 doses. If symptoms persist 5 minutes after 1st dose call 911.    Chest pain, unspecified type       SILDENAFIL CITRATE PO

## 2018-11-05 NOTE — NURSING NOTE
Vitals completed successfully and medication reconciled. EKG done. Christina Brock MA  Chief Complaint   Patient presents with     New Patient     referral from Dr. Rob Carmona

## 2018-11-05 NOTE — PATIENT INSTRUCTIONS
Patient Instructions:  It was a pleasure to see you in the cardiology clinic today.      If you have any questions, call  Maggie Valencia RN, at [phone].  Press Option #1 for the [facility] [place], and then press Option #3 for nursing.  We are encouraging the use of [employer] to communicate with your HealthCare Provider    Note the new medications: none  Stop the following medications: none    The results from today include: none  Please follow up with cardiology as needed      If you have an urgent need after hours (8:00 am to 4:30 pm) please call [phone] and ask for the cardiology fellow on call.

## 2018-11-05 NOTE — PROGRESS NOTES
[facility]  CARDIOVASCULAR MEDICINE CLINIC NOTE    Referring Provider: Jonathan Woods*   Primary Care Provider: Richy Dorsey     Patient Name: Lui Arrington   MRN: [id number]     PERTINENT CLINICAL HISTORY:   Lui Arrington is a 48 year old male CAD, and small brain aneurysm who was recently admitted to [facility] for paresthesias found to have carpal tunnel syndrome. During hospitalization patient had an episode chest pain for which he ended up having an exercise stress echo and a coronary CT angiogram. The functional test did not reveal any ischemia but the CTA showed moderate-severe disease of the LAD.     Mr. Arrington reports exercising several days per week ranging from cross fit work outs to walking for 30min. He usually get his HR to 140 bpm with intense exercise. Denies any chest pain with activity though he feels winded with more intense exercise. No dyspnea or chest pain at rest. He reports no currently life limitations. Denies any COLON, leg swelling, PND, orthopnea, palpitations or syncope. He does report occasional dizzyness with changes in position.        PAST MEDICAL HISTORY:     Past Medical History:   Diagnosis Date     Brain aneurysm     Small     Coronary artery disease      Diverticulosis      Seasonal allergies      Substance dependence, in remission (H) 1993        PAST SURGICAL HISTORY:     Past Surgical History:   Procedure Laterality Date     COLONOSCOPY  8/4/2014    Procedure: COMBINED COLONOSCOPY, SINGLE BIOPSY/POLYPECTOMY BY BIOPSY;  Surgeon: Pb Celaya MD;  Location:  GI     VASECTOMY          CURRENT MEDICATIONS:     Current Outpatient Prescriptions   Medication Sig Dispense Refill     amLODIPine (NORVASC) 2.5 MG tablet Take 1 tablet (2.5 mg) by mouth daily 90 tablet 1     aspirin 81 MG EC tablet Take 1 tablet (81 mg) by mouth daily 90 tablet 3     atorvastatin (LIPITOR) 40 MG tablet Take 1 tablet (40 mg) by mouth every evening 90 tablet 3      cetirizine (ZYRTEC) 10 MG tablet Take 10 mg by mouth daily       lisinopril (PRINIVIL/ZESTRIL) 5 MG tablet Take 1 tablet (5 mg) by mouth daily 30 tablet 1     nitroGLYcerin (NITROSTAT) 0.4 MG sublingual tablet For chest pain place 1 tablet under the tongue every 5 minutes for 3 doses. If symptoms persist 5 minutes after 1st dose call 911. (Patient not taking: Reported on 9/18/2018) 90 tablet 1     SILDENAFIL CITRATE PO           ALLERGIES:     Allergies   Allergen Reactions     Ciprofloxacin Hives     Erythromycin         FAMILY HISTORY:     Family History   Problem Relation Age of Onset     Cancer Mother      Cancer - colorectal Mother      Hypertension Mother      Crohn Disease Mother      Cancer Father      Diabetes Maternal Grandmother      C.A.D. Maternal Grandmother      C.A.D. Paternal Grandfather      Hypertension Brother         SOCIAL HISTORY:     Social History     Social History     Marital status:      Spouse name: N/A     Number of children: N/A     Years of education: N/A     Social History Main Topics     Smoking status: Former Smoker     Quit date: 1/1/1998     Smokeless tobacco: Never Used     Alcohol use No     Drug use: No      Comment: in remission 1993, marijuana, ETOH, narcotic (no IV use)     Sexual activity: Yes     Partners: Female     Other Topics Concern     Not on file     Social History Narrative    Self-employed, ethics and leadership education. , has 4 yo son plus two children from previous marriage. Non-smoker. No alcohol use (abstains due to remote hx of alcoholism), no other drug use.      Lui  reports that he does not drink alcohol. and  reports that he quit smoking about 20 years ago. He has never used smokeless tobacco..     REVIEW OF SYSTEMS:   A comprehensive review of systems was performed and negative unless otherwise noted in the HPI above.      PHYSICAL EXAMINATION:   There were no vitals taken for this visit.  There is no height or weight on file to  calculate BMI.  Wt Readings from Last 2 Encounters:   10/16/18 125.6 kg (277 lb)   09/07/18 126 kg (277 lb 12.8 oz)     Constitutional: no acute distress, pleasant and cooperative, appears overall well.  Eyes:sclera white, conjunctiva clear, without icterus or pallor   Ears/Nose/Mouth/Throat: Pinna, tragus, and external canal non-tender are normal, Nares patent b/l, moist mucous membranes  Cardiovascular: RRR nl S1S2, JVP not elevated, extremities with no edema or cyanosis  Respiratory: clear to auscultation and percussion bilaterally anterior and posterior  Gastrointestinal: soft, nontender, non distended, no hepatosplenomegaly or masses  Musculoskeletal: normal muscle bulk and tone, joints   Skin: normal skin appearance without worrisome lesions.   Neurologic: Alert and oriented, face symmetric, normal gait  Psychiatric: appropriate affect, eye contact, intact thought and speech       LABORATORY DATA:     LIPID RESULTS:  Recent Labs   Lab Test  08/24/18   1020   CHOL  141.9   HDL  42.0   LDL  78   TRIG  110.6   CHOLHDLRATIO  3.4        LIVER ENZYME RESULTS:  Recent Labs   Lab Test  09/03/18   1341  08/24/18   1020   AST  20  11.7   ALT  29  19.5       CBC RESULTS:  Recent Labs   Lab Test  09/03/18   1341  09/29/17   0929   06/02/14   1006   WBC  6.6   --    --   8.3   HGB  15.2  14.3   < >  14.3   HCT  45.6  44.9   < >  43.2   PLT  149*   --    --   172    < > = values in this interval not displayed.       BMP RESULTS:  Recent Labs   Lab Test  09/03/18   1341  08/24/18   1020  06/02/14   1006   NA  139  141.3  140   POTASSIUM  4.0  5.2*  4.3   CHLORIDE  106  106.5  105   CO2  27  28.5  24   ANIONGAP  6   --   12   GLC  93  101.5*  84   BUN  15  15.7  6   CR  0.86  0.8  0.82   MARIO  8.8  9.2  8.6       A1C RESULTS:  Lab Results   Component Value Date    A1C 5.0 08/24/2018       INR RESULTS:  Recent Labs   Lab Test  09/03/18   1341   INR  0.92          PROCEDURES & FURTHER ASSESSMENTS:     EKG: Sinus bradycardia at  51bpm    ECHO:   Exercise Stress Echo  09/04/2018  Interpretation Summary  Near maximal stress test, achieved 81% of the age predicted maximal heart  rate. Limiting symptom fatigue.     Hypertensive blood pressure response to exercise.  No angina symptoms with exercise.  1 mm ST segment depression in the inferior and lateral leads that normalize to  baseline 2 minutes into recovery.  Normal segmental and global LV function with EF of approximately 55-60% at  rest; with exercise left ventricular ventricular cavity size decreases and  LVEF increases to 65-70%. No stress induced regional wall motion  abnormalities.  Average functional capacity for age.  Normal aortic root and no significant valvular dysfunction noted on screening  2D and Doppler examination.     CTA coronary:    IMPRESSION:  1.  High plaque burden with serial stenosis in the proximal to mid LAD  that are moderate-severe in severity. Recommend further evaluation  with invasive angiogram. Results conveyed to team. Minimal disease in  the RCA and circumflex.  2.  Total Agatston score 189 placing the patient in the 95th  percentile when compared to age and gender matched control group.  3.  Please review Radiology report for incidental noncardiac findings  that will follow separately.     FINDINGS:     CORONARY CALCIUM SCORE     Total Agatston calcium score: 189        CLINICAL IMPRESSION:     Lui Arrington is a 48 year old male HTN and carpal tunnel syndrome with recently diagnosed CAD (mod disease on CTA). Patient had an episode of non exertional chest pain while in the hospital which lead to the non-invasive testing which ultimately lead to the discovery of CAD. He since does not report any symptoms consistent with angina. Given lack symptoms pursuing invasive coronary angiography for the goal revascularization is of limited use though PCI could decrease probability of MI based FAME2 extended follow up. After discussion with Mr. Arrington, of the  risk and benefits of invasive angiography for his current clinical scenario we have decided to pursue medical management for now with plan for invasive diagnotic if symptoms develop.     PLAN:  -- Continue ASA 81mg daily and atorvastatin 40mg daily   -- Continue amlodipine and lisinopril  -- Discontinue SLN, as patient would like use Sildenafil for ED  -- Patient to follow with PCP for BP optimization    Follow-up: 3 year or PRN    Thank you for allowing us to take part in the care of this very pleasant patient.  Please do not hesitate to call if any further questions or concerns arise.    Patient seen and discussed with Dr. Morales.     Daniel Vides MD  Cardiology Fellow    November 5, 2018      ATTENDING ATTESTATION:   I personally examined and evaluated this patient on November 5, 2018.   I have reviewed today's vital signs, medications, labs, and imaging results. I have reviewed and edited, as necessary, the history, review of systems, physical examination, and assessment and plan. I discussed the patient with the fellow and agree with the assessment and plan of care as documented in the note above.    Thank you for allowing us to take part in the care of this very pleasant patient.  Please do not hesitate to call if any further questions or concerns arise.    Fracisco Morales MD, PhD  Interventional/Critical Care Cardiology  [phone]    November 5, 2018      CC  Patient Care Team:  Richy Dorsey MD as PCP - General (Family Practice)  Josias Mishra MD as MD (Plastic Surgery)  Fracisco Morales MD as MD (Cardiology)  DARON POLLACK

## 2018-11-06 LAB — INTERPRETATION ECG - MUSE: NORMAL

## 2018-12-06 NOTE — TELEPHONE ENCOUNTER
FUTURE VISIT INFORMATION      FUTURE VISIT INFORMATION:    Date: 12/11/18    Time:     [place] [facility] [place] INFORMATION:    Referring provider:      Referring providers clinic:    Reason for visit/diagnosis     bilateral carpal tunnel US guided injections      Made On:   10/16/2018 11:31 AM             RECORDS REQUESTED FROM:       Clinic name Comments Records Status Imaging Status     internal internal

## 2018-12-11 ENCOUNTER — PRE VISIT (OUTPATIENT)
Dept: ORTHOPEDICS | Facility: CLINIC | Age: 48
End: 2018-12-11

## 2018-12-11 ENCOUNTER — OFFICE VISIT (OUTPATIENT)
Dept: ORTHOPEDICS | Facility: CLINIC | Age: 48
End: 2018-12-11
Payer: COMMERCIAL

## 2018-12-11 DIAGNOSIS — G56.03 BILATERAL CARPAL TUNNEL SYNDROME: Primary | ICD-10-CM

## 2018-12-11 NOTE — PROGRESS NOTES
Patient returns for a follow-up visit regarding bilateral carpal tunnel syndrome.    INTERVAL HISTORY: Patient reports significant improvement in nocturnal sensory symptoms with bilateral wrist splints worn at nighttime.  He reports that his symptoms have reduced to a point where they are tolerable.  Still has some ulnar-sided finger sensory symptoms.  Would like to avoid surgery at this point.    PHYSICAL EXAMINATION:  General: In no acute distress.  On examination of bilateral hands, there is no thenar atrophy.  No Tinel's or Phalen's at the wrist bilaterally.  However, there is a positive elbow flexion and ulnar nerve compression test on the left.  No snapping or subluxation of the ulnar nerve at the elbow.    ASSESSMENT: Improved carpal tunnel syndrome symptoms bilaterally with nocturnal splinting.  Concern for left cubital tunnel syndrome.    PLAN: I encouraged patient to continue wearing nighttime wrist splinting until symptoms completely resolve.  We will also give an elbow splint today to see if we can reduce the ulnar-sided symptoms.  Patient may return to see me as needed if symptoms persist or worsen.  At this point, we will avoid surgical intervention.    Total time spent with patient was 15 min of which greater than 50% was in counseling.

## 2018-12-11 NOTE — LETTER
12/11/2018       RE: Lui Arrington  [address] [place] [address]     Dear Colleague,    Thank you for referring your patient, Lui Arrington, [facility]. Please see a copy of my visit note below.    Patient returns for a follow-up visit regarding bilateral carpal tunnel syndrome.    INTERVAL HISTORY: Patient reports significant improvement in nocturnal sensory symptoms with bilateral wrist splints worn at nighttime.  He reports that his symptoms have reduced to a point where they are tolerable.  Still has some ulnar-sided finger sensory symptoms.  Would like to avoid surgery at this point.    PHYSICAL EXAMINATION:  General: In no acute distress.  On examination of bilateral hands, there is no thenar atrophy.  No Tinel's or Phalen's at the wrist bilaterally.  However, there is a positive elbow flexion and ulnar nerve compression test on the left.  No snapping or subluxation of the ulnar nerve at the elbow.    ASSESSMENT: Improved carpal tunnel syndrome symptoms bilaterally with nocturnal splinting.  Concern for left cubital tunnel syndrome.    PLAN: I encouraged patient to continue wearing nighttime wrist splinting until symptoms completely resolve.  We will also give an elbow splint today to see if we can reduce the ulnar-sided symptoms.  Patient may return to see me as needed if symptoms persist or worsen.  At this point, we will avoid surgical intervention.    Total time spent with patient was 15 min of which greater than 50% was in counseling.    Again, thank you for allowing me to participate in the care of your patient.      Sincerely,    Josias Mishra MD

## 2018-12-11 NOTE — NURSING NOTE
Reason For Visit:   Chief Complaint   Patient presents with     Left Wrist - Pain, RECHECK     Right Wrist - Pain, RECHECK       Primary MD: Richy Dorsey  Ref. MD: Self Referred    Age: 48 year old    ?  No      There were no vitals taken for this visit.      Pain Assessment  Patient Currently in Pain: No               QuickDASH Assessment  No flowsheet data found.       Current Outpatient Medications   Medication Sig Dispense Refill     amLODIPine (NORVASC) 2.5 MG tablet Take 1 tablet (2.5 mg) by mouth daily 90 tablet 1     aspirin 81 MG EC tablet Take 1 tablet (81 mg) by mouth daily 90 tablet 3     atorvastatin (LIPITOR) 40 MG tablet Take 1 tablet (40 mg) by mouth every evening 90 tablet 3     cetirizine (ZYRTEC) 10 MG tablet Take 10 mg by mouth daily       lisinopril (PRINIVIL/ZESTRIL) 5 MG tablet Take 1 tablet (5 mg) by mouth daily 30 tablet 1     SILDENAFIL CITRATE PO Take by mouth as needed        nitroGLYcerin (NITROSTAT) 0.4 MG sublingual tablet For chest pain place 1 tablet under the tongue every 5 minutes for 3 doses. If symptoms persist 5 minutes after 1st dose call 911. (Patient not taking: Reported on 12/11/2018) 90 tablet 1       Allergies   Allergen Reactions     Ciprofloxacin Hives     Erythromycin      Seasonal Allergies        Ale Monroe, ATC

## 2019-01-23 DIAGNOSIS — I10 BENIGN ESSENTIAL HYPERTENSION: ICD-10-CM

## 2019-01-23 RX ORDER — LISINOPRIL 5 MG/1
5 TABLET ORAL DAILY
Qty: 30 TABLET | Refills: 1 | Status: SHIPPED | OUTPATIENT
Start: 2019-01-23 | End: 2019-03-28

## 2019-01-23 NOTE — TELEPHONE ENCOUNTER

## 2019-02-19 ENCOUNTER — OFFICE VISIT (OUTPATIENT)
Dept: ORTHOPEDICS | Facility: CLINIC | Age: 49
End: 2019-02-19
Payer: COMMERCIAL

## 2019-02-19 VITALS — WEIGHT: 278 LBS | BODY MASS INDEX: 34.57 KG/M2 | HEIGHT: 75 IN

## 2019-02-19 DIAGNOSIS — G56.23 CUBITAL TUNNEL SYNDROME, BILATERAL: Primary | ICD-10-CM

## 2019-02-19 ASSESSMENT — MIFFLIN-ST. JEOR: SCORE: 2201.63

## 2019-02-19 NOTE — LETTER
2/19/2019       RE: Lui Arrington  [address] [place] [address]     Dear Colleague,    Thank you for referring your patient, Lui Arrington, to the [facility]. Please see a copy of my visit note below.    Patient returns for a follow-up visit regarding bilateral carpal tunnel syndrome.    INTERVAL HISTORY: Patient's bilateral carpal tunnel syndrome symptoms are completely resolved with splinting.  However, he is interested in perhaps coming out of the splints.  He also reports numbness and tingling in the ring and small finger distribution with resting the elbow on any type of surface even if it is soft.  He denies any weakness in his intrinsic muscles.    PHYSICAL EXAMINATION:  General: In no acute distress.  On examination of bilateral hands, there is no thenar atrophy.  No Tinel's or Phalen's at the wrist bilaterally.  However, there is a positive elbow flexion and ulnar nerve compression test bilaterally.  No snapping or subluxation of the ulnar nerve at the elbow.    ASSESSMENT: Bilateral cubital and carpal tunnel syndrome, responsive to conservative management.    PLAN: Patient is highly motivated to avoid surgery.  I recommend bilateral ultrasound-guided steroid injection to the carpal tunnel.  He is to discontinue splinting following this injection to see how well it works.  I am referring him to hand therapy for bilateral ulnar nerve glide and stretching exercises.  We did have a short discussion regarding ulnar nerve surgery.  I would recommend decompression and submuscular transposition on both sides given that his symptoms coincide with resting the ulnar nerve on even soft surfaces.  He is not willing to accept the associated risks at this point.  I will see him back in 3 months to track his progress.    Total time spent with patient was 15 min of which greater than 50% was in counseling.    Again, thank you for  allowing me to participate in the care of your patient.      Sincerely,    Josias Mishra MD

## 2019-02-19 NOTE — NURSING NOTE
"Reason For Visit:   Chief Complaint   Patient presents with     Right Hand - RECHECK     Left Hand - RECHECK     RECHECK     median and ulnar neuropathy        Primary MD: Richy Dorsey  Ref. MD: laurence    Age: 49 year old    ?  No      Ht 1.905 m (6' 3\")   Wt 126.1 kg (278 lb)   BMI 34.75 kg/m        Pain Assessment  Patient Currently in Pain: Yes  0-10 Pain Scale: 2  Pain Descriptors: Tingling(weakness)               QuickDASH Assessment  No flowsheet data found.       Current Outpatient Medications   Medication Sig Dispense Refill     amLODIPine (NORVASC) 2.5 MG tablet Take 1 tablet (2.5 mg) by mouth daily 90 tablet 1     aspirin 81 MG EC tablet Take 1 tablet (81 mg) by mouth daily 90 tablet 3     atorvastatin (LIPITOR) 40 MG tablet Take 1 tablet (40 mg) by mouth every evening 90 tablet 3     cetirizine (ZYRTEC) 10 MG tablet Take 10 mg by mouth daily       Fexofenadine HCl (ALLEGRA PO) Take 30 mg by mouth daily       lisinopril (PRINIVIL/ZESTRIL) 5 MG tablet Take 1 tablet (5 mg) by mouth daily 30 tablet 1     SILDENAFIL CITRATE PO Take by mouth as needed        nitroGLYcerin (NITROSTAT) 0.4 MG sublingual tablet For chest pain place 1 tablet under the tongue every 5 minutes for 3 doses. If symptoms persist 5 minutes after 1st dose call 911. (Patient not taking: Reported on 12/11/2018) 90 tablet 1       Allergies   Allergen Reactions     Ciprofloxacin Hives     Erythromycin      Seasonal Allergies        Ale Monroe, ATC    "

## 2019-02-19 NOTE — PROGRESS NOTES
Patient returns for a follow-up visit regarding bilateral carpal tunnel syndrome.    INTERVAL HISTORY: Patient's bilateral carpal tunnel syndrome symptoms are completely resolved with splinting.  However, he is interested in perhaps coming out of the splints.  He also reports numbness and tingling in the ring and small finger distribution with resting the elbow on any type of surface even if it is soft.  He denies any weakness in his intrinsic muscles.    PHYSICAL EXAMINATION:  General: In no acute distress.  On examination of bilateral hands, there is no thenar atrophy.  No Tinel's or Phalen's at the wrist bilaterally.  However, there is a positive elbow flexion and ulnar nerve compression test bilaterally.  No snapping or subluxation of the ulnar nerve at the elbow.    ASSESSMENT: Bilateral cubital and carpal tunnel syndrome, responsive to conservative management.    PLAN: Patient is highly motivated to avoid surgery.  I recommend bilateral ultrasound-guided steroid injection to the carpal tunnel.  He is to discontinue splinting following this injection to see how well it works.  I am referring him to hand therapy for bilateral ulnar nerve glide and stretching exercises.  We did have a short discussion regarding ulnar nerve surgery.  I would recommend decompression and submuscular transposition on both sides given that his symptoms coincide with resting the ulnar nerve on even soft surfaces.  He is not willing to accept the associated risks at this point.  I will see him back in 3 months to track his progress.    Total time spent with patient was 15 min of which greater than 50% was in counseling.

## 2019-03-12 DIAGNOSIS — I10 ESSENTIAL HYPERTENSION: ICD-10-CM

## 2019-03-12 RX ORDER — AMLODIPINE BESYLATE 2.5 MG/1
2.5 TABLET ORAL DAILY
Qty: 90 TABLET | Refills: 1 | Status: SHIPPED | OUTPATIENT
Start: 2019-03-12 | End: 2019-09-06

## 2019-03-12 NOTE — TELEPHONE ENCOUNTER
"Request for medication refill:    Amlodipine 2.5mg    Providers if patient needs an appointment and you are willing to give a one month supply please refill for one month and  send a letter/MyChart using \".SMILLIMITEDREFILL\" .smillimited and route chart to \"P SMI \" (Giving one month refill in non controlled medications is strongly recommended before denial)    If refill has been denied, meaning absolutely no refills without visit, please complete the smart phrase \".smirxrefuse\" and route it to the \"P SMI MED REFILLS\"  pool to inform the patient and the pharmacy.    Pascale Braga, [facility]        "

## 2019-03-26 ENCOUNTER — ANCILLARY PROCEDURE (OUTPATIENT)
Dept: ULTRASOUND IMAGING | Facility: CLINIC | Age: 49
End: 2019-03-26
Attending: PLASTIC SURGERY
Payer: COMMERCIAL

## 2019-03-26 RX ORDER — LIDOCAINE HYDROCHLORIDE 10 MG/ML
5 INJECTION, SOLUTION EPIDURAL; INFILTRATION; INTRACAUDAL; PERINEURAL ONCE
Status: COMPLETED | OUTPATIENT
Start: 2019-03-26 | End: 2019-03-26

## 2019-03-26 RX ORDER — TRIAMCINOLONE ACETONIDE 40 MG/ML
40 INJECTION, SUSPENSION INTRA-ARTICULAR; INTRAMUSCULAR ONCE
Status: DISCONTINUED | OUTPATIENT
Start: 2019-03-26 | End: 2019-03-26 | Stop reason: CLARIF

## 2019-03-26 RX ORDER — TRIAMCINOLONE ACETONIDE 40 MG/ML
40 INJECTION, SUSPENSION INTRA-ARTICULAR; INTRAMUSCULAR ONCE
Status: COMPLETED | OUTPATIENT
Start: 2019-03-26 | End: 2019-03-26

## 2019-03-26 RX ADMIN — LIDOCAINE HYDROCHLORIDE 5 ML: 10 INJECTION, SOLUTION EPIDURAL; INFILTRATION; INTRACAUDAL; PERINEURAL at 10:34

## 2019-03-26 RX ADMIN — TRIAMCINOLONE ACETONIDE 40 MG: 40 INJECTION, SUSPENSION INTRA-ARTICULAR; INTRAMUSCULAR at 10:35

## 2019-03-28 DIAGNOSIS — I10 BENIGN ESSENTIAL HYPERTENSION: ICD-10-CM

## 2019-03-28 RX ORDER — LISINOPRIL 5 MG/1
5 TABLET ORAL DAILY
Qty: 30 TABLET | Refills: 1 | Status: SHIPPED | OUTPATIENT
Start: 2019-03-28 | End: 2019-05-23

## 2019-03-28 NOTE — TELEPHONE ENCOUNTER

## 2019-05-21 ENCOUNTER — OFFICE VISIT (OUTPATIENT)
Dept: ORTHOPEDICS | Facility: CLINIC | Age: 49
End: 2019-05-21
Payer: COMMERCIAL

## 2019-05-21 VITALS — WEIGHT: 278 LBS | HEIGHT: 75 IN | BODY MASS INDEX: 34.57 KG/M2

## 2019-05-21 DIAGNOSIS — G56.23 CUBITAL TUNNEL SYNDROME OF BOTH UPPER EXTREMITIES: Primary | ICD-10-CM

## 2019-05-21 ASSESSMENT — MIFFLIN-ST. JEOR: SCORE: 2201.63

## 2019-05-21 NOTE — LETTER
5/21/2019       RE: Lui Arrington  [address] [place] [address]     Dear Colleague,    Thank you for referring your patient, Lui Arrington, to the [facility]. Please see a copy of my visit note below.    Patient returns today for follow-up visit regarding bilateral carpal tunnel syndrome and bilateral cubital tunnel syndrome    Interval history: Since previous visit, patient had bilateral ultrasound-guided carpal tunnel steroid injections.  Following these injections his symptoms completely resolved.  He has had nearly 2 months of complete relief, but is starting to notice the hint of recurrence of some tingling sensation in the affected fingers.  Of note he also had what appears to be an allergic reaction overlying the carpal tunnel which could be indicative of a reaction to 1 of the additives.  He has previously had both steroid and anesthetic without any of the symptoms so it is unclear what triggered the reaction.  He showed a picture of the rash, but but is no longer having allergic symptoms.  Since his symptoms are coming back he would prefer to try intermittent night splints when he is symptomatic rather than pursuing any surgical intervention at this time.    In terms of his cubital tunnel syndrome symptoms, they really have not changed since last visit.  He continues to have symptoms only when his elbows are held in a flexed position and resting on the surface.  He does not notice symptoms if his elbows are flexed without resting.  The left side is more affected than the right.  He has not noticed any weakness.  We had referred him to hand therapy to work on ulnar nerve gliding exercises, but unfortunately had to cancel his therapy appointment.  He did however look up some gliding exercises online and did these intermittent without significant benefit.  He is interested in trying hand therapy again.    Physical  exam:    No acute distress.  Pleasant and cooperative.  Nonlabored breathing on room air.  Focused exam of bilateral hands with no thenar atrophy.  No wasting of the first webspace.  Mildly positive Tinel's sign at the Cubital Tunl. and Carpal Tunl. on the left side.  Negative Tinel's on the right.  No recreation of symptoms with elbows held in a flexed position.  No snapping or subluxation of the ulnar nerve at the elbow.    Assessment:  1.  Bilateral carpal tunnel syndrome, left more symptomatic than right, with resolution of symptoms with steroid injection and nighttime splints.  2.  Bilateral cubital tunnel syndrome, left more symptomatic than right    Plan: Patient is highly motivated to avoid surgery.  Since his carpal tunnel syndrome symptoms have resolved at this time, we would agree with to hold off on any surgical intervention.  However if his symptoms recur, and are no longer manageable with nighttime bracing, then we would favor surgical intervention.  We discussed both endoscopic and open carpal tunnel release.  This is also previously been discussed with the patient.  He would favor endoscopic release so when the time comes we would refer him to Dr. Martha Lara.     For his cubital tunnel syndrome, we would continue to recommend nonoperative management.  We will refer him to hand therapy for ulnar nerve gliding exercises.  We also think he would benefit from padded sleeves as his symptoms only seem to occur when his elbows are resting on a hard surface.    The patient is very appreciative of this discussion.  We also discussed warning signs that should prompt more aggressive intervention including persistent numbness, rather than intermittent, nighttime symptoms that failed to respond to bracing, and certainly any signs of muscle weakness or atrophy.  If any of these symptoms occur we would favor surgical intervention.    This patient was seen and discussed with Dr. Mishra who is in agreement with this  plan    Anne Mackenzie, PGY 4    Attending attestation:  I agree with the resident's assessment and plan.  Total time spent with patient was 15 min of which greater than 50% was in counseling.    Josias Mishra MD

## 2019-05-21 NOTE — PROGRESS NOTES
Patient returns today for follow-up visit regarding bilateral carpal tunnel syndrome and bilateral cubital tunnel syndrome    Interval history: Since previous visit, patient had bilateral ultrasound-guided carpal tunnel steroid injections.  Following these injections his symptoms completely resolved.  He has had nearly 2 months of complete relief, but is starting to notice the hint of recurrence of some tingling sensation in the affected fingers.  Of note he also had what appears to be an allergic reaction overlying the carpal tunnel which could be indicative of a reaction to 1 of the additives.  He has previously had both steroid and anesthetic without any of the symptoms so it is unclear what triggered the reaction.  He showed a picture of the rash, but but is no longer having allergic symptoms.  Since his symptoms are coming back he would prefer to try intermittent night splints when he is symptomatic rather than pursuing any surgical intervention at this time.    In terms of his cubital tunnel syndrome symptoms, they really have not changed since last visit.  He continues to have symptoms only when his elbows are held in a flexed position and resting on the surface.  He does not notice symptoms if his elbows are flexed without resting.  The left side is more affected than the right.  He has not noticed any weakness.  We had referred him to hand therapy to work on ulnar nerve gliding exercises, but unfortunately had to cancel his therapy appointment.  He did however look up some gliding exercises online and did these intermittent without significant benefit.  He is interested in trying hand therapy again.    Physical exam:    No acute distress.  Pleasant and cooperative.  Nonlabored breathing on room air.  Focused exam of bilateral hands with no thenar atrophy.  No wasting of the first webspace.  Mildly positive Tinel's sign at the Cubital Tunl. and Carpal Tunl. on the left side.  Negative Tinel's on the right.   No recreation of symptoms with elbows held in a flexed position.  No snapping or subluxation of the ulnar nerve at the elbow.    Assessment:  1.  Bilateral carpal tunnel syndrome, left more symptomatic than right, with resolution of symptoms with steroid injection and nighttime splints.  2.  Bilateral cubital tunnel syndrome, left more symptomatic than right    Plan: Patient is highly motivated to avoid surgery.  Since his carpal tunnel syndrome symptoms have resolved at this time, we would agree with to hold off on any surgical intervention.  However if his symptoms recur, and are no longer manageable with nighttime bracing, then we would favor surgical intervention.  We discussed both endoscopic and open carpal tunnel release.  This is also previously been discussed with the patient.  He would favor endoscopic release so when the time comes we would refer him to Dr. Martha Lara.     For his cubital tunnel syndrome, we would continue to recommend nonoperative management.  We will refer him to hand therapy for ulnar nerve gliding exercises.  We also think he would benefit from padded sleeves as his symptoms only seem to occur when his elbows are resting on a hard surface.    The patient is very appreciative of this discussion.  We also discussed warning signs that should prompt more aggressive intervention including persistent numbness, rather than intermittent, nighttime symptoms that failed to respond to bracing, and certainly any signs of muscle weakness or atrophy.  If any of these symptoms occur we would favor surgical intervention.    This patient was seen and discussed with Dr. Mishra who is in agreement with this plan    Anne Mackenzie, PGY 4      Attending attestation:  I agree with the resident's assessment and plan.  Total time spent with patient was 15 min of which greater than 50% was in counseling.  Josias Mishra MD

## 2019-05-21 NOTE — NURSING NOTE
"Reason For Visit:   Chief Complaint   Patient presents with     Right Wrist - RECHECK     Left Wrist - RECHECK     RECHECK     US guided needle placement inj       Primary MD: Richy Dorsey  Ref. MD: laurence     Age: 49 year old    ?  No      Ht 1.905 m (6' 3\")   Wt 126.1 kg (278 lb)   BMI 34.75 kg/m        Pain Assessment  Patient Currently in Pain: No               QuickDASH Assessment  No flowsheet data found.       Current Outpatient Medications   Medication Sig Dispense Refill     amLODIPine (NORVASC) 2.5 MG tablet Take 1 tablet (2.5 mg) by mouth daily 90 tablet 1     aspirin 81 MG EC tablet Take 1 tablet (81 mg) by mouth daily 90 tablet 3     atorvastatin (LIPITOR) 40 MG tablet Take 1 tablet (40 mg) by mouth every evening 90 tablet 3     cetirizine (ZYRTEC) 10 MG tablet Take 10 mg by mouth daily       lisinopril (PRINIVIL/ZESTRIL) 5 MG tablet Take 1 tablet (5 mg) by mouth daily 30 tablet 1     SILDENAFIL CITRATE PO Take by mouth as needed        Fexofenadine HCl (ALLEGRA PO) Take 30 mg by mouth daily       nitroGLYcerin (NITROSTAT) 0.4 MG sublingual tablet For chest pain place 1 tablet under the tongue every 5 minutes for 3 doses. If symptoms persist 5 minutes after 1st dose call 911. (Patient not taking: Reported on 12/11/2018) 90 tablet 1       Allergies   Allergen Reactions     Ciprofloxacin Hives     Erythromycin      Seasonal Allergies        Ale Monroe, ATC    "

## 2019-05-23 DIAGNOSIS — I10 BENIGN ESSENTIAL HYPERTENSION: ICD-10-CM

## 2019-05-23 RX ORDER — LISINOPRIL 5 MG/1
5 TABLET ORAL DAILY
Qty: 90 TABLET | Refills: 1 | Status: SHIPPED | OUTPATIENT
Start: 2019-05-23 | End: 2019-12-04

## 2019-05-23 NOTE — TELEPHONE ENCOUNTER

## 2019-05-24 ENCOUNTER — THERAPY VISIT (OUTPATIENT)
Dept: OCCUPATIONAL THERAPY | Facility: CLINIC | Age: 49
End: 2019-05-24
Attending: STUDENT IN AN ORGANIZED HEALTH CARE EDUCATION/TRAINING PROGRAM
Payer: COMMERCIAL

## 2019-05-24 DIAGNOSIS — R20.0 NUMBNESS OF FINGERS OF BOTH HANDS: Primary | ICD-10-CM

## 2019-05-24 DIAGNOSIS — G56.23 CUBITAL TUNNEL SYNDROME OF BOTH UPPER EXTREMITIES: ICD-10-CM

## 2019-05-24 PROCEDURE — 97165 OT EVAL LOW COMPLEX 30 MIN: CPT | Mod: GO | Performed by: OCCUPATIONAL THERAPIST

## 2019-05-24 PROCEDURE — 97760 ORTHOTIC MGMT&TRAING 1ST ENC: CPT | Mod: GO | Performed by: OCCUPATIONAL THERAPIST

## 2019-05-24 PROCEDURE — 97112 NEUROMUSCULAR REEDUCATION: CPT | Mod: GO | Performed by: OCCUPATIONAL THERAPIST

## 2019-05-24 NOTE — PROGRESS NOTES
"Hand Therapy Initial Evaluation    Current Date:  5/24/2019  Diagnosis: Cubital tunnel syndrome of both upper extremities   DOI/MD order:  5/21/19  Onset:September   Referring MD: Josias Mishra MD      Subjective:  Lui Arrington is a 49 year old R hand dominant male.    Patient reports symptoms of weakness/loss of strength, numbness and tingling  of the bilateral hands which occurred due to Cubital tunnel. Pt notices that a degrees in overall . Pt stated he does have an anastomosis with ulnar and medial nerve in R UE. Pt stated left is worse than right, but his right side \"feels different\" due to the anastomosis.     Since onset symptoms are Gradually getting worse.  Special tests:  EMG.  Previous treatment: none.    General health as reported by patient is excellent.  Pertinent medical history includes:Chemical Dependency, High Blood Pressure, Numbness/Tingling, Overweight, Smoking  Medical allergies:see EHR.  Surgical history: none.  Medication history: High Blood Pressure, see EHR.    Occupational Profile Information:  Current occupation: teacher and consultant   Currently working in normal job without restrictions  Job Tasks: Computer Work, writing, speaking   Prior functional level:  no limitations  Barriers include:none  Mobility: No difficulty  Transportation: drives  Leisure activities/hobbies:     Functional Outcome Measure:   See flowsheet       Objective:  Pain Level (Scale 0-10):   5/24/2019   At Rest 0/10   With Use 0/10     Pain Description:5/21/19  Date 5/24/2019   Location SF, RF, and MF bilaterally    Pain Quality Tingling   Frequency intermittent     Pain is worst  daytime   Exacerbated by  Has paresthesias at rest on occasion, however pressure on elbows in flexion increases the paresthesias.  Sometimes wakes up at night with paresthesias     Relieved by rest   Progression worsening      Edema:  NONE  Sensation:  Decreased Ulnar Nerve distribution    Numbness/Tingling Level " Report  VAS(0-10) 5/24/2019  R L   At Rest: 1/10 2/10   With Use: 7-8/10 7-8/10     Special Tests:  Pain Report:  - none    + mild    ++ moderate    +++ severe    5/24/2019  R 5/24/2019  L   Tinels at cubital tunnel 6 seconds   10 seconds    Tinel's at [place]   - 10 seconds    Elbow Flexion Test   - 20 seconds   Ulnar nerve subluxation at elbow   none   none   ULTT ulnar nerve  Done in sitting  5/5 5/5   Froment's Test   - -     Ulnar Nerve MMT: (Scale 0/5)   5/24/2019  R 5/24/2019  L   Finger Adduction 5/5 5/5   Finger Abduction 5/5 5/5   Adductor Pollicis 5/5 5/5     Strength   (Measured in pounds)  Pain Report:  + mild    ++ moderate    +++ severe    5/24/2019 5/24/2019   Trials R L   1  2  3 125  125  120 115  100  94   Average 123 103     Lat Pinch 5/24/2019 5/24/2019   Trials R L   1  2   30  30 30  30   Average 30 30       Assessment:  Patient presents with symptoms consistent with diagnosis above,  with conservative intervention.     Patient's limitations or Problem List includes:  Weakness and Sensory disturbance of the bilateral elbow and hand which interferes with the patient's ability to perform Self Care Tasks (dressing) and Household Chores as compared to previous level of function.    Rehab Potential:  Excellent - Return to full activity, no limitations    Patient will benefit from skilled Occupational Therapy to increase overall strength and sensation to return to previous activity level and resume normal daily tasks and to reach their rehab potential.    Barriers to Learning:  No barrier    Communication Issues:  Patient appears to be able to clearly communicate and understand verbal and written communication and follow directions correctly.    Chart Review: Chart Review and Simple history review with patient    Identified Performance Deficits: dressing and meal preparation and cleanup    Assessment of Occupational Performance:  1-3 Performance Deficits    Clinical Decision Making  (Complexity): Low complexity    Treatment Explanation:  The following has been discussed with the patient:  RX ordered/plan of care  Anticipated outcomes  Possible risks and side effects    Plan:  Frequency:  1 X week, once daily  Duration:  for 5 weeks    Treatment Plan:    Therapeutic Exercise:  Tendon Gliding, Isotonics, Isometrics and Stabilization  Neuromuscular re-education:  Nerve Gliding, Sensory re-education, Posture, Isometrics and Stabilization  Manual Techniques:  Myofascial release  Orthotic Fabrication:  Static orthosis  Self Care:  Ergonomic Considerations  Discharge Plan:  Achieve all LTG.  Independent in home treatment program.  Reach maximal therapeutic benefit.    Home Exercise Program:  Active ulnar nerve glides  L Elbow orthosis 30 degrees flexion for night  Heelbow pads PRN for protection during the day     Next Visit:  Ulnar nerve tension test in supine   Passive nerve glides   Power  strengthening   AE: Book stand for reading  MFR along ulnar nerve path

## 2019-05-29 ENCOUNTER — THERAPY VISIT (OUTPATIENT)
Dept: OCCUPATIONAL THERAPY | Facility: CLINIC | Age: 49
End: 2019-05-29
Payer: COMMERCIAL

## 2019-05-29 DIAGNOSIS — R20.0 NUMBNESS OF FINGERS OF BOTH HANDS: Primary | ICD-10-CM

## 2019-05-29 PROCEDURE — 97110 THERAPEUTIC EXERCISES: CPT | Mod: GO | Performed by: OCCUPATIONAL THERAPIST

## 2019-05-29 PROCEDURE — 97112 NEUROMUSCULAR REEDUCATION: CPT | Mod: GO | Performed by: OCCUPATIONAL THERAPIST

## 2019-05-29 PROCEDURE — 97140 MANUAL THERAPY 1/> REGIONS: CPT | Mod: GO | Performed by: OCCUPATIONAL THERAPIST

## 2019-06-13 ENCOUNTER — THERAPY VISIT (OUTPATIENT)
Dept: OCCUPATIONAL THERAPY | Facility: CLINIC | Age: 49
End: 2019-06-13
Payer: COMMERCIAL

## 2019-06-13 DIAGNOSIS — R20.0 NUMBNESS OF FINGERS OF BOTH HANDS: Primary | ICD-10-CM

## 2019-06-13 DIAGNOSIS — G56.23 CUBITAL TUNNEL SYNDROME OF BOTH UPPER EXTREMITIES: ICD-10-CM

## 2019-06-13 PROCEDURE — 97110 THERAPEUTIC EXERCISES: CPT | Mod: GO | Performed by: OCCUPATIONAL THERAPIST

## 2019-06-13 PROCEDURE — 97112 NEUROMUSCULAR REEDUCATION: CPT | Mod: GO | Performed by: OCCUPATIONAL THERAPIST

## 2019-06-26 ENCOUNTER — TELEPHONE (OUTPATIENT)
Dept: NEUROLOGY | Facility: CLINIC | Age: 49
End: 2019-06-26

## 2019-06-26 NOTE — TELEPHONE ENCOUNTER
----- Message from Mamadou Carroll sent at 6/26/2019  9:00 AM CDT -----  Regarding: FW: one year follow up  Left pt 2 messages  ----- Message -----  From: Guillermina Issa MD  Sent: 6/19/2019  To: [id number]  Subject: one year follow up                                 Pt needs MR cox in august 2019 and follow up with me afterwards    Thanks,  Guillermina

## 2019-07-01 ENCOUNTER — THERAPY VISIT (OUTPATIENT)
Dept: OCCUPATIONAL THERAPY | Facility: CLINIC | Age: 49
End: 2019-07-01
Payer: COMMERCIAL

## 2019-07-01 DIAGNOSIS — G56.23 CUBITAL TUNNEL SYNDROME OF BOTH UPPER EXTREMITIES: ICD-10-CM

## 2019-07-01 DIAGNOSIS — R20.0 NUMBNESS OF FINGERS OF BOTH HANDS: Primary | ICD-10-CM

## 2019-07-01 PROCEDURE — 97535 SELF CARE MNGMENT TRAINING: CPT | Mod: GO | Performed by: OCCUPATIONAL THERAPIST

## 2019-07-01 PROCEDURE — 97112 NEUROMUSCULAR REEDUCATION: CPT | Mod: GO | Performed by: OCCUPATIONAL THERAPIST

## 2019-07-01 NOTE — PROGRESS NOTES
Discharge Note - Hand Therapy    Current Date:  7/1/2019    Initial Evaluation Date:  5/24/2019  Reporting period is from 5/24/2019  to 7/1/2019  Number of Visits:  4    Diagnosis:  Cubital tunnel syndrome of both upper extremities  DOI:  5/21/2019 (MD order)  Referring physician: Josias Mishra MD    Subjective:   Subjective changes as noted by patient:  My paresthesias are much better, I only really have them if I'm resting on my elbows.  Initial Pain Levels:  0/10 at rest;  0/10 with use  Current Pain Levels:  0/10 at rest;  0/10 with use  Functional changes noted by patient:  Improvement in Self Care Tasks (dressing) and Household Chores  Patient has noted adverse reaction to:  None        Objective:  Special Tests:  Pain Report:  - none    + mild    ++ moderate    +++ severe    5/24/2019  R 5/24/2019  L 7/1/2019  R 7/1/2019  L   Tinels at cubital tunnel 6 seconds   10 seconds  - 30 seconds  Mild tingling   Tinel's at [place]   - 10 seconds      Elbow Flexion Test   - 20 seconds 30 seconds  Mild tingling 25 seconds  Mild tingling   Ulnar nerve subluxation at elbow   none   none  NT   ULTT ulnar nerve  Done in sitting  5/5 5/5  NT   Froment's Test   - -  NT     Ulnar Nerve MMT: (Scale 0/5)   5/24/2019  R 5/24/2019  L   Finger Adduction 5/5 5/5   Finger Abduction 5/5 5/5   Adductor Pollicis 5/5 5/5     Strength   (Measured in pounds)  Pain Report:  + mild    ++ moderate    +++ severe    5/24/2019 5/24/2019 7/1/2019 7/1/2019   Trials R L R L   1  2  3 125  125  120 115  100  94 121  115  112 110  100  103   Average 123 103 116 104     Lat Pinch 5/24/2019 5/24/2019   Trials R L   1  2   30  30 30  30   Average 30 30     See Objective measurements in Physical Exam        Assessment:  Response to therapy has been improvement to:  Paresthesias:  Ulnar nerve - less intense numbness and tingling, smaller area of involvement and less burning  Appropriateness of Rx I have re-evaluated this patient and find that  the nature, scope, duration and intensity of the therapy is appropriate for the medical condition of the patient.  Overall Assessment:  Patient's symptoms are resolving.  Patient has met Short and Long Term Treatment Goals.  Patient is ready to be discharged from therapy and continue their home treatment program.  STG/LTG:  See goal sheet for details and updates.    Plan:  Frequency/Duration:  Discharge from Hand Therapy; continue home program.

## 2019-08-30 DIAGNOSIS — R07.9 CHEST PAIN, UNSPECIFIED TYPE: ICD-10-CM

## 2019-08-30 RX ORDER — ATORVASTATIN CALCIUM 40 MG/1
40 TABLET, FILM COATED ORAL EVERY EVENING
Qty: 90 TABLET | Refills: 3 | Status: SHIPPED | OUTPATIENT
Start: 2019-08-30 | End: 2019-12-11

## 2019-09-06 DIAGNOSIS — I10 ESSENTIAL HYPERTENSION: ICD-10-CM

## 2019-09-06 RX ORDER — AMLODIPINE BESYLATE 2.5 MG/1
2.5 TABLET ORAL DAILY
Qty: 90 TABLET | Refills: 0 | Status: SHIPPED | OUTPATIENT
Start: 2019-09-06 | End: 2019-11-29

## 2019-09-16 DIAGNOSIS — I67.1 UNRUPTURED CEREBRAL ANEURYSM: Primary | ICD-10-CM

## 2019-09-19 ENCOUNTER — MYC MEDICAL ADVICE (OUTPATIENT)
Dept: NEUROLOGY | Facility: CLINIC | Age: 49
End: 2019-09-19

## 2019-09-19 DIAGNOSIS — F40.240 CLAUSTROPHOBIA: Primary | ICD-10-CM

## 2019-09-20 RX ORDER — DIAZEPAM 10 MG
10 TABLET ORAL
Qty: 1 TABLET | Refills: 0 | Status: SHIPPED | OUTPATIENT
Start: 2019-09-20 | End: 2020-10-29

## 2019-10-14 ENCOUNTER — ANCILLARY PROCEDURE (OUTPATIENT)
Dept: MRI IMAGING | Facility: CLINIC | Age: 49
End: 2019-10-14
Attending: PSYCHIATRY & NEUROLOGY
Payer: COMMERCIAL

## 2019-10-14 DIAGNOSIS — I67.1 UNRUPTURED CEREBRAL ANEURYSM: ICD-10-CM

## 2019-11-02 ENCOUNTER — HEALTH MAINTENANCE LETTER (OUTPATIENT)
Age: 49
End: 2019-11-02

## 2019-11-29 DIAGNOSIS — I10 ESSENTIAL HYPERTENSION: ICD-10-CM

## 2019-11-29 RX ORDER — AMLODIPINE BESYLATE 2.5 MG/1
2.5 TABLET ORAL DAILY
Qty: 90 TABLET | Refills: 0 | Status: SHIPPED | OUTPATIENT
Start: 2019-11-29 | End: 2020-03-02

## 2019-11-29 NOTE — TELEPHONE ENCOUNTER
"Request for medication refill: amLODIPine (NORVASC) 2.5 MG tablet    Providers if patient needs an appointment and you are willing to give a one month supply please refill for one month and  send a letter/MyChart using \".SMILLIMITEDREFILL\" .smillimited and route chart to \"[facility] \" (Giving one month refill in non controlled medications is strongly recommended before denial)    If refill has been denied, meaning absolutely no refills without visit, please complete the smart phrase \".smirxrefuse\" and route it to the \"[facility] MED REFILLS\"  pool to inform the patient and the pharmacy.    Ruchi Dunham, CMA        "

## 2019-12-04 DIAGNOSIS — I10 BENIGN ESSENTIAL HYPERTENSION: ICD-10-CM

## 2019-12-04 RX ORDER — LISINOPRIL 5 MG/1
5 TABLET ORAL DAILY
Qty: 90 TABLET | Refills: 0 | Status: SHIPPED | OUTPATIENT
Start: 2019-12-04 | End: 2019-12-11

## 2019-12-04 NOTE — TELEPHONE ENCOUNTER

## 2019-12-10 RX ORDER — INFLUENZA A VIRUS A/NEBRASKA/14/2019 (H1N1) ANTIGEN (MDCK CELL DERIVED, PROPIOLACTONE INACTIVATED), INFLUENZA A VIRUS A/DELAWARE/39/2019 (H3N2) ANTIGEN (MDCK CELL DERIVED, PROPIOLACTONE INACTIVATED), INFLUENZA B VIRUS B/SINGAPORE/INFTT-16-0610/2016 ANTIGEN (MDCK CELL DERIVED, PROPIOLACTONE INACTIVATED), INFLUENZA B VIRUS B/DARWIN/7/2019 ANTIGEN (MDCK CELL DERIVED, PROPIOLACTONE INACTIVATED) 15; 15; 15; 15 UG/.5ML; UG/.5ML; UG/.5ML; UG/.5ML
1 INJECTION, SUSPENSION INTRAMUSCULAR ONCE
Refills: 0 | COMMUNITY
Start: 2019-09-07 | End: 2021-07-30

## 2019-12-11 ENCOUNTER — OFFICE VISIT (OUTPATIENT)
Dept: FAMILY MEDICINE | Facility: CLINIC | Age: 49
End: 2019-12-11
Payer: COMMERCIAL

## 2019-12-11 VITALS
RESPIRATION RATE: 16 BRPM | DIASTOLIC BLOOD PRESSURE: 85 MMHG | SYSTOLIC BLOOD PRESSURE: 150 MMHG | HEART RATE: 64 BPM | TEMPERATURE: 97.6 F | BODY MASS INDEX: 35.22 KG/M2 | WEIGHT: 281.8 LBS | OXYGEN SATURATION: 100 %

## 2019-12-11 DIAGNOSIS — E78.49 OTHER HYPERLIPIDEMIA: ICD-10-CM

## 2019-12-11 DIAGNOSIS — N52.9 VASCULOGENIC ERECTILE DYSFUNCTION, UNSPECIFIED VASCULOGENIC ERECTILE DYSFUNCTION TYPE: ICD-10-CM

## 2019-12-11 DIAGNOSIS — J30.1 NON-SEASONAL ALLERGIC RHINITIS DUE TO POLLEN: ICD-10-CM

## 2019-12-11 DIAGNOSIS — I10 BENIGN ESSENTIAL HYPERTENSION: Primary | ICD-10-CM

## 2019-12-11 DIAGNOSIS — Z13.9 SCREENING FOR CONDITION: ICD-10-CM

## 2019-12-11 DIAGNOSIS — I25.10 CORONARY ARTERY DISEASE INVOLVING NATIVE CORONARY ARTERY OF NATIVE HEART WITHOUT ANGINA PECTORIS: ICD-10-CM

## 2019-12-11 LAB
BUN SERPL-MCNC: 14.4 MG/DL (ref 7–21)
CALCIUM SERPL-MCNC: 9.7 MG/DL (ref 8.5–10.1)
CHLORIDE SERPLBLD-SCNC: 99.4 MMOL/L (ref 98–110)
CHOLEST SERPL-MCNC: 98.9 MG/DL (ref 0–200)
CHOLEST/HDLC SERPL: 2.6 {RATIO} (ref 0–5)
CO2 SERPL-SCNC: 27.3 MMOL/L (ref 20–32)
CREAT SERPL-MCNC: 0.9 MG/DL (ref 0.7–1.3)
GFR SERPL CREATININE-BSD FRML MDRD: >90 ML/MIN/1.7 M2
GLUCOSE SERPL-MCNC: 110 MG'DL (ref 70–99)
HDLC SERPL-MCNC: 38.4 MG/DL
LDLC SERPL CALC-MCNC: 30 MG/DL (ref 0–129)
POTASSIUM SERPL-SCNC: 4.2 MMOL/DL (ref 3.3–4.5)
SODIUM SERPL-SCNC: 140.4 MMOL/L (ref 132.6–141.4)
TRIGL SERPL-MCNC: 153.4 MG/DL (ref 0–150)
VLDL CHOLESTEROL: 30.7 MG/DL (ref 7–32)

## 2019-12-11 RX ORDER — FAMOTIDINE 20 MG
TABLET ORAL
COMMUNITY
End: 2023-09-12

## 2019-12-11 RX ORDER — LISINOPRIL 10 MG/1
10 TABLET ORAL DAILY
Qty: 90 TABLET | Refills: 3 | Status: SHIPPED | OUTPATIENT
Start: 2019-12-11 | End: 2020-03-04

## 2019-12-11 RX ORDER — ATORVASTATIN CALCIUM 80 MG/1
80 TABLET, FILM COATED ORAL EVERY EVENING
Qty: 90 TABLET | Refills: 3 | Status: SHIPPED | OUTPATIENT
Start: 2019-12-11 | End: 2019-12-11

## 2019-12-11 RX ORDER — ATORVASTATIN CALCIUM 40 MG/1
40 TABLET, FILM COATED ORAL EVERY EVENING
Qty: 90 TABLET | Refills: 3 | Status: SHIPPED | OUTPATIENT
Start: 2019-12-11 | End: 2021-03-16

## 2019-12-11 RX ORDER — SILDENAFIL CITRATE 20 MG/1
20 TABLET ORAL 3 TIMES DAILY
Qty: 30 TABLET | Refills: 11 | Status: SHIPPED | OUTPATIENT
Start: 2019-12-11 | End: 2020-10-29

## 2019-12-11 RX ORDER — FLUTICASONE PROPIONATE 50 MCG
1 SPRAY, SUSPENSION (ML) NASAL DAILY
COMMUNITY
End: 2022-05-17

## 2019-12-11 RX ORDER — CETIRIZINE HYDROCHLORIDE 10 MG/1
10 TABLET ORAL DAILY
COMMUNITY
End: 2021-07-30

## 2019-12-11 RX ORDER — SILDENAFIL CITRATE 20 MG/1
20 TABLET ORAL 3 TIMES DAILY
Qty: 30 TABLET | Refills: 11 | Status: SHIPPED | OUTPATIENT
Start: 2019-12-11 | End: 2019-12-11

## 2019-12-11 NOTE — PROGRESS NOTES
Adam is a 49 year old  who presents for   Patient presents with:  Recheck Medication: Hypertension and statin  Allergies: New medication for allergies that will not cause drowsiness-head and sinus-possible referral  Erectile Dysfunction: Viagra worked in the past and would like to possibly start again      Assessment and Plan        1. Coronary artery disease involving native coronary artery of native heart without angina pectoris  Stable no signs of angina, will continue exercise -followup  If anginal symptoms     2. Screening for condition    - HIV Antigen Antibody Combo    3. Benign essential hypertension  Uncontrolled will increase lisinopril as below  - Basic Metabolic Panel ([facility])  - lisinopril (PRINIVIL/ZESTRIL) 10 MG tablet; Take 1 tablet (10 mg) by mouth daily  Dispense: 90 tablet; Refill: 3    4. Other hyperlipidemia  On out patient screening LDL was 80 though on   - Lipid Cascade ([facility])  - atorvastatin (LIPITOR) 80 MG tablet; Take 1 tablet (80 mg) by mouth every evening  Dispense: 90 tablet; Refill: 3    5. Vasculogenic erectile dysfunction, unspecified vasculogenic erectile dysfunction type  Discussed use of sildenafil and avoiding nitrates and alpha blockers  - sildenafil (REVATIO) 20 MG tablet; Take 1 tablet (20 mg) by mouth 3 times daily  Dispense: 30 tablet; Refill: 11    6. Non-seasonal allergic rhinitis due to pollen  Will continue with Flonase and nasal saline irrigation         No follow-ups on file.    Medications Discontinued During This Encounter   Medication Reason     cetirizine (ZYRTEC) 10 MG tablet Medication Reconciliation Clean Up     Fexofenadine HCl (ALLEGRA PO) Medication Reconciliation Clean Up     nitroGLYcerin (NITROSTAT) 0.4 MG sublingual tablet Medication Reconciliation Clean Up     SILDENAFIL CITRATE PO Medication Reconciliation Clean Up         Richy Dorsey MD         HPI       Adam is a 49 year old  who presents for   Patient presents with:  Recheck Medication:  "Hypertension and statin  Allergies: New medication for allergies that will not cause drowsiness-head and sinus-possible referral  Erectile Dysfunction: Viagra worked in the past and would like to possibly start again      Visit [place]  1. Allergic Rhinitis  All \"nonsedating\" antihistamines cause sedation, wondering what else he could use    Vascular Disease Follow-up:  Coronary Artery Disease (CAD)      Chest pain or pressure, left side neck or arm pain: No     Shortness of breath/increased sweats/nausea with exertion: No     Pain in calves walking 1-2 blocks: No     Worsened or new symptoms since last visit: No     Nitroglycerin use: no    Daily aspirin use: Yes     reports that he quit smoking about 21 years ago. He has never used smokeless tobacco., Interested in quitting? N/A    Blood Pressure goal <140/90, currently controlled     Hypertension Follow-up      Outpatient blood pressures are being checked at home.  Results are 120s-140s.    Low Salt Diet: no added salt    Daily NSAID Use?No     Did patient take their HTN pills today/last night as usual?  Yes     Hyperlipidemia Follow-Up      Recommended Level of Therapy:High Intensity Statin (atorvastatin 40*-80 mg or rosuvastatin 20-40mg)          Rate your low fat/cholesterol diet?: good    Taking statin?  No    Other lipid medications/supplements?:  none Cholesterol   Date Value Ref Range Status   08/24/2018 141.9 0.0 - 200.0 mg/dL Final      HDL Cholesterol   Date Value Ref Range Status   08/24/2018 42.0 >40.0 mg/dL Final      LDL Cholesterol Calculated   Date Value Ref Range Status   08/24/2018 78 0 - 129 mg/dL Final      Triglycerides   Date Value Ref Range Status   08/24/2018 110.6 0.0 - 150.0 mg/dL Final      Cholesterol/HDL Ratio   Date Value Ref Range Status   08/24/2018 3.4 0.0 - 5.0 Final           Adherence and Exercise  Medication side effects: no  How often is a medication missed? Never  Exercise:walking  and running 6-7 days/week for an average of " 30-45 minutes   Problem, Medication and Allergy Lists were reviewed and updated if needed..  Patient is an established patient of this clinic..  Health Maintenance Due   Topic Date Due     PREVENTIVE CARE VISIT  1970     HIV SCREENING  02/08/1983     ZOSTER IMMUNIZATION (1 of 2) 02/08/2018     PHQ-2  01/01/2019     LIPID  08/24/2019     BMP  09/03/2019          Review of Systems:   Review of Systems  7 point review of symptoms was otherwise negative except as noted in HPI         Physical Exam:     Vitals:    12/11/19 1423 12/11/19 1424   BP: (!) 158/83 (!) 150/85   Pulse: 64    Resp: 16    Temp: 97.6  F (36.4  C)    TempSrc: Oral    SpO2: 100%    Weight: 127.8 kg (281 lb 12.8 oz)      Body mass index is 35.22 kg/m .  Vitals were reviewed and were normal     Physical Exam  Vitals signs reviewed.   Constitutional:       General: He is not in acute distress.     Appearance: He is well-developed. He is not diaphoretic.   HENT:      Head: Normocephalic.   Eyes:      General: No scleral icterus.     Conjunctiva/sclera: Conjunctivae normal.   Neck:      Musculoskeletal: Normal range of motion.      Thyroid: No thyromegaly.   Cardiovascular:      Rate and Rhythm: Normal rate and regular rhythm.      Heart sounds: Normal heart sounds. No murmur.   Pulmonary:      Effort: Pulmonary effort is normal. No respiratory distress.      Breath sounds: Normal breath sounds. No wheezing.   Abdominal:      General: Bowel sounds are normal. There is no distension.      Palpations: Abdomen is soft. There is no hepatomegaly or splenomegaly.      Tenderness: There is no abdominal tenderness.   Lymphadenopathy:      Cervical: No cervical adenopathy.   Skin:     General: Skin is warm and dry.   Neurological:      Mental Status: He is alert and oriented to person, place, and time.   Psychiatric:         Behavior: Behavior normal.         Thought Content: Thought content normal.         Judgment: Judgment normal.           Results:       Results from this visit  Results for orders placed or performed in visit on 12/11/19   Lipid Cascade ([facility])     Status: Abnormal   Result Value Ref Range    Cholesterol 98.9 0.0 - 200.0 mg/dL    Cholesterol/HDL Ratio 2.6 0.0 - 5.0    HDL Cholesterol 38.4 (L) >40.0 mg/dL    Triglycerides 153.4 (H) 0.0 - 150.0 mg/dL    VLDL Cholesterol 30.7 7.0 - 32.0 mg/dL    LDL Cholesterol Calculated 30 0 - 129 mg/dL   Basic Metabolic Panel ([facility])     Status: Abnormal   Result Value Ref Range    Calcium 9.7 8.5 - 10.1 mg/dL    Chloride 99.4 98.0 - 110.0 mmol/L    Carbon Dioxide 27.3 20.0 - 32.0 mmol/L    Creatinine 0.9 0.7 - 1.3 mg/dL    Glucose 110.0 (H) 70.0 - 99.0 mg'dL    Potassium 4.2 3.3 - 4.5 mmol/dL    Sodium 140.4 132.6 - 141.4 mmol/L    GFR Estimate >90 >60.0 mL/min/1.7 m2    GFR Estimate If Black >90 >60.0 mL/min/1.7 m2    Urea Nitrogen 14.4 7.0 - 21.0 mg/dL       Options for treatment and follow-up care were reviewed with the patient. Lui Arrington  engaged in the decision making process and verbalized understanding of the options discussed and agreed with the final plan.  Richy Dorsey MD  [facility]

## 2019-12-11 NOTE — PATIENT INSTRUCTIONS
Here is the plan from today's visit    1. Coronary artery disease involving native coronary artery of native heart without angina pectoris       2. Screening for condition     - HIV Antigen Antibody Combo    3. Benign essential hypertension     - Basic Metabolic Panel ([facility])  - lisinopril (PRINIVIL/ZESTRIL) 10 MG tablet; Take 1 tablet (10 mg) by mouth daily  Dispense: 90 tablet; Refill: 3    4. Other hyperlipidemia     - Lipid Cascade ([facility])  - atorvastatin (LIPITOR) 80 MG tablet; Take 1 tablet (80 mg) by mouth every evening  Dispense: 90 tablet; Refill: 3    5. Vasculogenic erectile dysfunction, unspecified vasculogenic erectile dysfunction type       - sildenafil (REVATIO) 20 MG tablet; Take 1 tablet (20 mg) by mouth 3 times daily  Dispense: 30 tablet; Refill: 11      Please call or return to clinic if your symptoms don't go away.    Follow up plan      Thank you for coming to [facility] today.  Lab Testing:  **If you had lab testing today and your results are reassuring or normal they will be mailed to you or sent through [facility] within 7 days.   **If the lab tests need quick action we will call you with the results.  The phone number we will call with results is # [phone] (home) [phone] (work). If this is not the best number please call our clinic and change the number.  Medication Refills:  If you need any refills please call your pharmacy and they will contact us.   If you need to  your refill at a new pharmacy, please contact the new pharmacy directly. The new pharmacy will help you get your medications transferred faster.   Scheduling:  If you have any concerns about today's visit or wish to schedule another appointment please call our office during normal business hours [phone] (8-5:00 M-F)  If a referral was made to a [facility] and you don't get a call from central scheduling please call [phone].  If a Mammogram was ordered for you at [facility] call [phone] to schedule or change your appointment.  If you had an XRay/CT/Ultrasound/MRI ordered the number is [phone] to schedule or change your radiology appointment.   Medical Concerns:  If you have urgent medical concerns please call [phone] at any time of the day.    Richy Dorsey MD

## 2019-12-12 LAB — HIV 1+2 AB+HIV1 P24 AG SERPL QL IA: NONREACTIVE

## 2020-03-02 DIAGNOSIS — I10 ESSENTIAL HYPERTENSION: ICD-10-CM

## 2020-03-02 RX ORDER — AMLODIPINE BESYLATE 2.5 MG/1
2.5 TABLET ORAL DAILY
Qty: 90 TABLET | Refills: 0 | Status: SHIPPED | OUTPATIENT
Start: 2020-03-02 | End: 2020-06-22

## 2020-03-02 NOTE — TELEPHONE ENCOUNTER

## 2020-03-03 DIAGNOSIS — I10 BENIGN ESSENTIAL HYPERTENSION: ICD-10-CM

## 2020-03-03 NOTE — TELEPHONE ENCOUNTER
"Request for medication refill: Lisinopril 5mg    Providers if patient needs an appointment and you are willing to give a one month supply please refill for one month and  send a letter/MyChart using \".SMILLIMITEDREFILL\" .smillimited and route chart to \"P SMI \" (Giving one month refill in non controlled medications is strongly recommended before denial)    If refill has been denied, meaning absolutely no refills without visit, please complete the smart phrase \".smirxrefuse\" and route it to the \"P SMI MED REFILLS\"  pool to inform the patient and the pharmacy.    Pascale Braga, [facility]        "

## 2020-03-04 RX ORDER — LISINOPRIL 10 MG/1
10 TABLET ORAL DAILY
Qty: 90 TABLET | Refills: 3 | Status: SHIPPED | OUTPATIENT
Start: 2020-03-04 | End: 2021-05-21

## 2020-06-19 DIAGNOSIS — I10 ESSENTIAL HYPERTENSION: ICD-10-CM

## 2020-06-19 NOTE — TELEPHONE ENCOUNTER

## 2020-06-22 RX ORDER — AMLODIPINE BESYLATE 2.5 MG/1
2.5 TABLET ORAL DAILY
Qty: 90 TABLET | Refills: 1 | Status: SHIPPED | OUTPATIENT
Start: 2020-06-22 | End: 2020-06-29

## 2020-06-29 ENCOUNTER — MYC REFILL (OUTPATIENT)
Dept: FAMILY MEDICINE | Facility: CLINIC | Age: 50
End: 2020-06-29

## 2020-06-29 DIAGNOSIS — I10 ESSENTIAL HYPERTENSION: ICD-10-CM

## 2020-06-30 RX ORDER — AMLODIPINE BESYLATE 2.5 MG/1
2.5 TABLET ORAL DAILY
Qty: 90 TABLET | Refills: 1 | Status: SHIPPED | OUTPATIENT
Start: 2020-06-30 | End: 2020-12-21

## 2020-08-08 ENCOUNTER — VIRTUAL VISIT (OUTPATIENT)
Dept: FAMILY MEDICINE | Facility: OTHER | Age: 50
End: 2020-08-08
Payer: COMMERCIAL

## 2020-08-08 PROCEDURE — 99421 OL DIG E/M SVC 5-10 MIN: CPT | Performed by: FAMILY MEDICINE

## 2020-08-08 NOTE — PROGRESS NOTES
"Date: 2020 18:37:40  Clinician: Davon Thomas  Clinician NPI: [id number]  Patient: Lui Arrington  Patient : 1970  Patient Address: [address], [place] [address]  Patient Phone: [phone]  Visit Protocol: URI  Patient Summary:  Lui is a 50 year old ( : 1970 ) male who initiated a Visit for COVID-19 (Coronavirus) evaluation and screening. When asked the question \"Please sign me up to receive news, health information and promotions from [email or web address].\", Lui responded \"No\".    Lui states his symptoms started today.   His symptoms consist of a sore throat, ageusia, and a cough.   Symptom details     Cough: Lui coughs a few times an hour and his cough is not more bothersome at night. Phlegm comes into his throat when he coughs. He does not believe his cough is caused by post-nasal drip. The color of the phlegm is clear.     Sore throat: Lui reports having mild throat pain (1-3 on a 10 point pain scale), does not have exudate on his tonsils, and can swallow liquids. The lymph nodes in his neck are not enlarged. A rash has not appeared on the skin since the sore throat started.      Lui denies having ear pain, headache, chills, enlarged lymph nodes, nausea, teeth pain, diarrhea, nasal congestion, vomiting, rhinitis, myalgias, anosmia, facial pain or pressure, fever, wheezing, and malaise. He also denies taking antibiotic medication in the past month and having recent facial or sinus surgery in the past 60 days. He is not experiencing dyspnea.   Precipitating events  Within the past week, Lui has not been exposed to someone with strep throat. He has not recently been exposed to someone with influenza. Lui has not been in close contact with any high risk individuals.   Pertinent COVID-19 (Coronavirus) information  In the past 14 days, Lui has not worked in a congregate living setting.   He does not work or volunteer as healthcare worker or a  " and does not work or volunteer in a healthcare facility.   Lui also has not lived in a congregate living setting in the past 14 days. He does not live with a healthcare worker.   Lui has not had a close contact with a laboratory-confirmed COVID-19 patient within 14 days of symptom onset.   Since December 2019, Lui and has had upper respiratory infection (URI) or influenza-like illness. Has not been diagnosed with lab-confirmed COVID-19 test      Date(s) of previous URI or influenza-like illness (free-text): January 15-30     Symptoms Lui experienced during previous URI or influenza-like illness as reported by the patient (free-text): Cold symptoms - stuffy nose, cough, etc.        Pertinent medical history  Lui does not need a return to work/school note.   Weight: 273 lbs   Lui does not smoke or use smokeless tobacco.   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   Weight: 273 lbs    MEDICATIONS: aspirin oral, amlodipine oral, atorvastatin oral, lisinopril-hydrochlorothiazide oral, ALLERGIES: erythromycin base, ciprofloxacin  Clinician Response:  Dear Lui,   Your symptoms show that you may have coronavirus (COVID-19). This illness can cause fever, cough and trouble breathing. Many people get a mild case and get better on their own. Some people can get very sick.  What should I do?  We would like to test you for this virus.   1. Please call [phone] to schedule your visit. Explain that [facility]. Be ready to share your [facility] visit ID number.  The following will serve as your written order for this COVID Test, ordered by me, for the indication of suspected COVID [Z20.828]: The test will be ordered in [employer], our electronic health record, after you are scheduled. It will show as ordered and authorized by Gilbert Flaherty MD.   "Order: COVID-19 (Coronavirus) PCR [facility]TOMATIC [facility].      2. When it's time for your COVID test:  Stay at least 6 feet away from others. (If someone will drive you to your test, stay in the backseat, as far away from the  as you can.)   Cover your mouth and nose with a mask, tissue or washcloth.  Go straight to the testing site. Don't make any stops on the way there or back.      3.Starting now: Stay home and away from others (self-isolate) until:   You've had no fever---and no medicine that reduces fever---for one full day (24 hours). And...   Your other symptoms have gotten better. For example, your cough or breathing has improved. And...   At least 10 days have passed since your symptoms started.       During this time, don't leave the house except for testing or medical care.   Stay in your own room, even for meals. Use your own bathroom if you can.   Stay away from others in your home. No hugging, kissing or shaking hands. No visitors.  Don't go to work, school or anywhere else.    Clean \"high touch\" surfaces often (doorknobs, counters, handles, etc.). Use a household cleaning spray or wipes. You'll find a full list of  on the EPA website: www.epa.gov/pesticide-registration/list-n-disinfectants-use-against-sars-cov-2.   Cover your mouth and nose with a mask, tissue or washcloth to avoid spreading germs.  Wash your hands and face often. Use soap and water.  Caregivers in these groups are at risk for severe illness due to COVID-19:  o People 65 years and older  o People who live in a nursing home or long-term care facility  o People with chronic disease (lung, heart, cancer, diabetes, kidney, liver, immunologic)  o People who have a weakened immune system, including those who:   Are in cancer treatment  Take medicine that weakens the immune system, such as corticosteroids  Had a bone marrow or organ transplant  Have an immune deficiency  Have poorly controlled HIV or AIDS  Are " obese (body mass index of 40 or higher)  Smoke regularly   o Caregivers should wear gloves while washing dishes, handling laundry and cleaning bedrooms and bathrooms.  o Use caution when washing and drying laundry: Don't shake dirty laundry, and use the warmest water setting that you can.  o For more tips, go to www.cdc.gov/coronavirus/2019-ncov/downloads/10Things.pdf.    4.Sign up for [employer]. We know it's scary to hear that you might have COVID-19. We want to track your symptoms to make sure you're okay over the next 2 weeks. Please look for an email from [employer]---this is a free, online program that we'll use to keep in touch. To sign up, follow the link in the email. Learn more at [email or web address]  How can I take care of myself?   Get lots of rest. Drink extra fluids (unless a doctor has told you not to).   Take Tylenol (acetaminophen) for fever or pain. If you have liver or kidney problems, ask your family doctor if it's okay to take Tylenol.   Adults can take either:    650 mg (two 325 mg pills) every 4 to 6 hours, or...   1,000 mg (two 500 mg pills) every 8 hours as needed.    Note: Don't take more than 3,000 mg in one day. Acetaminophen is found in many medicines (both prescribed and over-the-counter medicines). Read all labels to be sure you don't take too much.   For children, check the Tylenol bottle for the right dose. The dose is based on the child's age or weight.    If you have other health problems (like cancer, heart failure, an organ transplant or severe kidney disease): Call your specialty clinic if you don't feel better in the next 2 days.       Know when to call 911. Emergency warning signs include:    Trouble breathing or shortness of breath Pain or pressure in the chest that doesn't go away Feeling confused like you haven't felt before, or not being able to wake up Bluish-colored lips or face.  Where can I get more information?   [facility] -- About COVID-19:  [email or web address]   CDC -- What to Do If You're Sick: www.cdc.gov/coronavirus/2019-ncov/about/steps-when-sick.html   CDC -- Ending Home Isolation: www.cdc.gov/coronavirus/2019-ncov/hcp/disposition-in-home-patients.html   CDC -- Caring for Someone: www.cdc.gov/coronavirus/2019-ncov/if-you-are-sick/care-for-someone.html   [facility] -- Interim Guidance for Hospital Discharge to Home: www.[facility].[place]./diseases/coronavirus/hcp/hospdischarge.pdf   [facility] clinical trials (COVID-19 research studies): clinicalaffairs.[facility]-clinical-trials    Below are the COVID-19 hotlines at the [place] Department of Health ([facility]). Interpreters are available.    For health questions: Call [phone] or 1-670.723.2912 (7 a.m. to 7 p.m.) For questions about schools and childcare: Call [phone] or 1-428.913.9099 (7 a.m. to 7 p.m.)    Diagnosis: Cough  Diagnosis ICD: R05

## 2020-08-09 ENCOUNTER — AMBULATORY - HEALTHEAST (OUTPATIENT)
Dept: FAMILY MEDICINE | Facility: CLINIC | Age: 50
End: 2020-08-09

## 2020-08-09 DIAGNOSIS — Z20.822 SUSPECTED COVID-19 VIRUS INFECTION: ICD-10-CM

## 2020-08-11 ENCOUNTER — COMMUNICATION - HEALTHEAST (OUTPATIENT)
Dept: SCHEDULING | Facility: CLINIC | Age: 50
End: 2020-08-11

## 2020-08-12 ENCOUNTER — COMMUNICATION - HEALTHEAST (OUTPATIENT)
Dept: SCHEDULING | Facility: CLINIC | Age: 50
End: 2020-08-12

## 2020-08-12 DIAGNOSIS — I67.1 CEREBRAL ANEURYSM, NONRUPTURED: Primary | ICD-10-CM

## 2020-08-13 ENCOUNTER — COMMUNICATION - HEALTHEAST (OUTPATIENT)
Dept: SCHEDULING | Facility: CLINIC | Age: 50
End: 2020-08-13

## 2020-08-13 ENCOUNTER — NURSE TRIAGE (OUTPATIENT)
Dept: NURSING | Facility: CLINIC | Age: 50
End: 2020-08-13

## 2020-08-13 NOTE — TELEPHONE ENCOUNTER
Calling for COVID test results: see HE Epic chart. CLG    Reason for Disposition    Caller requesting lab results    Additional Information    Negative: Lab calling with strep throat test results and triager can call in prescription    Negative: Lab calling with urinalysis test results and triager can call in prescription    Negative: Medication questions    Negative: ED call to PCP    Negative: Physician call to PCP    Negative: Call about patient who is currently hospitalized    Negative: Lab or radiology calling with CRITICAL test results    Negative: [1] Prescription not at pharmacy AND [2] was prescribed today by PCP    Negative: [1] Follow-up call from patient regarding patient's clinical status AND [2] information urgent    Negative: [1] Caller requests to speak ONLY to PCP AND [2] urgent question    Negative: [1] Caller requests to speak to PCP now AND [2] won't tell us reason for call  (Exception: if 10 pm to 6 am, caller must first discuss reason for the call)    Negative: Notification of hospital admission    Negative: Notification of death    Protocols used: [facility] CALL - [facility]

## 2020-10-29 ENCOUNTER — VIRTUAL VISIT (OUTPATIENT)
Dept: FAMILY MEDICINE | Facility: CLINIC | Age: 50
End: 2020-10-29
Payer: COMMERCIAL

## 2020-10-29 DIAGNOSIS — H66.001 NON-RECURRENT ACUTE SUPPURATIVE OTITIS MEDIA OF RIGHT EAR WITHOUT SPONTANEOUS RUPTURE OF TYMPANIC MEMBRANE: Primary | ICD-10-CM

## 2020-10-29 DIAGNOSIS — N52.9 VASCULOGENIC ERECTILE DYSFUNCTION, UNSPECIFIED VASCULOGENIC ERECTILE DYSFUNCTION TYPE: ICD-10-CM

## 2020-10-29 PROCEDURE — 99214 OFFICE O/P EST MOD 30 MIN: CPT | Mod: 95 | Performed by: FAMILY MEDICINE

## 2020-10-29 RX ORDER — SILDENAFIL CITRATE 20 MG/1
20 TABLET ORAL PRN
Qty: 30 TABLET | Refills: 11 | Status: SHIPPED | OUTPATIENT
Start: 2020-10-29 | End: 2022-08-11

## 2020-10-29 NOTE — PROGRESS NOTES
"Family Medicine Video Visit Note  Adam is being evaluated via a billable video visit.               Video Visit Consent     Patient was verbally read the following and verbal consent was obtained.  \"Video visits are billed at different rates depending on your insurance coverage. During this emergency period, for some insurers they may be billed the same as an in-person visit.  Please reach out to your insurance provider with any questions.  If during the course of the call the physician/provider feels a video visit is not appropriate, you will not be charged for this service.\"     (Name person giving consent:  Patient   Date verbal consent given:  10/29/2020  Time verbal consent given:  8:34 AM)       Chief Complaint   Patient presents with     Nose Problem                      HPI       Video Start Time: 8:20 AM    Adam presents to clinic today for the following health issues:    Right-sided congestion  Patient presented with right-sided congestion focused on the ear with decreased hearing tinnitus, some mild pain on the right side and inability to completely clear his ear.  He describes the tinnitus as a refrigerator running.  He denies fever cough sinus pain though he does report sinus pressure.  No significant headache visual disturbance or balance disturbance.  He has treated this with Afrin sparingly, he is unable to tolerate cetirizine or Allegra loratadine has no effect.  He has also tried Flonase and this is also been ineffective.  He does report some right-sided jaw pain he does not know if he grinds his teeth.  We also briefly discussed erectile dysfunction 20 mg tablets are working well for him and is satisfied with the effect.  Also essential hypertension we discussed and his blood pressure is well controlled on current medications.  Current Outpatient Medications   Medication Sig Dispense Refill     amoxicillin-clavulanate (AUGMENTIN) 875-125 MG tablet Take 1 tablet by mouth 2 times daily for 10 days " "20 tablet 0     sildenafil (REVATIO) 20 MG tablet Take 1 tablet (20 mg) by mouth as needed (ED) 30 tablet 11     amLODIPine (NORVASC) 2.5 MG tablet Take 1 tablet (2.5 mg) by mouth daily 90 tablet 1     aspirin 81 MG EC tablet Take 1 tablet (81 mg) by mouth daily 90 tablet 3     atorvastatin (LIPITOR) 40 MG tablet Take 1 tablet (40 mg) by mouth every evening 90 tablet 3     cetirizine (ZYRTEC) 10 MG tablet Take 10 mg by mouth daily       FLUCELVAX QUADRIVALENT 0.5 ML NAZIA Inject 1 ml given Subcutaneous once  0     fluticasone (FLONASE) 50 MCG/ACT nasal spray Spray 1 spray into both nostrils daily       lisinopril (ZESTRIL) 10 MG tablet Take 1 tablet (10 mg) by mouth daily 90 tablet 3     Vitamin D, Cholecalciferol, 25 MCG (1000 UT) CAPS        Allergies   Allergen Reactions     Ciprofloxacin Hives     Erythromycin      Seasonal Allergies               Review of Systems:     No other complaints         Physical Exam:     There were no vitals taken for this visit.  Estimated body mass index is 35.22 kg/m  as calculated from the following:    Height as of 5/21/19: 1.905 m (6' 3\").    Weight as of 12/11/19: 127.8 kg (281 lb 12.8 oz).    GENERAL: Healthy, alert and no distress  EYES: Eyes grossly normal to inspection.  No discharge or erythema, or obvious scleral/conjunctival abnormalities.  RESP: No audible wheeze, cough, or visible cyanosis.  No visible retractions or increased work of breathing.    SKIN: Visible skin clear. No significant rash, abnormal pigmentation or lesions.  NEURO: Cranial nerves grossly intact.  Mentation and speech appropriate for age.  PSYCH: Mentation appears normal, affect normal/bright, judgement and insight intact, normal speech and appearance well-groomed.            Assessment and Plan   1. Vasculogenic erectile dysfunction, unspecified vasculogenic erectile dysfunction type  This was a refill and clarification of the medication.  - sildenafil (REVATIO) 20 MG tablet; Take 1 tablet (20 mg) " by mouth as needed (ED)  Dispense: 30 tablet; Refill: 11    2. Non-recurrent acute suppurative otitis media of right ear without spontaneous rupture of tympanic membrane  Discussion of this congestion tinnitus and hearing loss we decided that this could be a otitis media and we will treat accordingly for 1 week.  If there is no improvement we will continue antibiotics and get a CT scan and referral to ENT.  Other considerations is sinusitis including ethmoid sinusitis.  - amoxicillin-clavulanate (AUGMENTIN) 875-125 MG tablet; Take 1 tablet by mouth 2 times daily for 10 days  Dispense: 20 tablet; Refill: 0    Refilled medications that would be required in the next 3 months.     After Visit Information:  Patient chose to view AVS via [employer]      No follow-ups on file.      Video-Visit Details    Type of service:  Video Visit    Video End Time (time video stopped): 8:56 AM    Originating Location (pt. Location): Home    Distant Location (provider location):  [facility]     Platform used for Video Visit: Rosamaria Dorsey MD

## 2020-10-29 NOTE — PATIENT INSTRUCTIONS
Here is the plan from today's visit    1. Vasculogenic erectile dysfunction, unspecified vasculogenic erectile dysfunction type  Corrected prescription as we discussed  - sildenafil (REVATIO) 20 MG tablet; Take 1 tablet (20 mg) by mouth as needed (ED)  Dispense: 30 tablet; Refill: 11    2. Non-recurrent acute suppurative otitis media of right ear without spontaneous rupture of tympanic membrane  As we talked about we will start out with this an antibiotic and a use pseudoephedrine 120 mg every 12 hours.  Also may use Afrin for up to 3 to 5days every 12 hours.  If your symptoms are significantly improved especially the hearing in the tinnitus then we can continue antibiotic treatment if not improved I would continue antibiotic treatment and contact me by my chart and I will order a CT scan and an ENT consult.  - amoxicillin-clavulanate (AUGMENTIN) 875-125 MG tablet; Take 1 tablet by mouth 2 times daily for 10 days  Dispense: 20 tablet; Refill: 0      Please call or return to clinic if your symptoms don't go away.         Thank you for coming to [facility] today.  Lab Testing:  **If you had lab testing today and your results are reassuring or normal they will be mailed to you or sent through [email or web address] within 7 days.   **If the lab tests need quick action we will call you with the results.  The phone number we will call with results is # [phone] (home) [phone] (work). If this is not the best number please call our clinic and change the number.  Medication Refills:  If you need any refills please call your pharmacy and they will contact us.   If you need to  your refill at a new pharmacy, please contact the new pharmacy directly. The new pharmacy will help you get your medications transferred faster.   Scheduling:  If you have any concerns about today's visit or wish to schedule another appointment please call our office during normal business hours [phone] (8-5:00 M-F)   [facility] [place]  [facility] please call [phone].   Mammogram Scheduling [phone]     XRay/CT/Ultrasound/MRI Scheduling [phone]    Medical Concerns:  If you have urgent medical concerns please call [phone] at any time of the day.    Richy Dorsey MD

## 2020-11-05 ENCOUNTER — MYC MEDICAL ADVICE (OUTPATIENT)
Dept: FAMILY MEDICINE | Facility: CLINIC | Age: 50
End: 2020-11-05

## 2020-11-05 DIAGNOSIS — R09.81 CONGESTION OF PARANASAL SINUS: Primary | ICD-10-CM

## 2020-11-05 DIAGNOSIS — H90.2 CONDUCTIVE HEARING LOSS, UNILATERAL: ICD-10-CM

## 2020-11-07 ENCOUNTER — ANCILLARY PROCEDURE (OUTPATIENT)
Dept: CT IMAGING | Facility: CLINIC | Age: 50
End: 2020-11-07
Attending: FAMILY MEDICINE
Payer: COMMERCIAL

## 2020-11-07 DIAGNOSIS — R09.81 CONGESTION OF PARANASAL SINUS: ICD-10-CM

## 2020-11-07 PROCEDURE — 70470 CT HEAD/BRAIN W/O & W/DYE: CPT | Performed by: RADIOLOGY

## 2020-11-07 RX ORDER — IOPAMIDOL 755 MG/ML
75 INJECTION, SOLUTION INTRAVASCULAR ONCE
Status: COMPLETED | OUTPATIENT
Start: 2020-11-07 | End: 2020-11-07

## 2020-11-07 RX ADMIN — IOPAMIDOL 75 ML: 755 INJECTION, SOLUTION INTRAVASCULAR at 09:39

## 2020-11-08 NOTE — RESULT ENCOUNTER NOTE
Dear Adam  Here are your CT results as you can see the CT does not show any significant Sinus problems.  My suggested plan is to follow through with seeing the ENT .  Please message me if you have any concerns.  Ender Dorsey MD

## 2020-11-11 NOTE — TELEPHONE ENCOUNTER
FUTURE VISIT INFORMATION      FUTURE VISIT INFORMATION:    Date: 11/23/2020    Time: 10:30AM    Location: [facility]  REFERRAL INFORMATION:    Referring provider:  Richy Dorsey MD    Referring providers clinic:  [facility]    Reason for visit/diagnosis  Referred by : Richy Dorsey MD in [facility] Referred for : Congestion of paranasal sinus Referral attached to Dr Nissen Appt on 12/15/20 - Referral was for more than one Dx (Pt knows this one is for Nose only) Scheduled by Pt No outside Records - Per Pt All Records in [facility]      RECORDS REQUESTED FROM:       Clinic name Comments Records Status Imaging Status   [facility] 11/5/2020 referral from Richy Dorsey MD  10/29/2020 note from Richy Dorsey MD EPIC    Imaging 11/7/2020 CT Head   10/14/2019 MRA Brain   9/4/2018 MR Brain  [facility] PACS

## 2020-11-11 NOTE — TELEPHONE ENCOUNTER
FUTURE VISIT INFORMATION      FUTURE VISIT INFORMATION:    Date: 12/15/2020    Time: 2:30PM    Location: [facility]  REFERRAL INFORMATION:    Referring provider:  Richy Dorsey MD    Referring providers clinic:  [facility]    Reason for visit/diagnosis  Referred by : Richy Dorsey MD in [facility] Referred for : Congestion of paranasal sinus Referral attached to Dr Nissen Appt on 12/15/20 - Referral was for more than one Dx (Pt knows this one is for Nose only) Scheduled by Pt No outside Records - Per Pt All Records in [facility]      RECORDS REQUESTED FROM:       Clinic name Comments Records Status Imaging Status   [facility] 11/5/2020 referral from Richy Dorsey MD  10/29/2020 note from Richy Dorsey MD EPIC    Imaging 11/7/2020 CT Head   10/14/2019 MRA Brain   9/4/2018 MR Brain  [facility] PACS

## 2020-11-14 ENCOUNTER — HEALTH MAINTENANCE LETTER (OUTPATIENT)
Age: 50
End: 2020-11-14

## 2020-11-23 ENCOUNTER — PRE VISIT (OUTPATIENT)
Dept: OTOLARYNGOLOGY | Facility: CLINIC | Age: 50
End: 2020-11-23

## 2020-11-23 ENCOUNTER — OFFICE VISIT (OUTPATIENT)
Dept: OTOLARYNGOLOGY | Facility: CLINIC | Age: 50
End: 2020-11-23
Payer: COMMERCIAL

## 2020-11-23 VITALS — HEART RATE: 64 BPM | OXYGEN SATURATION: 100 % | BODY MASS INDEX: 35.37 KG/M2 | TEMPERATURE: 97.6 F | WEIGHT: 283 LBS

## 2020-11-23 DIAGNOSIS — J31.0 CHRONIC RHINITIS: Primary | ICD-10-CM

## 2020-11-23 DIAGNOSIS — H61.21 IMPACTED CERUMEN OF RIGHT EAR: ICD-10-CM

## 2020-11-23 PROCEDURE — 99203 OFFICE O/P NEW LOW 30 MIN: CPT | Mod: 25 | Performed by: OTOLARYNGOLOGY

## 2020-11-23 PROCEDURE — 69210 REMOVE IMPACTED EAR WAX UNI: CPT | Performed by: OTOLARYNGOLOGY

## 2020-11-23 RX ORDER — AZELASTINE 1 MG/ML
1 SPRAY, METERED NASAL 2 TIMES DAILY
Qty: 30 ML | Refills: 11 | Status: SHIPPED | OUTPATIENT
Start: 2020-11-23 | End: 2022-05-17

## 2020-11-23 ASSESSMENT — PAIN SCALES - GENERAL: PAINLEVEL: MILD PAIN (3)

## 2020-11-23 NOTE — PROGRESS NOTES
HISTORY OF PRESENT ILLNESS:  Lui Arrington is seen here today for the first time.  He is a very pleasant 50-year-old gentleman who comes in with a number of different symptoms.  First off, he has had chronic problems over a number of years with nasal congestion and frontal sinus pressure.  He states that he gets some relief using Sudafed and occasionally using Afrin, but the symptoms are fairly persistent when not actively using his medications.  He also reports a month long history of right-sided tinnitus and diminished hearing.  He feels that this has come on gradually.  He does not report any vertigo or otorrhea.  He does have a history of otitis as a child.  With regards to his nose, he has tried oral antihistamines, but finds that these are too hard for him to tolerate.  He does take Flonase once a day with regularity.  He has not had any previous surgical intervention in his nose.      PAST MEDICAL HISTORY:  Noted and reviewed in the chart.      PAST SURGICAL HISTORY: Noted and reviewed in the chart.      FAMILY HISTORY:  Noted and reviewed in the chart.      SOCIAL HISTORY:  Noted and reviewed in the chart.      REVIEW OF SYSTEMS:  Pertinent positives and negatives as above.  Otherwise, complete review of systems noted and reviewed in the chart.      PHYSICAL EXAMINATION:  This is a 50-year-old gentleman in no acute distress.  Normal mood, normal affect, normal ability to communicate.  Alert and appropriate.  Head is alert and appropriate.  Head is normocephalic.  Cranial nerves VII is House-Brackmann I out of VI bilaterally.  Breathing without difficulty or stridor.  Eyes are anicteric.  Skin of the head and neck appears normal.  Examination of the ears shows normal left tympanic membrane.  Right tympanic membrane completely obstructed and occluded by cerumen, which is removed under the microscope using alligator forceps, which the patient then experiences as a resolution of his otologic symptoms.  The  patient's right tympanic membrane is normal.  Oral cavity shows normal tongue, buccal mucosa and oropharynx.  Anterior nasal exam demonstrates bilateral inferior turbinate hypertrophy without any evidence of polyps or purulence.      ASSESSMENT:  A 50-year-old with resolved tinnitus and in hearing issues on the right side secondary to cerumen which is cleaned here today.        PLAN:  With regards to his chronic nasal congestion and rhinitis, at this point, we will recommend increasing his Flonase to twice a day, adding Astelin and seeing him back in a month.  He has previously had a CT scan of his head which did not demonstrate any substantial sinus obstruction, so the next step may be to consider turbinate reduction should he continue to have symptoms.  We will discuss this further with him at his followup visit should he still has problems with his nose.

## 2020-11-23 NOTE — NURSING NOTE
Chief Complaint   Patient presents with     Consult     Sinus congestion         Pulse 64, temperature 97.6  F (36.4  C), weight 128.4 kg (283 lb), SpO2 100 %.    Christina Gerard, EMT

## 2020-11-23 NOTE — LETTER
11/23/2020       RE: Lui Arrington  [address] [place] [address]     Dear Colleague,    Thank you for referring your patient, Lui Arrington, to the [facility] [place] at [facility]. Please see a copy of my visit note below.    HISTORY OF PRESENT ILLNESS:  Lui Arrington is seen here today for the first time.  He is a very pleasant 50-year-old gentleman who comes in with a number of different symptoms.  First off, he has had chronic problems over a number of years with nasal congestion and frontal sinus pressure.  He states that he gets some relief using Sudafed and occasionally using Afrin, but the symptoms are fairly persistent when not actively using his medications.  He also reports a month long history of right-sided tinnitus and diminished hearing.  He feels that this has come on gradually.  He does not report any vertigo or otorrhea.  He does have a history of otitis as a child.  With regards to his nose, he has tried oral antihistamines, but finds that these are too hard for him to tolerate.  He does take Flonase once a day with regularity.  He has not had any previous surgical intervention in his nose.      PAST MEDICAL HISTORY:  Noted and reviewed in the chart.      PAST SURGICAL HISTORY: Noted and reviewed in the chart.      FAMILY HISTORY:  Noted and reviewed in the chart.      SOCIAL HISTORY:  Noted and reviewed in the chart.      REVIEW OF SYSTEMS:  Pertinent positives and negatives as above.  Otherwise, complete review of systems noted and reviewed in the chart.      PHYSICAL EXAMINATION:  This is a 50-year-old gentleman in no acute distress.  Normal mood, normal affect, normal ability to communicate.  Alert and appropriate.  Head is alert and appropriate.  Head is normocephalic.  Cranial nerves VII is House-Brackmann I out of VI bilaterally.  Breathing without difficulty or stridor.  Eyes are anicteric.   Skin of the head and neck appears normal.  Examination of the ears shows normal left tympanic membrane.  Right tympanic membrane completely obstructed and occluded by cerumen, which is removed under the microscope using alligator forceps, which the patient then experiences as a resolution of his otologic symptoms.  The patient's right tympanic membrane is normal.  Oral cavity shows normal tongue, buccal mucosa and oropharynx.  Anterior nasal exam demonstrates bilateral inferior turbinate hypertrophy without any evidence of polyps or purulence.      ASSESSMENT:  A 50-year-old with resolved tinnitus and in hearing issues on the right side secondary to cerumen which is cleaned here today.        PLAN:  With regards to his chronic nasal congestion and rhinitis, at this point, we will recommend increasing his Flonase to twice a day, adding Astelin and seeing him back in a month.  He has previously had a CT scan of his head which did not demonstrate any substantial sinus obstruction, so the next step may be to consider turbinate reduction should he continue to have symptoms.  We will discuss this further with him at his followup visit should he still has problems with his nose.           Again, thank you for allowing me to participate in the care of your patient.      Sincerely,    Phoenix Garcia MD

## 2020-12-15 ENCOUNTER — PRE VISIT (OUTPATIENT)
Dept: OTOLARYNGOLOGY | Facility: CLINIC | Age: 50
End: 2020-12-15

## 2020-12-21 DIAGNOSIS — I10 ESSENTIAL HYPERTENSION: ICD-10-CM

## 2020-12-21 RX ORDER — AMLODIPINE BESYLATE 2.5 MG/1
2.5 TABLET ORAL DAILY
Qty: 90 TABLET | Refills: 1 | Status: SHIPPED | OUTPATIENT
Start: 2020-12-21 | End: 2021-07-06

## 2020-12-21 NOTE — TELEPHONE ENCOUNTER

## 2020-12-28 ENCOUNTER — OFFICE VISIT (OUTPATIENT)
Dept: OTOLARYNGOLOGY | Facility: CLINIC | Age: 50
End: 2020-12-28
Payer: COMMERCIAL

## 2020-12-28 ENCOUNTER — PREP FOR PROCEDURE (OUTPATIENT)
Dept: OTOLARYNGOLOGY | Facility: CLINIC | Age: 50
End: 2020-12-28

## 2020-12-28 VITALS — HEIGHT: 75 IN | OXYGEN SATURATION: 98 % | BODY MASS INDEX: 35.56 KG/M2 | WEIGHT: 286 LBS | HEART RATE: 58 BPM

## 2020-12-28 DIAGNOSIS — J34.89 NASAL OBSTRUCTION: ICD-10-CM

## 2020-12-28 DIAGNOSIS — J34.89 NASAL OBSTRUCTION: Primary | ICD-10-CM

## 2020-12-28 DIAGNOSIS — J34.3 HYPERTROPHY OF BOTH INFERIOR NASAL TURBINATES: Primary | ICD-10-CM

## 2020-12-28 PROCEDURE — 99213 OFFICE O/P EST LOW 20 MIN: CPT | Performed by: OTOLARYNGOLOGY

## 2020-12-28 RX ORDER — DEXAMETHASONE SODIUM PHOSPHATE 4 MG/ML
10 INJECTION, SOLUTION INTRA-ARTICULAR; INTRALESIONAL; INTRAMUSCULAR; INTRAVENOUS; SOFT TISSUE ONCE
Status: CANCELLED | OUTPATIENT
Start: 2020-12-28 | End: 2020-12-28

## 2020-12-28 ASSESSMENT — MIFFLIN-ST. JEOR: SCORE: 2232.92

## 2020-12-28 ASSESSMENT — PAIN SCALES - GENERAL: PAINLEVEL: MILD PAIN (3)

## 2020-12-28 NOTE — NURSING NOTE
"Chief Complaint   Patient presents with     RECHECK     1 month follow up       Pulse 58, height 1.905 m (6' 3\"), weight 129.7 kg (286 lb), SpO2 98 %.    Samira Granado, EMT  "

## 2020-12-28 NOTE — PATIENT INSTRUCTIONS
1.  You were seen in the ENT Clinic today by Dr. Garcia.  If you have any questions or concerns after your appointment, please call [phone].    2.  Plan is to move forward with a turbinate reduction. This is an outpatient procedure that is done in the operating room. You will need a  the day of surgery.    3. You will need to consult with your primary care MD for a pre-op physical.  Forms for this were sent home with you today.    4. Asha, our surgery scheduler will call you to schedule surgery. You can also reach her at [phone]    5.  Please return to the clinic 1 week after surgery       Please call our clinic for any questions, concerns, and/or worsening symptoms.      Clinic #[phone]       Option 1 for scheduling.    Thank you for allowing us to be a part of your care!    Amanda RIVAS RNCC    If you need to reach me my direct line is: [phone]         Patient Education     Nasal Surgery: Turbinate Surgery     During surgery, bone or mucous membrane may be removed from enlarged turbinates.   You re scheduled to have nasal surgery. The type of nasal surgery you re having is called turbinate surgery. Read on to learn more about what to expect during this surgery.  What are the turbinates?  The turbinates are located on the inside of each side of your nose. They are curved bony ridges that are lined with mucous membrane. Mucous membrane is thin tissue that also lines the insides of your sinuses and throat. It makes sticky mucus that helps clean the air in the nose of dust and other small particles. The turbinates and mucous membrane warm and moisten the air you breathe through your nose.  What to expect during turbinate surgery  This surgery fixes a blockage caused by enlarged turbinates. The surgeon makes cuts (incisions) inside the nose under the lower turbinate. The surgeon may use a laser to do this. He or she may remove extra bone to make the turbinate smaller. Sometimes the surgeon  also removes excess mucous membrane.  Risks and possible complications  As with any surgery, nasal surgery has some risks. These include a slight risk of bleeding and infection. Your doctor will discuss any other risks and complications with you.   After turbinate surgery  After turbinate surgery, you ll be taken to a recovery area or to your hospital room. Your experience may be as follows:    You may have packing or a plastic splint inside your nose if you had a septoplasty or sinus surgery along with the turbinate surgery.    You may also have bandages (dressings) on the outside of your nose.    It s normal to have some mucus and blood drain from your nose. Until packing is removed, you may have to breathe through your mouth.    Expect some throat dryness and irritation. This is normal after general anesthesia or having a tube down your throat.    Pain medicine will be prescribed as needed. Don t take medicine containing aspirin or ibuprofen. These can increase bleeding.  Follow-up  You ll need to follow up with your doctor within a week after your surgery. Here is what to expect:    Any packing, splint, or dressings will probably be removed. You may feel slight discomfort and bleed a little when this is done.    After the splint or packing is removed, you ll most likely breathe better than you did before surgery.    You may have minor numbness, pain, swelling, and a little stiffness under the tip of the nose.    In a few days, the inside of your nose may swell and briefly block your breathing. Or a scab or crust may block breathing for a short time. Leave the scab alone. Your doctor can remove it. Using a saline solution in your nose can help reduce and remove crusts.    Contact your healthcare provider if you have any questions, concerns, or unusual symptoms when you get home.  [employer] last reviewed this educational content on 11/1/2016 2000-2020 [employer] [address] [facility]  [place] [address]. All rights reserved. This information is not intended as a substitute for professional medical care. Always follow your healthcare professional's instructions.

## 2020-12-28 NOTE — PROGRESS NOTES
HISTORY OF PRESENT ILLNESS:  Adam is here to see us again today.  His ear has been doing well since the cerumen has been removed.  He has gotten some relief of his nasal congestion using the Flonase and Astelin spray, but if he does not keep up with it consistently, his symptoms become worse and his nose feels congested again.      PHYSICAL EXAMINATION:  On examination today, he is in no distress, alert and interactive.  Breathing without difficulty or stridor.  Eyes are anicteric.  Skin of the head and neck appears normal.  Examination of the ears shows up sloping and moderately narrowed external auditory canals, normal tympanic membranes with no cerumen accumulation.  Anterior nasal exam shows straight septum with moderate turbinate hypertrophy.      ASSESSMENT:  This is a 51-year-old gentleman with nasal congestion secondary to turbinate hypertrophy.  We discussed the pros and cons of a turbinate reduction versus continued medical management.  He is interested in a more permanent solution.        PLAN:  Therefore, we discussed a turbinate reduction techniques and he is interested in a submucosal resection in the operating room.  We will schedule this at his convenience.  In addition, he does have a tendency to cerumen impactions and we will examine his ears again next time he us back in clinic.

## 2020-12-28 NOTE — NURSING NOTE
Pre-surgery teaching completed for: Bilateral turbinate reduction   Physician:  Dr. Phoenix Garcia    Teaching completed via phone: No  Teaching completed in clinic:  Yes    Teaching completed with patient   present No    Surgical booklet given  Yes  Pre-surgery scrub given Yes    Pneumovax guidelines given:  Not applicable    Reviewed pre-surgical guidelines: Yes    Pre-surgery physical exam requirements:  Yes  NPO requirements: Yes    Reviewed post surgery expectations: Yes    Recommended post surgery follow up:  1 week    Amanda Peoples RN

## 2020-12-28 NOTE — LETTER
12/28/2020       RE: uLi Arrington  [address] [place] [address]     Dear Colleague,    Thank you for referring your patient, Lui Arrington, to the [facility] NOSE AND THROAT CLINIC [place] at [facility]. Please see a copy of my visit note below.    HISTORY OF PRESENT ILLNESS:  Adam is here to see us again today.  His ear has been doing well since the cerumen has been removed.  He has gotten some relief of his nasal congestion using the Flonase and Astelin spray, but if he does not keep up with it consistently, his symptoms become worse and his nose feels congested again.      PHYSICAL EXAMINATION:  On examination today, he is in no distress, alert and interactive.  Breathing without difficulty or stridor.  Eyes are anicteric.  Skin of the head and neck appears normal.  Examination of the ears shows up sloping and moderately narrowed external auditory canals, normal tympanic membranes with no cerumen accumulation.  Anterior nasal exam shows straight septum with moderate turbinate hypertrophy.      ASSESSMENT:  This is a 51-year-old gentleman with nasal congestion secondary to turbinate hypertrophy.  We discussed the pros and cons of a turbinate reduction versus continued medical management.  He is interested in a more permanent solution.        PLAN:  Therefore, we discussed a turbinate reduction techniques and he is interested in a submucosal resection in the operating room.  We will schedule this at his convenience.  In addition, he does have a tendency to cerumen impactions and we will examine his ears again next time he us back in clinic.     Again, thank you for allowing me to participate in the care of your patient.      Sincerely,    Phoenix Garcia MD

## 2020-12-30 ENCOUNTER — TELEPHONE (OUTPATIENT)
Dept: OTOLARYNGOLOGY | Facility: CLINIC | Age: 50
End: 2020-12-30

## 2020-12-30 NOTE — TELEPHONE ENCOUNTER
Left message regarding scheduling surgery with Dr. Garcia. Call back number left on voicemail [phone].       Asha Gerard   Perioperative Coordinator  Department of Otolaryngology    Office: [phone]

## 2021-02-24 ENCOUNTER — MYC MEDICAL ADVICE (OUTPATIENT)
Dept: OTOLARYNGOLOGY | Facility: CLINIC | Age: 51
End: 2021-02-24

## 2021-02-26 NOTE — TELEPHONE ENCOUNTER
Left message regarding scheduling surgery with Dr. Garcia. Call back number left on voicemail.       Asha Gerard on 2/26/2021 at 2:34 PM

## 2021-03-02 NOTE — TELEPHONE ENCOUNTER
Left message regarding scheduling surgery/procedure with Dr. Garcia. Writer left call back number on the patients voicemail. Informed patient that writer will send a [employer] message with Dr. Garcia's availability to hopefully connect that way.       Asha Gerard on 3/2/2021 at 4:14 PM   P: [phone]

## 2021-03-16 DIAGNOSIS — E78.49 OTHER HYPERLIPIDEMIA: ICD-10-CM

## 2021-03-16 RX ORDER — ATORVASTATIN CALCIUM 40 MG/1
40 TABLET, FILM COATED ORAL EVERY EVENING
Qty: 90 TABLET | Refills: 0 | Status: SHIPPED | OUTPATIENT
Start: 2021-03-16 | End: 2021-06-15

## 2021-03-16 NOTE — TELEPHONE ENCOUNTER

## 2021-05-21 DIAGNOSIS — I10 BENIGN ESSENTIAL HYPERTENSION: ICD-10-CM

## 2021-05-21 RX ORDER — LISINOPRIL 10 MG/1
10 TABLET ORAL DAILY
Qty: 90 TABLET | Refills: 3 | Status: SHIPPED | OUTPATIENT
Start: 2021-05-21 | End: 2021-11-19

## 2021-05-21 NOTE — TELEPHONE ENCOUNTER
"Request for medication refill:  lisinopril (ZESTRIL) 10 MG tablet    Providers if patient needs an appointment and you are willing to give a one month supply please refill for one month and  send a letter/MyChart using \".SMILLIMITEDREFILL\" .smillimited and route chart to \"P [facility] \" (Giving one month refill in non controlled medications is strongly recommended before denial)    If refill has been denied, meaning absolutely no refills without visit, please complete the smart phrase \".smirxrefuse\" and route it to the \"P [facility] MED REFILLS\"  pool to inform the patient and the pharmacy.    Zandra Gu MA        "

## 2021-06-10 NOTE — TELEPHONE ENCOUNTER
Coronavirus (COVID-19) Notification    Lab Result   Lab test 2019-nCoV rRt-PCR OR SARS-COV-2 PCR    Nasopharyngeal AND/OR Oropharyngeal swab is NEGATIVE for 2019-nCoV RNA [OR] SARS-COV-2 RNA (COVID-19) RNA    Your result was negative. This means that we didn't find the virus that causes COVID-19 in your sample. A test may show negative when you do actually have the virus. This can happen when the virus is in the early stages of infection, before you feel illness symptoms.    If you have symptoms   Stay home and away from others (self-isolate) until you meet ALL of the guidelines below:    You've had no fever--and no medicine that reduces fever--for 3 full days (72 hours). And      Your other symptoms have gotten better. For example, your cough or breathing has improved. And     At least 10 days have passed since your symptoms started.    During this time:    Stay home. Don't go to work, school or anywhere else.     Stay in your own room, including for meals. Use your own bathroom if you can.    Stay away from others in your home. No hugging, kissing or shaking hands. No visitors.    Clean  high touch  surfaces often (doorknobs, counters, handles, etc.). Use a household cleaning spray or wipes. You can find a full list on the EPA website at www.epa.gov/pesticide-registration/list-n-disinfectants-use-against-sars-cov-2.    Cover your mouth and nose with a mask, tissue or washcloth to avoid spreading germs.    Wash your hands and face often with soap and water.    Going back to work  Check with your employer for any guidelines to follow for going back to work.  You are sent a letter for your Employer which will serve as formal document notice that you, the employee, tested negative for COVID-19, as of the testing date shown above.    If your symptoms worsen or other concerning symptoms, contact PCP, oncare or consider returning to Emergency Dept.    Where can I get more information?    [facility]:  [email or web address]    Coronavirus Basics: [email or web address]/[email or web address]/[email or web address]    [facility] Hotline ([phone])    Kaela Gonzales RN Triage Nurse Advisor

## 2021-06-15 DIAGNOSIS — E78.49 OTHER HYPERLIPIDEMIA: ICD-10-CM

## 2021-06-15 RX ORDER — ATORVASTATIN CALCIUM 40 MG/1
40 TABLET, FILM COATED ORAL EVERY EVENING
Qty: 90 TABLET | Refills: 0 | Status: SHIPPED | OUTPATIENT
Start: 2021-06-15 | End: 2021-11-19

## 2021-06-15 NOTE — TELEPHONE ENCOUNTER
"Request for medication refill:    Atorvastatin 40 mg     Providers if patient needs an appointment and you are willing to give a one month supply please refill for one month and  send a letter/MyChart using \".SMILLIMITEDREFILL\" .smillimited and route chart to \"P [facility] \" (Giving one month refill in non controlled medications is strongly recommended before denial)    If refill has been denied, meaning absolutely no refills without visit, please complete the smart phrase \".smirxrefuse\" and route it to the \"P SMI MED REFILLS\"  pool to inform the patient and the pharmacy.    Kathie Smiley, CMA        "

## 2021-07-06 DIAGNOSIS — I10 ESSENTIAL HYPERTENSION: ICD-10-CM

## 2021-07-06 RX ORDER — AMLODIPINE BESYLATE 2.5 MG/1
2.5 TABLET ORAL DAILY
Qty: 90 TABLET | Refills: 1 | Status: SHIPPED | OUTPATIENT
Start: 2021-07-06 | End: 2021-11-19

## 2021-07-06 NOTE — TELEPHONE ENCOUNTER
"Request for medication refill:  amLODIPine (NORVASC) 2.5 MG tablet    Providers if patient needs an appointment and you are willing to give a one month supply please refill for one month and  send a letter/MyChart using \".SMILLIMITEDREFILL\" .smillimited and route chart to \"P [facility] \" (Giving one month refill in non controlled medications is strongly recommended before denial)    If refill has been denied, meaning absolutely no refills without visit, please complete the smart phrase \".smirxrefuse\" and route it to the \"P SMI MED REFILLS\"  pool to inform the patient and the pharmacy.    Zandra Gu MA        "

## 2021-07-15 ENCOUNTER — TELEPHONE (OUTPATIENT)
Dept: OTOLARYNGOLOGY | Facility: CLINIC | Age: 51
End: 2021-07-15

## 2021-07-15 ENCOUNTER — PREP FOR PROCEDURE (OUTPATIENT)
Dept: OTOLARYNGOLOGY | Facility: CLINIC | Age: 51
End: 2021-07-15

## 2021-07-15 DIAGNOSIS — J34.3 HYPERTROPHY OF BOTH INFERIOR NASAL TURBINATES: Primary | ICD-10-CM

## 2021-07-15 DIAGNOSIS — J34.89 NASAL OBSTRUCTION: ICD-10-CM

## 2021-07-15 NOTE — TELEPHONE ENCOUNTER
Patient called to schedule surgery with Dr. Garcia     Surgeon: Dr. Garcia   Date of Surgery: 08/16/2021  Location of surgery: [facility]  Pre-Op H&P: PCP   Post-Op Appt Date: 1 week    Imaging needed:  No  Discussed COVID-19 testing:  Yes  Pre-cert/Authorization completed:  No  Packet sent out: Yes 07/15/21    Patient aware of timing of his surgery day. Aware pre-op RN will call 2-3 days prior with arrival and instructions. Aware he will be contacted for pre-op covid-19 testing.       Asha Gerard on 7/15/2021 at 10:55 AM

## 2021-07-19 DIAGNOSIS — Z11.59 ENCOUNTER FOR SCREENING FOR OTHER VIRAL DISEASES: ICD-10-CM

## 2021-07-19 PROBLEM — J34.3 HYPERTROPHY OF BOTH INFERIOR NASAL TURBINATES: Status: ACTIVE | Noted: 2021-07-19

## 2021-07-19 PROBLEM — J34.89 NASAL OBSTRUCTION: Status: ACTIVE | Noted: 2021-07-19

## 2021-07-30 ENCOUNTER — OFFICE VISIT (OUTPATIENT)
Dept: FAMILY MEDICINE | Facility: CLINIC | Age: 51
End: 2021-07-30
Payer: COMMERCIAL

## 2021-07-30 VITALS
SYSTOLIC BLOOD PRESSURE: 124 MMHG | TEMPERATURE: 98.1 F | WEIGHT: 276 LBS | DIASTOLIC BLOOD PRESSURE: 74 MMHG | HEART RATE: 55 BPM | RESPIRATION RATE: 12 BRPM | BODY MASS INDEX: 34.5 KG/M2 | OXYGEN SATURATION: 98 %

## 2021-07-30 DIAGNOSIS — Z01.818 PREOP GENERAL PHYSICAL EXAM: Primary | ICD-10-CM

## 2021-07-30 PROCEDURE — 99213 OFFICE O/P EST LOW 20 MIN: CPT | Mod: GC | Performed by: STUDENT IN AN ORGANIZED HEALTH CARE EDUCATION/TRAINING PROGRAM

## 2021-07-30 NOTE — PATIENT INSTRUCTIONS
Patient Education   Here is the plan from today's visit    1. Preop general physical exam  It was so nice to meet you today! Overall your exam looked good. Continue with your regular medications before surgery, just make sure not to take your sildenafil within 48 hours of surgery. Call if you have any questions or concerns, and if your wife notices your snoring doesn't improve at all after your surgery let us know and we can consider getting you connected for a sleep study to check for sleep apnea. Take care!       Please call or return to clinic if your symptoms don't go away.    Follow up plan  No follow-ups on file.    Thank you for coming to [facility] today.  COVID-19 Vaccine:  Starting Monday 8/2/21: [facility] will have walk-in appointments for Moderna, Pfizer and [place] vaccines. No appointment needed! You will also have the option of receiving Moderna vaccine during your physician appointment. Please ask your care team for more information!  You may be eligible for a $100 Visa giftcard if you receive your first dose between Friday July 30th and Sunday August 15th. Visit https://[place].gov/covid19/100/ for more information.   Lab Testing:  **If you had lab testing today and your results are reassuring or normal they will be mailed to you or sent through [employer] within 7 days.   **If the lab tests need quick action we will call you with the results.  **If you are having labs done on a different day, please call [phone] to schedule at [facility] or [phone] for other [facility] locations.   The phone number we will call with results is # [phone] (home) [phone] (work). If this is not the best number please call our clinic and change the number.  Medication Refills:  If you need any refills please call your pharmacy and they will contact us.   If you need to  your refill at a new pharmacy, please contact the new pharmacy directly. The new  pharmacy will help you get your medications transferred faster.   Scheduling:  If you have any concerns about today's visit or wish to schedule another appointment please call our office during normal business hours [phone] (8-5:00 M-F)  If a referral was made to a [facility] and you don't get a call from central scheduling please call [phone].  If a Mammogram was ordered for you at [facility] call [phone] to schedule or change your appointment.  If you had an EKG/XRay/CT/Ultrasound/MRI ordered the number is [phone] to schedule or change your radiology appointment.   Medical Concerns:  If you have urgent medical concerns please call [phone] at any time of the day.    Diane Carballo MD       Preparing for Your Surgery  Getting started  A nurse will call you to review your health history and instructions. They will give you an arrival time based on your scheduled surgery time.  Please be ready to share the following:    Your doctor's clinic name and phone number    Your medical, surgical and anesthesia history    A list of allergies and sensitivities    A list of medicines, including herbal treatments and over-the-counter drugs    Whether the patient has a legal guardian (ask how to send us the papers in advance)  If you have a child who's having surgery, please ask for a copy of Preparing for Your Child's Surgery.    Preparing for surgery    Within 30 days of surgery: Have a pre-op exam (sometimes called an H&P, or History and Physical). This can be done at a clinic or pre-operative center.  ? If you're having a , you may not need this exam. Talk to your care team    At your pre-op exam, talk to your care team about all medicines you take. If you need to stop any medicines before surgery, ask when to start taking them again.  ? We do this for your safety. Many medicines can make you bleed too much during surgery. Some change how well surgery  (anesthesia) drugs work.    Call your insurance company to let them know you're having surgery. (If you don't have insurance, call [phone].)    Call your clinic if there's any change in your health. This includes signs of a cold or flu (sore throat, runny nose, cough, rash, fever). It also includes a scrape or scratch near the surgery site.    If you have questions on the day of surgery, call your hospital or surgery center.  Eating and drinking guidelines  For your safety: Unless your surgeon tells you otherwise, follow the guidelines below.    Eat and drink as usual until 8 hours before surgery. After that, no food or milk.    Drink clear liquids until 2 hours before surgery. These are liquids you can see through, like water, Gatorade and Propel Water. You may also have black coffee and tea (no cream or milk).    Nothing by mouth within 2 hours of surgery. This includes gum, candy and breath mints.    If you drink, stop drinking alcohol the night before surgery.    If your care team tells you to take medicine on the morning of surgery, it's okay to take it with a sip of water.  Preventing infection    Shower or bathe the night before and morning of your surgery. Follow the instructions your clinic gave you. (If no instructions, use regular soap.)    Don't shave or clip hair near your surgery site. We'll remove the hair if needed.    Don't smoke or vape the morning of surgery. You may chew nicotine gum up to 2 hours before surgery. A nicotine patch is okay.  ? Note: Some surgeries require you to completely quit smoking and nicotine. Check with your surgeon.    Your care team will make every effort to keep you safe from infection. We will:  ? Clean our hands often with soap and water (or an alcohol-based hand rub).  ? Clean the skin at your surgery site with a special soap that kills germs.  ? Give you a special gown to keep you warm. (Cold raises the risk of infection.)  ? Wear special hair covers, masks,  gowns and gloves during surgery.  ? Give antibiotic medicine, if prescribed. Not all surgeries need antibiotics.  What to bring on the day of surgery    Photo ID and insurance card    Copy of your health care directive, if you have one    Glasses and hearing aides (bring cases)  ? You can't wear contacts during surgery    Inhaler and eye drops, if you use them (tell us about these when you arrive)    CPAP machine or breathing device, if you use them    A few personal items, if spending the night    If you have . . .  ? A pacemaker or ICD (cardiac defibrillator): Bring the ID card.  ? An implanted stimulator: Bring the remote control.  ? A legal guardian: Bring a copy of the certified (court-stamped) guardianship papers.  Please remove any jewelry, including body piercings. Leave jewelry and other valuables at home.  If you're going home the day of surgery  Important: If you don't follow the rules below, we must cancel your surgery.     Arrange for someone to drive you home after surgery. You may not drive, take a taxi or take public transportation by yourself (unless you'll have local anesthesia only).    Arrange for a responsible adult to stay with you overnight. If you don't, we may keep you in the hospital overnight, and you may need to pay the costs yourself.  Questions?   If you have any questions for your care team, list them here: _________________________________________________________________________________________________________________________________________________________________________________________________________________________________________________________________________________________________________________________  For informational purposes only. Not to replace the advice of your health care provider. Copyright   2003, 2019 [facility]. All rights reserved. Clinically reviewed by Debbie Carson MD. SMARTworks 029838 - REV 4/20.    Preparing for Your Surgery  Getting  started  A nurse will call you to review your health history and instructions. They will give you an arrival time based on your scheduled surgery time.  Please be ready to share the following:    Your doctor's clinic name and phone number    Your medical, surgical and anesthesia history    A list of allergies and sensitivities    A list of medicines, including herbal treatments and over-the-counter drugs    Whether the patient has a legal guardian (ask how to send us the papers in advance)  If you have a child who's having surgery, please ask for a copy of Preparing for Your Child's Surgery.    Preparing for surgery    Within 30 days of surgery: Have a pre-op exam (sometimes called an H&P, or History and Physical). This can be done at a clinic or pre-operative center.  ? If you're having a , you may not need this exam. Talk to your care team    At your pre-op exam, talk to your care team about all medicines you take. If you need to stop any medicines before surgery, ask when to start taking them again.  ? We do this for your safety. Many medicines can make you bleed too much during surgery. Some change how well surgery (anesthesia) drugs work.    Call your insurance company to let them know you're having surgery. (If you don't have insurance, call [phone].)    Call your clinic if there's any change in your health. This includes signs of a cold or flu (sore throat, runny nose, cough, rash, fever). It also includes a scrape or scratch near the surgery site.    If you have questions on the day of surgery, call your hospital or surgery center.  Eating and drinking guidelines  For your safety: Unless your surgeon tells you otherwise, follow the guidelines below.    Eat and drink as usual until 8 hours before surgery. After that, no food or milk.    Drink clear liquids until 2 hours before surgery. These are liquids you can see through, like water, Gatorade and Propel Water. You may also have black coffee  and tea (no cream or milk).    Nothing by mouth within 2 hours of surgery. This includes gum, candy and breath mints.    If you drink, stop drinking alcohol the night before surgery.    If your care team tells you to take medicine on the morning of surgery, it's okay to take it with a sip of water.  Preventing infection    Shower or bathe the night before and morning of your surgery. Follow the instructions your clinic gave you. (If no instructions, use regular soap.)    Don't shave or clip hair near your surgery site. We'll remove the hair if needed.    Don't smoke or vape the morning of surgery. You may chew nicotine gum up to 2 hours before surgery. A nicotine patch is okay.  ? Note: Some surgeries require you to completely quit smoking and nicotine. Check with your surgeon.    Your care team will make every effort to keep you safe from infection. We will:  ? Clean our hands often with soap and water (or an alcohol-based hand rub).  ? Clean the skin at your surgery site with a special soap that kills germs.  ? Give you a special gown to keep you warm. (Cold raises the risk of infection.)  ? Wear special hair covers, masks, gowns and gloves during surgery.  ? Give antibiotic medicine, if prescribed. Not all surgeries need antibiotics.  What to bring on the day of surgery    Photo ID and insurance card    Copy of your health care directive, if you have one    Glasses and hearing aides (bring cases)  ? You can't wear contacts during surgery    Inhaler and eye drops, if you use them (tell us about these when you arrive)    CPAP machine or breathing device, if you use them    A few personal items, if spending the night    If you have . . .  ? A pacemaker or ICD (cardiac defibrillator): Bring the ID card.  ? An implanted stimulator: Bring the remote control.  ? A legal guardian: Bring a copy of the certified (court-stamped) guardianship papers.  Please remove any jewelry, including body piercings. Leave jewelry and  other valuables at home.  If you're going home the day of surgery  Important: If you don't follow the rules below, we must cancel your surgery.     Arrange for someone to drive you home after surgery. You may not drive, take a taxi or take public transportation by yourself (unless you'll have local anesthesia only).    Arrange for a responsible adult to stay with you overnight. If you don't, we may keep you in the hospital overnight, and you may need to pay the costs yourself.  Questions?   If you have any questions for your care team, list them here: _________________________________________________________________________________________________________________________________________________________________________________________________________________________________________________________________________________________________________________________  For informational purposes only. Not to replace the advice of your health care provider. Copyright   2003, 2019 [facility]. All rights reserved. Clinically reviewed by Debbie Carson MD. SMARTworks 537650 - REV 4/20.

## 2021-07-30 NOTE — H&P (VIEW-ONLY)
[address] [address]  Phone: [phone]  Fax: [phone]  Primary Provider: Richy Dorsey  Pre-op Performing Provider: VELIA SMITH      PREOPERATIVE EVALUATION:  Today's date: 7/30/2021    Lui Arrington is a 51 year old male who presents for a preoperative evaluation.    Surgical Information:  Surgery/Procedure: Nasal Turbinate reduction  Surgery Location: [facility] [place]   Surgeon: Dr. Phoenix Garcia   Surgery Date: 8/16/2021  Time of Surgery: 12:00  Where patient plans to recover: At home with family  Fax number for surgical facility: Note does not need to be faxed, will be available electronically in Epic.    Type of Anesthesia Anticipated: General    Assessment & Plan     The proposed surgical procedure is considered LOW risk.    Preop general physical exam  Overall all low risk. Medically safe to proceed with planned procedure at this time. Discussed current medications and recommend no sildenafil use for at minimum 48 hours prior to procedure, otherwise take all regularly scheduled medications including the day of surgery.       Possible Sleep Apnea: Feels well rested in the morning but wife complains of significant snoring, though no obvious periods of apnea. Follow up if snoring not improved after surgery.        Risks and Recommendations:  The patient may have the following additional risks and recommendations for perioperative complications:  Obstructive Sleep Apnea:   Not yet diagnosed. Patient's wife reports significant snoring history however patient denies daytime fatigue. No previous sleep study. Planned procedure is nasal turbinate reduction so likely to improve snoring. Will plan for further evaluation if not improved post procedure.    Medication Instructions:  Patient is to take all scheduled medications on the day of surgery EXCEPT for modifications listed below:   Sidenafil- not to be taken within 48 hours of  procedure    RECOMMENDATION:  APPROVAL GIVEN to proceed with proposed procedure, without further diagnostic evaluation.        Subjective     HPI related to upcoming procedure: Has enlarged nasal turbinates, snores loudly per wife, and significant allergies (year around, indoor and outdoor) and has be unable to tolerate antihistamines.     Preop Questions 7/27/2021   1. Have you ever had a heart attack or stroke? No   2. Have you ever had surgery on your heart or blood vessels, such as a stent placement, a coronary artery bypass, or surgery on an artery in your head, neck, heart, or legs? No   3. Do you have chest pain with activity? No   4. Do you have a history of  heart failure? No   5. Do you currently have a cold, bronchitis or symptoms of other infection? No   6. Do you have a cough, shortness of breath, or wheezing? No   7. Do you or anyone in your family have previous history of blood clots? No   8. Do you or does anyone in your family have a serious bleeding problem such as prolonged bleeding following surgeries or cuts? No   9. Have you ever had problems with anemia or been told to take iron pills? No   10. Have you had any abnormal blood loss such as black, tarry or bloody stools? No   11. Have you ever had a blood transfusion? No   12. Are you willing to have a blood transfusion if it is medically needed before, during, or after your surgery? Yes   13. Have you or any of your relatives ever had problems with anesthesia? No   14. Do you have sleep apnea, excessive snoring or daytime drowsiness? YES - snoring per his wife. Hoping this surgery will help! Never had a sleep study   14a. Do you have a CPAP machine? No   15. Do you have any artifical heart valves or other implanted medical devices like a pacemaker, defibrillator, or continuous glucose monitor? No   16. Do you have artificial joints? No   17. Are you allergic to latex? No       Health Care Directive:  Patient does not have a Health Care  Directive or Living Will: Discussed advance care planning with patient; information given to patient to review.    Preoperative Review of :   reviewed - no record of controlled substances prescribed.      Status of Chronic Conditions:  See problem list for active medical problems.  Problems all longstanding and stable, except as noted/documented.  See ROS for pertinent symptoms related to these conditions.      Review of Systems  Constitutional, neuro, ENT, endocrine, pulmonary, cardiac, gastrointestinal, genitourinary, musculoskeletal, integument and psychiatric systems are negative, except as otherwise noted.    Patient Active Problem List    Diagnosis Date Noted     Hypertrophy of both inferior nasal turbinates 07/19/2021     Priority: Medium     Added automatically from request for surgery [id number]       Nasal obstruction 07/19/2021     Priority: Medium     Added automatically from request for surgery [id number]       Numbness of fingers of both hands 05/24/2019     Priority: Medium     Benign essential hypertension 09/07/2018     Priority: Medium     Coronary artery disease involving native coronary artery of native heart without angina pectoris 09/07/2018     Priority: Medium     Cerebral aneurysm, nonruptured 09/07/2018     Priority: Medium      Past Medical History:   Diagnosis Date     Brain aneurysm     Small     Coronary artery disease      Diverticulosis      Seasonal allergies      Substance dependence, in remission (H) 1993     Past Surgical History:   Procedure Laterality Date     COLONOSCOPY  8/4/2014    Procedure: COMBINED COLONOSCOPY, SINGLE BIOPSY/POLYPECTOMY BY BIOPSY;  Surgeon: Pb Celaya MD;  Location:  GI     VASECTOMY       Current Outpatient Medications   Medication Sig Dispense Refill     amLODIPine (NORVASC) 2.5 MG tablet Take 1 tablet (2.5 mg) by mouth daily 90 tablet 1     aspirin 81 MG EC tablet Take 1 tablet (81 mg) by mouth daily 90 tablet 3     atorvastatin (LIPITOR)  40 MG tablet Take 1 tablet (40 mg) by mouth every evening Time for a follow up visit with labs! 90 tablet 0     azelastine (ASTELIN) 0.1 % nasal spray Spray 1 spray into both nostrils 2 times daily 30 mL 11     fluticasone (FLONASE) 50 MCG/ACT nasal spray Spray 1 spray into both nostrils daily       lisinopril (ZESTRIL) 10 MG tablet Take 1 tablet (10 mg) by mouth daily 90 tablet 3     sildenafil (REVATIO) 20 MG tablet Take 1 tablet (20 mg) by mouth as needed (ED) 30 tablet 11     Vitamin D, Cholecalciferol, 25 MCG (1000 UT) CAPS          Allergies   Allergen Reactions     Ciprofloxacin Hives     Erythromycin      Seasonal Allergies         Social History     Tobacco Use     Smoking status: Former Smoker     Quit date: 1998     Years since quittin.6     Smokeless tobacco: Never Used   Substance Use Topics     Alcohol use: No     Family History   Problem Relation Age of Onset     Cancer Mother      Cancer - colorectal Mother      Hypertension Mother      Crohn's Disease Mother      Cancer Father      Diabetes Maternal Grandmother      C.A.D. Maternal Grandmother      C.A.D. Paternal Grandfather      Hypertension Brother      History   Drug Use No     Comment: in remission , marijuana, ETOH, narcotic (no IV use)         Objective     /74 (BP Location: Left arm, Patient Position: Sitting, Cuff Size: Adult Large)   Pulse 55   Temp 98.1  F (36.7  C) (Oral)   Resp 12   Wt 125.2 kg (276 lb)   SpO2 98%   BMI 34.50 kg/m      Physical Exam    GENERAL APPEARANCE: healthy, alert and no distress     EYES: EOMI,  PERRL     HENT: ear canals and TM's normal, nose and mouth without ulcers or lesions, oral mucous membranes moist, oropharynx clear and tonsillar hypertrophy     NECK: no adenopathy, no asymmetry, masses, or scars and thyroid normal to palpation     RESP: lungs clear to auscultation - no rales, rhonchi or wheezes     CV: regular rates and rhythm, normal S1 S2, no S3 or S4 and no murmur, click or  rub     ABDOMEN:  soft, nontender, no HSM or masses and bowel sounds normal     MS: extremities normal- no gross deformities noted, no evidence of inflammation in joints, FROM in all extremities.     SKIN: no suspicious lesions or rashes     NEURO: Normal strength and tone, sensory exam grossly normal, mentation intact and speech normal     PSYCH: mentation appears normal. and affect normal/bright     LYMPHATICS: No cervical adenopathy    Recent Labs   Lab Test 12/11/19  1402   .4   POTASSIUM 4.2   CR 0.9        Diagnostics:  No results found for this or any previous visit (from the past 24 hour(s)).   No EKG required for low risk surgery (cataract, skin procedure, breast biopsy, etc).    Revised Cardiac Risk Index (RCRI):  The patient has the following serious cardiovascular risks for perioperative complications:   - No serious cardiac risks = 0 points     RCRI Interpretation: 0 points: Class I (very low risk - 0.4% complication rate)           Signed Electronically by: Diane Carballo MD  Copy of this evaluation report is provided to requesting physician.

## 2021-07-30 NOTE — PROGRESS NOTES
Preceptor Attestation:   Patient seen and discussed with the resident. Assessment and plan reviewed with resident and agreed upon.   Supervising Physician:  DO [facility]

## 2021-07-30 NOTE — PROGRESS NOTES
[address] [address]  Phone: [phone]  Fax: [phone]  Primary Provider: Richy Dorsey  Pre-op Performing Provider: VELIA SMITH      PREOPERATIVE EVALUATION:  Today's date: 7/30/2021    Lui Arrington is a 51 year old male who presents for a preoperative evaluation.    Surgical Information:  Surgery/Procedure: Nasal Turbinate reduction  Surgery Location: [facility] – [place]   Surgeon: Dr. Phoenix Garcia   Surgery Date: 8/16/2021  Time of Surgery: 12:00  Where patient plans to recover: At home with family  Fax number for surgical facility: Note does not need to be faxed, will be available electronically in Epic.    Type of Anesthesia Anticipated: General    Assessment & Plan     The proposed surgical procedure is considered LOW risk.    Preop general physical exam  Overall all low risk. Medically safe to proceed with planned procedure at this time. Discussed current medications and recommend no sildenafil use for at minimum 48 hours prior to procedure, otherwise take all regularly scheduled medications including the day of surgery.       Possible Sleep Apnea: Feels well rested in the morning but wife complains of significant snoring, though no obvious periods of apnea. Follow up if snoring not improved after surgery.        Risks and Recommendations:  The patient may have the following additional risks and recommendations for perioperative complications:  Obstructive Sleep Apnea:   Not yet diagnosed. Patient's wife reports significant snoring history however patient denies daytime fatigue. No previous sleep study. Planned procedure is nasal turbinate reduction so likely to improve snoring. Will plan for further evaluation if not improved post procedure.    Medication Instructions:  Patient is to take all scheduled medications on the day of surgery EXCEPT for modifications listed below:   Sidenafil- not to be taken within 48 hours of  procedure    RECOMMENDATION:  APPROVAL GIVEN to proceed with proposed procedure, without further diagnostic evaluation.        Subjective     HPI related to upcoming procedure: Has enlarged nasal turbinates, snores loudly per wife, and significant allergies (year around, indoor and outdoor) and has be unable to tolerate antihistamines.     Preop Questions 7/27/2021   1. Have you ever had a heart attack or stroke? No   2. Have you ever had surgery on your heart or blood vessels, such as a stent placement, a coronary artery bypass, or surgery on an artery in your head, neck, heart, or legs? No   3. Do you have chest pain with activity? No   4. Do you have a history of  heart failure? No   5. Do you currently have a cold, bronchitis or symptoms of other infection? No   6. Do you have a cough, shortness of breath, or wheezing? No   7. Do you or anyone in your family have previous history of blood clots? No   8. Do you or does anyone in your family have a serious bleeding problem such as prolonged bleeding following surgeries or cuts? No   9. Have you ever had problems with anemia or been told to take iron pills? No   10. Have you had any abnormal blood loss such as black, tarry or bloody stools? No   11. Have you ever had a blood transfusion? No   12. Are you willing to have a blood transfusion if it is medically needed before, during, or after your surgery? Yes   13. Have you or any of your relatives ever had problems with anesthesia? No   14. Do you have sleep apnea, excessive snoring or daytime drowsiness? YES - snoring per his wife. Hoping this surgery will help! Never had a sleep study   14a. Do you have a CPAP machine? No   15. Do you have any artifical heart valves or other implanted medical devices like a pacemaker, defibrillator, or continuous glucose monitor? No   16. Do you have artificial joints? No   17. Are you allergic to latex? No       Health Care Directive:  Patient does not have a Health Care  Directive or Living Will: Discussed advance care planning with patient; information given to patient to review.    Preoperative Review of :   reviewed - no record of controlled substances prescribed.      Status of Chronic Conditions:  See problem list for active medical problems.  Problems all longstanding and stable, except as noted/documented.  See ROS for pertinent symptoms related to these conditions.      Review of Systems  Constitutional, neuro, ENT, endocrine, pulmonary, cardiac, gastrointestinal, genitourinary, musculoskeletal, integument and psychiatric systems are negative, except as otherwise noted.    Patient Active Problem List    Diagnosis Date Noted     Hypertrophy of both inferior nasal turbinates 07/19/2021     Priority: Medium     Added automatically from request for surgery [id number]       Nasal obstruction 07/19/2021     Priority: Medium     Added automatically from request for surgery [id number]       Numbness of fingers of both hands 05/24/2019     Priority: Medium     Benign essential hypertension 09/07/2018     Priority: Medium     Coronary artery disease involving native coronary artery of native heart without angina pectoris 09/07/2018     Priority: Medium     Cerebral aneurysm, nonruptured 09/07/2018     Priority: Medium      Past Medical History:   Diagnosis Date     Brain aneurysm     Small     Coronary artery disease      Diverticulosis      Seasonal allergies      Substance dependence, in remission (H) 1993     Past Surgical History:   Procedure Laterality Date     COLONOSCOPY  8/4/2014    Procedure: COMBINED COLONOSCOPY, SINGLE BIOPSY/POLYPECTOMY BY BIOPSY;  Surgeon: Pb Celaya MD;  Location:  GI     VASECTOMY       Current Outpatient Medications   Medication Sig Dispense Refill     amLODIPine (NORVASC) 2.5 MG tablet Take 1 tablet (2.5 mg) by mouth daily 90 tablet 1     aspirin 81 MG EC tablet Take 1 tablet (81 mg) by mouth daily 90 tablet 3     atorvastatin (LIPITOR)  40 MG tablet Take 1 tablet (40 mg) by mouth every evening Time for a follow up visit with labs! 90 tablet 0     azelastine (ASTELIN) 0.1 % nasal spray Spray 1 spray into both nostrils 2 times daily 30 mL 11     fluticasone (FLONASE) 50 MCG/ACT nasal spray Spray 1 spray into both nostrils daily       lisinopril (ZESTRIL) 10 MG tablet Take 1 tablet (10 mg) by mouth daily 90 tablet 3     sildenafil (REVATIO) 20 MG tablet Take 1 tablet (20 mg) by mouth as needed (ED) 30 tablet 11     Vitamin D, Cholecalciferol, 25 MCG (1000 UT) CAPS          Allergies   Allergen Reactions     Ciprofloxacin Hives     Erythromycin      Seasonal Allergies         Social History     Tobacco Use     Smoking status: Former Smoker     Quit date: 1998     Years since quittin.6     Smokeless tobacco: Never Used   Substance Use Topics     Alcohol use: No     Family History   Problem Relation Age of Onset     Cancer Mother      Cancer - colorectal Mother      Hypertension Mother      Crohn's Disease Mother      Cancer Father      Diabetes Maternal Grandmother      C.A.D. Maternal Grandmother      C.A.D. Paternal Grandfather      Hypertension Brother      History   Drug Use No     Comment: in remission , marijuana, ETOH, narcotic (no IV use)         Objective     /74 (BP Location: Left arm, Patient Position: Sitting, Cuff Size: Adult Large)   Pulse 55   Temp 98.1  F (36.7  C) (Oral)   Resp 12   Wt 125.2 kg (276 lb)   SpO2 98%   BMI 34.50 kg/m      Physical Exam    GENERAL APPEARANCE: healthy, alert and no distress     EYES: EOMI,  PERRL     HENT: ear canals and TM's normal, nose and mouth without ulcers or lesions, oral mucous membranes moist, oropharynx clear and tonsillar hypertrophy     NECK: no adenopathy, no asymmetry, masses, or scars and thyroid normal to palpation     RESP: lungs clear to auscultation - no rales, rhonchi or wheezes     CV: regular rates and rhythm, normal S1 S2, no S3 or S4 and no murmur, click or  rub     ABDOMEN:  soft, nontender, no HSM or masses and bowel sounds normal     MS: extremities normal- no gross deformities noted, no evidence of inflammation in joints, FROM in all extremities.     SKIN: no suspicious lesions or rashes     NEURO: Normal strength and tone, sensory exam grossly normal, mentation intact and speech normal     PSYCH: mentation appears normal. and affect normal/bright     LYMPHATICS: No cervical adenopathy    Recent Labs   Lab Test 12/11/19  1402   .4   POTASSIUM 4.2   CR 0.9        Diagnostics:  No results found for this or any previous visit (from the past 24 hour(s)).   No EKG required for low risk surgery (cataract, skin procedure, breast biopsy, etc).    Revised Cardiac Risk Index (RCRI):  The patient has the following serious cardiovascular risks for perioperative complications:   - No serious cardiac risks = 0 points     RCRI Interpretation: 0 points: Class I (very low risk - 0.4% complication rate)           Signed Electronically by: Diane Carballo MD  Copy of this evaluation report is provided to requesting physician.

## 2021-08-12 ENCOUNTER — LAB (OUTPATIENT)
Dept: LAB | Facility: CLINIC | Age: 51
End: 2021-08-12
Payer: COMMERCIAL

## 2021-08-12 DIAGNOSIS — Z11.59 ENCOUNTER FOR SCREENING FOR OTHER VIRAL DISEASES: ICD-10-CM

## 2021-08-12 PROCEDURE — U0005 INFEC AGEN DETEC AMPLI PROBE: HCPCS

## 2021-08-12 PROCEDURE — U0003 INFECTIOUS AGENT DETECTION BY NUCLEIC ACID (DNA OR RNA); SEVERE ACUTE RESPIRATORY SYNDROME CORONAVIRUS 2 (SARS-COV-2) (CORONAVIRUS DISEASE [COVID-19]), AMPLIFIED PROBE TECHNIQUE, MAKING USE OF HIGH THROUGHPUT TECHNOLOGIES AS DESCRIBED BY CMS-2020-01-R: HCPCS

## 2021-08-13 ENCOUNTER — ANESTHESIA EVENT (OUTPATIENT)
Dept: SURGERY | Facility: AMBULATORY SURGERY CENTER | Age: 51
End: 2021-08-13
Payer: COMMERCIAL

## 2021-08-13 LAB — SARS-COV-2 RNA RESP QL NAA+PROBE: NEGATIVE

## 2021-08-16 ENCOUNTER — ANESTHESIA (OUTPATIENT)
Dept: SURGERY | Facility: AMBULATORY SURGERY CENTER | Age: 51
End: 2021-08-16
Payer: COMMERCIAL

## 2021-08-16 ENCOUNTER — HOSPITAL ENCOUNTER (OUTPATIENT)
Facility: AMBULATORY SURGERY CENTER | Age: 51
End: 2021-08-16
Attending: OTOLARYNGOLOGY
Payer: COMMERCIAL

## 2021-08-16 VITALS
HEART RATE: 56 BPM | TEMPERATURE: 96.3 F | OXYGEN SATURATION: 99 % | SYSTOLIC BLOOD PRESSURE: 165 MMHG | WEIGHT: 273 LBS | DIASTOLIC BLOOD PRESSURE: 94 MMHG | BODY MASS INDEX: 33.94 KG/M2 | HEIGHT: 75 IN | RESPIRATION RATE: 16 BRPM

## 2021-08-16 DIAGNOSIS — J34.89 NASAL OBSTRUCTION: ICD-10-CM

## 2021-08-16 DIAGNOSIS — J34.3 HYPERTROPHY OF BOTH INFERIOR NASAL TURBINATES: ICD-10-CM

## 2021-08-16 PROCEDURE — 30140 RESECT INFERIOR TURBINATE: CPT | Mod: 50 | Performed by: OTOLARYNGOLOGY

## 2021-08-16 PROCEDURE — 30140 RESECT INFERIOR TURBINATE: CPT | Mod: 50

## 2021-08-16 RX ORDER — FENTANYL CITRATE 50 UG/ML
25 INJECTION, SOLUTION INTRAMUSCULAR; INTRAVENOUS EVERY 5 MIN PRN
Status: DISCONTINUED | OUTPATIENT
Start: 2021-08-16 | End: 2021-08-16 | Stop reason: HOSPADM

## 2021-08-16 RX ORDER — PROPOFOL 10 MG/ML
INJECTION, EMULSION INTRAVENOUS CONTINUOUS PRN
Status: DISCONTINUED | OUTPATIENT
Start: 2021-08-16 | End: 2021-08-16

## 2021-08-16 RX ORDER — ONDANSETRON 4 MG/1
4 TABLET, ORALLY DISINTEGRATING ORAL EVERY 30 MIN PRN
Status: DISCONTINUED | OUTPATIENT
Start: 2021-08-16 | End: 2021-08-17 | Stop reason: HOSPADM

## 2021-08-16 RX ORDER — ECHINACEA PURPUREA EXTRACT 125 MG
2 TABLET ORAL EVERY 4 HOURS
Qty: 88 ML | Refills: 0 | Status: SHIPPED | OUTPATIENT
Start: 2021-08-16 | End: 2023-03-01

## 2021-08-16 RX ORDER — ACETAMINOPHEN 325 MG/1
650 TABLET ORAL
Status: DISCONTINUED | OUTPATIENT
Start: 2021-08-16 | End: 2021-08-17 | Stop reason: HOSPADM

## 2021-08-16 RX ORDER — OXYMETAZOLINE HYDROCHLORIDE 0.05 G/100ML
2 SPRAY NASAL DAILY PRN
Qty: 22 ML | Refills: 0 | Status: SHIPPED | OUTPATIENT
Start: 2021-08-16 | End: 2023-03-01

## 2021-08-16 RX ORDER — ONDANSETRON 2 MG/ML
4 INJECTION INTRAMUSCULAR; INTRAVENOUS EVERY 30 MIN PRN
Status: DISCONTINUED | OUTPATIENT
Start: 2021-08-16 | End: 2021-08-17 | Stop reason: HOSPADM

## 2021-08-16 RX ORDER — HYDROCODONE BITARTRATE AND ACETAMINOPHEN 5; 325 MG/1; MG/1
1 TABLET ORAL
Status: DISCONTINUED | OUTPATIENT
Start: 2021-08-16 | End: 2021-08-17 | Stop reason: HOSPADM

## 2021-08-16 RX ORDER — PROPOFOL 10 MG/ML
INJECTION, EMULSION INTRAVENOUS PRN
Status: DISCONTINUED | OUTPATIENT
Start: 2021-08-16 | End: 2021-08-16

## 2021-08-16 RX ORDER — ACETAMINOPHEN 325 MG/1
975 TABLET ORAL ONCE
Status: COMPLETED | OUTPATIENT
Start: 2021-08-16 | End: 2021-08-16

## 2021-08-16 RX ORDER — DEXAMETHASONE SODIUM PHOSPHATE 10 MG/ML
INJECTION, SOLUTION INTRAMUSCULAR; INTRAVENOUS PRN
Status: DISCONTINUED | OUTPATIENT
Start: 2021-08-16 | End: 2021-08-16

## 2021-08-16 RX ORDER — OXYCODONE HYDROCHLORIDE 5 MG/1
5 TABLET ORAL EVERY 6 HOURS PRN
Qty: 10 TABLET | Refills: 0 | Status: SHIPPED | OUTPATIENT
Start: 2021-08-16 | End: 2021-08-19

## 2021-08-16 RX ORDER — OXYCODONE HYDROCHLORIDE 5 MG/1
5 TABLET ORAL EVERY 4 HOURS PRN
Status: DISCONTINUED | OUTPATIENT
Start: 2021-08-16 | End: 2021-08-17 | Stop reason: HOSPADM

## 2021-08-16 RX ORDER — ONDANSETRON 2 MG/ML
INJECTION INTRAMUSCULAR; INTRAVENOUS PRN
Status: DISCONTINUED | OUTPATIENT
Start: 2021-08-16 | End: 2021-08-16

## 2021-08-16 RX ORDER — LIDOCAINE 40 MG/G
CREAM TOPICAL
Status: DISCONTINUED | OUTPATIENT
Start: 2021-08-16 | End: 2021-08-16 | Stop reason: HOSPADM

## 2021-08-16 RX ORDER — FENTANYL CITRATE 50 UG/ML
INJECTION, SOLUTION INTRAMUSCULAR; INTRAVENOUS PRN
Status: DISCONTINUED | OUTPATIENT
Start: 2021-08-16 | End: 2021-08-16

## 2021-08-16 RX ORDER — LIDOCAINE HYDROCHLORIDE 20 MG/ML
INJECTION, SOLUTION INFILTRATION; PERINEURAL PRN
Status: DISCONTINUED | OUTPATIENT
Start: 2021-08-16 | End: 2021-08-16

## 2021-08-16 RX ORDER — SODIUM CHLORIDE, SODIUM LACTATE, POTASSIUM CHLORIDE, CALCIUM CHLORIDE 600; 310; 30; 20 MG/100ML; MG/100ML; MG/100ML; MG/100ML
INJECTION, SOLUTION INTRAVENOUS CONTINUOUS
Status: DISCONTINUED | OUTPATIENT
Start: 2021-08-16 | End: 2021-08-16 | Stop reason: HOSPADM

## 2021-08-16 RX ORDER — LIDOCAINE HYDROCHLORIDE AND EPINEPHRINE 10; 10 MG/ML; UG/ML
INJECTION, SOLUTION INFILTRATION; PERINEURAL PRN
Status: DISCONTINUED | OUTPATIENT
Start: 2021-08-16 | End: 2021-08-16 | Stop reason: HOSPADM

## 2021-08-16 RX ORDER — SODIUM CHLORIDE, SODIUM LACTATE, POTASSIUM CHLORIDE, CALCIUM CHLORIDE 600; 310; 30; 20 MG/100ML; MG/100ML; MG/100ML; MG/100ML
INJECTION, SOLUTION INTRAVENOUS CONTINUOUS
Status: DISCONTINUED | OUTPATIENT
Start: 2021-08-16 | End: 2021-08-17 | Stop reason: HOSPADM

## 2021-08-16 RX ORDER — HYDROMORPHONE HYDROCHLORIDE 1 MG/ML
0.2 INJECTION, SOLUTION INTRAMUSCULAR; INTRAVENOUS; SUBCUTANEOUS EVERY 5 MIN PRN
Status: DISCONTINUED | OUTPATIENT
Start: 2021-08-16 | End: 2021-08-16 | Stop reason: HOSPADM

## 2021-08-16 RX ORDER — OXYMETAZOLINE HYDROCHLORIDE 0.05 G/100ML
SPRAY NASAL PRN
Status: DISCONTINUED | OUTPATIENT
Start: 2021-08-16 | End: 2021-08-16 | Stop reason: HOSPADM

## 2021-08-16 RX ADMIN — PROPOFOL 200 MG: 10 INJECTION, EMULSION INTRAVENOUS at 12:02

## 2021-08-16 RX ADMIN — OXYCODONE HYDROCHLORIDE 5 MG: 5 TABLET ORAL at 13:29

## 2021-08-16 RX ADMIN — FENTANYL CITRATE 25 MCG: 50 INJECTION, SOLUTION INTRAMUSCULAR; INTRAVENOUS at 13:13

## 2021-08-16 RX ADMIN — ONDANSETRON 4 MG: 2 INJECTION INTRAMUSCULAR; INTRAVENOUS at 12:09

## 2021-08-16 RX ADMIN — PROPOFOL 150 MCG/KG/MIN: 10 INJECTION, EMULSION INTRAVENOUS at 12:02

## 2021-08-16 RX ADMIN — FENTANYL CITRATE 25 MCG: 50 INJECTION, SOLUTION INTRAMUSCULAR; INTRAVENOUS at 13:07

## 2021-08-16 RX ADMIN — DEXAMETHASONE SODIUM PHOSPHATE 10 MG: 10 INJECTION, SOLUTION INTRAMUSCULAR; INTRAVENOUS at 12:09

## 2021-08-16 RX ADMIN — ONDANSETRON 4 MG: 2 INJECTION INTRAMUSCULAR; INTRAVENOUS at 13:12

## 2021-08-16 RX ADMIN — LIDOCAINE HYDROCHLORIDE 100 MG: 20 INJECTION, SOLUTION INFILTRATION; PERINEURAL at 12:01

## 2021-08-16 RX ADMIN — SODIUM CHLORIDE, SODIUM LACTATE, POTASSIUM CHLORIDE, CALCIUM CHLORIDE: 600; 310; 30; 20 INJECTION, SOLUTION INTRAVENOUS at 10:16

## 2021-08-16 RX ADMIN — Medication 50 MG: at 12:03

## 2021-08-16 RX ADMIN — ACETAMINOPHEN 975 MG: 325 TABLET ORAL at 10:16

## 2021-08-16 RX ADMIN — PROPOFOL 50 MG: 10 INJECTION, EMULSION INTRAVENOUS at 12:33

## 2021-08-16 RX ADMIN — FENTANYL CITRATE 100 MCG: 50 INJECTION, SOLUTION INTRAMUSCULAR; INTRAVENOUS at 12:01

## 2021-08-16 RX ADMIN — HYDROMORPHONE HYDROCHLORIDE 0.2 MG: 1 INJECTION, SOLUTION INTRAMUSCULAR; INTRAVENOUS; SUBCUTANEOUS at 13:21

## 2021-08-16 ASSESSMENT — MIFFLIN-ST. JEOR: SCORE: 2168.95

## 2021-08-16 NOTE — OP NOTE
Procedure Date: 2021    OPERATING SURGEON: Phoenix Garcia MD     ANESTHESIA:  General endotracheal    RESIDENT SURGEON:  Kaela Deleon MD     ESTIMATED BLOOD LOSS:  15 mL    COMPLICATIONS:  No immediate.    PREOPERATIVE DIAGNOSES:  Nasal obstruction with inferior turbinate hypertrophy.    POSTOPERATIVE DIAGNOSES:  Nasal obstruction with inferior turbinate hypertrophy.    PROCEDURE PERFORMED:  Bilateral submucosal resection of inferior turbinate.    INDICATIONS FOR PROCEDURE:  A 51-year-old gentleman with nasal obstruction seen in the Otolaryngology Clinic, noted to have inferior turbinate hypertrophy that was inadequately treated with medical management.  Therefore, the above-mentioned procedure was planned.    DESCRIPTION OF PROCEDURE:  After informed consent was obtained, the patient was brought back into the operating room and placed in supine position on the operating table.  After adequate anesthesia, he was intubated and prepped and draped in sterile standard fashion.  Timeout was taken.  After that, the inferior turbinates are injected with 1% lidocaine with 1:100,000 epinephrine.  A stab incision was made on the right inferior turbinate head.  A submucosal pocket was developed. A [employer] inferior turbinate shaver blade was used to do a submucosal resection including soft tissue and bone.  The inferior turbinate was then outfractured with a Boies elevator.  The identical procedure was then performed on the contralateral inferior turbinate.  The patient was then awakened, extubated in the operating room and taken to PACU in stable condition.    Phoenix Garcia MD        D: 2021   T: 2021   MT: [id number]    Name:     NERISSA MILLS  MRN:      [id number]        Account:        [id number]   :      1970           Procedure Date: 2021     Document: [id number]

## 2021-08-16 NOTE — ANESTHESIA CARE TRANSFER NOTE
Patient: Lui Arrington    Procedure(s):  REDUCTION, BILATERAL INFERIOR NASAL TURBINATE    Diagnosis: Hypertrophy of both inferior nasal turbinates [J34.3]  Nasal obstruction [J34.89]  Diagnosis Additional Information: No value filed.    Anesthesia Type:   General     Note:    Oropharynx: oropharynx clear of all foreign objects  Level of Consciousness: awake  Oxygen Supplementation: face mask  Level of Supplemental Oxygen (L/min / FiO2): 6  Independent Airway: airway patency satisfactory and stable  Dentition: dentition unchanged  Vital Signs Stable: post-procedure vital signs reviewed and stable  Report to RN Given: handoff report given  Patient transferred to: PACU  Comments: VSS and WNL, comfortable, no PONV, report to Lillian RN  Handoff Report: Identifed the Patient, Identified the Reponsible Provider, Reviewed the pertinent medical history, Discussed the surgical course, Reviewed Intra-OP anesthesia mangement and issues during anesthesia, Set expectations for post-procedure period and Allowed opportunity for questions and acknowledgement of understanding      Vitals:  Vitals Value Taken Time   BP     Temp     Pulse 67 08/16/21 1300   Resp 9 08/16/21 1300   SpO2 99 % 08/16/21 1300   Vitals shown include unvalidated device data.    Electronically Signed By: KALLIE Rendon CRNA  August 16, 2021  1:01 PM

## 2021-08-16 NOTE — ANESTHESIA PREPROCEDURE EVALUATION
Anesthesia Pre-Procedure Evaluation    Patient: Lui Arrington   MRN: [id number] : 1970        Preoperative Diagnosis: Hypertrophy of both inferior nasal turbinates [J34.3]  Nasal obstruction [J34.89]   Procedure : Procedure(s):  REDUCTION, BILATERAL INFERIOR NASAL TURBINATE     Past Medical History:   Diagnosis Date     Brain aneurysm     Small     Coronary artery disease      Diverticulosis      Seasonal allergies      Substance dependence, in remission (H)       Past Surgical History:   Procedure Laterality Date     COLONOSCOPY  2014    Procedure: COMBINED COLONOSCOPY, SINGLE BIOPSY/POLYPECTOMY BY BIOPSY;  Surgeon: Pb Celaya MD;  Location: [facility]     VASECTOMY        Allergies   Allergen Reactions     Ciprofloxacin Hives     Erythromycin      Seasonal Allergies       Social History     Tobacco Use     Smoking status: Former Smoker     Quit date: 1998     Years since quittin.6     Smokeless tobacco: Never Used   Substance Use Topics     Alcohol use: No      Wt Readings from Last 1 Encounters:   21 123.8 kg (273 lb)        Anesthesia Evaluation            ROS/MED HX  ENT/Pulmonary:     (+) NYLA risk factors,     Neurologic: Comment: 1.6mm R MCA bifurcation aneurysm      Cardiovascular:     (+) hypertension--CAD ---    METS/Exercise Tolerance:     Hematologic:       Musculoskeletal:       GI/Hepatic:       Renal/Genitourinary:       Endo:       Psychiatric/Substance Use:       Infectious Disease:       Malignancy:       Other:            Physical Exam    Airway        Mallampati: II   TM distance: > 3 FB      Respiratory Devices and Support         Dental           Cardiovascular          Rhythm and rate: regular and normal     Pulmonary           breath sounds clear to auscultation           OUTSIDE LABS:  CBC:   Lab Results   Component Value Date    WBC 6.6 2018    WBC 8.3 2014    HGB 15.2 2018    HGB 14.3 2017    HCT 45.6 2018    HCT 44.9  09/29/2017     (L) 09/03/2018     06/02/2014     BMP:   Lab Results   Component Value Date    .4 12/11/2019     09/03/2018    POTASSIUM 4.2 12/11/2019    POTASSIUM 4.0 09/03/2018    CHLORIDE 99.4 12/11/2019    CHLORIDE 106 09/03/2018    CO2 27.3 12/11/2019    CO2 27 09/03/2018    BUN 14.4 12/11/2019    BUN 15 09/03/2018    CR 0.9 12/11/2019    CR 0.86 09/03/2018    .0 (H) 12/11/2019    GLC 93 09/03/2018     COAGS:   Lab Results   Component Value Date    PTT 30 09/03/2018    INR 0.92 09/03/2018     POC:   Lab Results   Component Value Date     (H) 09/03/2018     HEPATIC:   Lab Results   Component Value Date    ALBUMIN 4.0 09/03/2018    PROTTOTAL 8.0 09/03/2018    ALT 29 09/03/2018    AST 20 09/03/2018    ALKPHOS 64 09/03/2018    BILITOTAL 0.6 09/03/2018     OTHER:   Lab Results   Component Value Date    A1C 5.0 08/24/2018    MARIO 9.7 12/11/2019    MAG 2.2 06/01/2014    TSH 1.52 09/03/2018    CRP 17.2 (H) 10/22/2014       Anesthesia Plan    ASA Status:  2      Anesthesia Type: General.     - Airway: ETT   Induction: Intravenous.   Maintenance: TIVA.        Consents    Anesthesia Plan(s) and associated risks, benefits, and realistic alternatives discussed. Questions answered and patient/representative(s) expressed understanding.     - Discussed with:  Patient         Postoperative Care    Pain management: Oral pain medications, Multi-modal analgesia.   PONV prophylaxis: Ondansetron (or other 5HT-3), Dexamethasone or Solumedrol     Comments:                Cameron Connolly MD

## 2021-08-16 NOTE — DISCHARGE INSTRUCTIONS
[facility]  Home Care Following Anesthesia  For 24 hours after surgery:  1. Get plenty of rest.  A responsible adult must stay with you for at least 24 hours after you leave the surgery center.  2. Do not drive or use heavy equipment.  If you have weakness or tingling, don't drive or use heavy equipment until this feeling goes away.   3. Do not drink alcohol.   4. Avoid strenuous or risky activities.  Ask for help when climbing stairs.  5. You may feel lightheaded.  IF so, sit for a few minutes before standing.  Have someone help you get up.   6. If you have nausea (feel sick to your stomach): Drink only clear liquids such as apple juice, ginger ale, broth or 7-Up.  Rest may also help.  Be sure to drink enough fluids.  Move to a regular diet as you feel able.   7. You may have a slight fever.  Call the doctor if your fever is over 100 F (37.7 C) (taken under the tongue) or lasts longer than 24 hours.  8. You may have a dry mouth, a sore throat, muscle aches or trouble sleeping. These should go away after 24 hours.  9. Do not make important or legal decisions.   10. It is recommended to avoid smoking.               Tips for taking pain medications  To get the best pain relief possible, remember these points:    Take pain medications as directed, before pain becomes severe.    Pain medication can upset your stomach: taking it with food may help.    Constipation is a common side effect of pain medication. Drink plenty of  fluids.    Eat foods high in fiber. Take a stool softener if recommended by your doctor or pharmacist.    Do not drink alcohol, drive or operate machinery while taking pain medications.    Ask about other ways to control pain, such as with heat, ice or relaxation.    Tylenol/Acetaminophen Consumption  To help encourage the safe use of acetaminophen, the makers of TYLENOL  have lowered the maximum daily dose for single-ingredient Extra Strength TYLENOL   (acetaminophen) products sold in the [place] from 8 pills per day (4,000 mg) to 6 pills per day (3,000 mg). The dosing interval has also changed from 2 pills every 4-6 hours to 2 pills every 6 hours.    If you feel your pain relief is insufficient, you may take Tylenol/Acetaminophen in addition to your narcotic pain medication.     Be careful not to exceed 3,000 mg of Tylenol/Acetaminophen in a 24 hour period from all sources.    If you are taking extra strength Tylenol/acetaminophen (500 mg), the maximum dose is 6 tablets in 24 hours.    If you are taking regular strength acetaminophen (325 mg), the maximum dose is 9 tablets in 24 hours.    Call a doctor for any of the followin. Signs of infection (fever, growing tenderness at the surgery site, a large amount of drainage or bleeding, severe pain, foul-smelling drainage, redness, swelling).  2. It has been over 8 to 10 hours since surgery and you are still not able to urinate (pass water).  3. Headache for over 24 hours.  4. Numbness, tingling or weakness the day after surgery (if you had spinal anesthesia).  5. Signs of Covid-19 infection (temperature over 100 degrees, shortness of breath, cough, loss of taste/smell, generalized body aches, persistent headache, chills, sore throat, nausea/vomiting/diarrhea)  Your doctor is:  Dr. Phoenix Garcia, ENT Otolaryngology: [phone]                    Or dial [phone] and ask for the resident on call for:  ENT Otolaryngology  For emergency care, call the:  [facility]:  [phone] (TTY for hearing impaired: [phone])

## 2021-08-16 NOTE — ANESTHESIA POSTPROCEDURE EVALUATION
Patient: Lui Arrington    Procedure(s):  REDUCTION, BILATERAL INFERIOR NASAL TURBINATE    Diagnosis:Hypertrophy of both inferior nasal turbinates [J34.3]  Nasal obstruction [J34.89]  Diagnosis Additional Information: No value filed.    Anesthesia Type:  General    Note:  Disposition: Outpatient   Postop Pain Control: Uneventful            Sign Out: Well controlled pain   PONV: No   Neuro/Psych: Uneventful            Sign Out: Acceptable/Baseline neuro status   Airway/Respiratory: Uneventful            Sign Out: Acceptable/Baseline resp. status   CV/Hemodynamics: Uneventful            Sign Out: Acceptable CV status; No obvious hypovolemia; No obvious fluid overload   Other NRE: NONE   DID A NON-ROUTINE EVENT OCCUR? No           Last vitals:  Vitals Value Taken Time   /97 08/16/21 1330   Temp 35.7  C (96.3  F) 08/16/21 1330   Pulse 56 08/16/21 1330   Resp 14 08/16/21 1330   SpO2 99 % 08/16/21 1330       Electronically Signed By: Cameron Connolly MD  August 16, 2021  2:25 PM

## 2021-08-16 NOTE — BRIEF OP NOTE
[facility] [place]    Brief Operative Note    Pre-operative diagnosis: Hypertrophy of both inferior nasal turbinates [J34.3]  Nasal obstruction [J34.89]  Post-operative diagnosis Same as pre-operative diagnosis    Procedure: Procedure(s):  REDUCTION, BILATERAL INFERIOR NASAL TURBINATE  Surgeon: Surgeon(s) and Role:     * Phoenix Garcia MD - Primary  Anesthesia: General   Estimated blood loss: 15ml  Drains: None  Specimens: * No specimens in log *  Findings:   bilateral inferior turbinate hypertrophy, as expected.  Complications: None.  Implants: * No implants in log *    Kaela Deleon MD   Otolaryngology-Head & Neck Surgery PGY1  Please contact ENT with questions by dialing * [phone] and entering job code [phone] when prompted.

## 2021-08-17 ENCOUNTER — PATIENT OUTREACH (OUTPATIENT)
Dept: OTOLARYNGOLOGY | Facility: CLINIC | Age: 51
End: 2021-08-17

## 2021-08-17 NOTE — PROGRESS NOTES
Writer called patient to follow-up after surgery. [facility] for patient to return writers call.     Mariama Trinh RN on 8/17/2021 at 1:47 PM

## 2021-08-19 ENCOUNTER — NURSE TRIAGE (OUTPATIENT)
Dept: NURSING | Facility: CLINIC | Age: 51
End: 2021-08-19

## 2021-08-19 NOTE — TELEPHONE ENCOUNTER
Call from [facility], had REDUCTION, BILATERAL INFERIOR NASAL TURBINATE 8/16/21.  Developed headache which had been intermittent, today has been constant 7/10.  Has tried tylenol without relief, prefers not to continue use of oxycodone for headache, it did decrease it when had taken.  Asking about use of Ibuprofen, had been told not to use post op.   Distant past history of migraine headaches, no nausea, vomiting, vision/hearing changes.  Feels nose is less tender today, nasal drainage is dry blood and mucous today, yesterday had been fresh blood with mucous. Denies fever.  Recommended trying cold compress to forehead/back of neck while waiting for call back from ENT team today  Best number to reach [facility] [phone]    Reason for Disposition    Caller has NON-URGENT question and triager unable to answer question    Additional Information    Negative: Sounds like a life-threatening emergency to the triager    Negative: Chest pain    Negative: Difficulty breathing    Negative: Acting confused (e.g., disoriented, slurred speech) or excessively sleepy    Negative: Surgical incision symptoms and questions    Negative: Discomfort (pain, burning or stinging) when passing urine and male    Negative: Discomfort (pain, burning or stinging) when passing urine and female    Negative: Constipation    Negative: New or worsening leg (calf, thigh) pain    Negative: New or worsening leg swelling    Negative: Dizziness is severe, or persists > 24 hours after surgery    Negative: Symptoms arising from use of a urinary catheter (Brown or Coude)    Negative: Cast problems or questions    Negative: Medication question    Negative: Bright red, wide-spread, sunburn-like rash    Negative: SEVERE headache and after spinal (epidural) anesthesia    Negative: Vomiting and persists > 4 hours    Negative: Vomiting and abdomen looks much more swollen than usual    Negative: Drinking very little and dehydration suspected (e.g., no urine > 12 hours, very  "dry mouth, very lightheaded)    Negative: Patient sounds very sick or weak to the triager    Negative: Sounds like a serious complication to the triager    Negative: Fever > 100.4 F (38.0 C)    Negative: Caller has URGENT question and triager unable to answer question    Negative: SEVERE post-op pain (e.g., excruciating, pain scale 8-10) that is not controlled with pain medications    Negative: Headache and after spinal (epidural) anesthesia and not severe    Negative: Fever present > 3 days (72 hours)    Negative: Patient wants to be seen    Answer Assessment - Initial Assessment Questions  1. SYMPTOM: \"What's the main symptom you're concerned about?\" (e.g., pain, fever, vomiting)      Headache, centered over forehead above nose  2. ONSET: \"When did headache  start?\"      After surgery, today has been constant, had been intermittent  3. SURGERY: \"What surgery was performed?\"      REDUCTION, BILATERAL INFERIOR NASAL TURBINATE  4. DATE of SURGERY: \"When was surgery performed?\"       8/16/21  5. ANESTHESIA: \" What type of anesthesia did you have?\" (e.g., general, spinal, epidural, local)      general  6. PAIN: \"Is there any pain?\" If so, ask: \"How bad is it?\"  (Scale 1-10; or mild, moderate, severe)      Headache 7/10, constant. Nose is less tender, feels nasal pain improving  7. FEVER: \"Do you have a fever?\" If so, ask: \"What is your temperature, how was it measured, and when did it start?\"      denies  8. VOMITING: \"Is there any vomiting?\" If yes, ask: \"How many times?\"      Denies, denies nausea  9. BLEEDING: \"Is there any bleeding?\" If so, ask: \"How much?\" and \"Where?\"      From nose, today dry blood with mucous, yesterday was fresh blood  10. OTHER SYMPTOMS: \"Do you have any other symptoms?\" (e.g., drainage from wound, painful urination, constipation)        Denies vision, hearing changes.  Distant past history of migraine headaches.    Protocols used: POST-OP SYMPTOMS AND [id number]      "

## 2021-08-20 NOTE — TELEPHONE ENCOUNTER
LVM for patient regarding his symptoms post operatively.    Patient had a bilateral turbinate reduction with Dr. Garcia on Monday and is now complaining of ongoing 7/10 headache which is constant. Patient has history of migraines. Patient did state that the oxycodone did help to alleviate the symptoms but he did not want to take it for the headaches. Patient questioning using ibuprofen instead of Tylenol for pain as the Tylenol is not working however he was advised not to take post operatively.    Patient has no fever and was previously advised to use cold compresses to help with the discomfort until someone could follow up with him. Patient does mention some dried blood and mucous.    Included this writer's direct line for patient to call back to for ongoing pain concerns before the weekend. This writer will attempt one more call back before the weekend to ensure patient's symptoms are managed.    JASEN RAMIREZ, KAREN on 8/20/2021 at 11:41 AM

## 2021-08-23 ENCOUNTER — OFFICE VISIT (OUTPATIENT)
Dept: OTOLARYNGOLOGY | Facility: CLINIC | Age: 51
End: 2021-08-23
Payer: COMMERCIAL

## 2021-08-23 VITALS — BODY MASS INDEX: 33.94 KG/M2 | WEIGHT: 273 LBS | HEIGHT: 75 IN

## 2021-08-23 DIAGNOSIS — G89.18 ACUTE POST-OPERATIVE PAIN: Primary | ICD-10-CM

## 2021-08-23 PROCEDURE — 99024 POSTOP FOLLOW-UP VISIT: CPT | Performed by: OTOLARYNGOLOGY

## 2021-08-23 RX ORDER — OXYCODONE AND ACETAMINOPHEN 5; 325 MG/1; MG/1
1 TABLET ORAL EVERY 6 HOURS PRN
Qty: 10 TABLET | Refills: 0 | Status: SHIPPED | OUTPATIENT
Start: 2021-08-23 | End: 2021-08-26

## 2021-08-23 RX ORDER — PREDNISONE 20 MG/1
40 TABLET ORAL DAILY
Qty: 10 TABLET | Refills: 0 | Status: SHIPPED | OUTPATIENT
Start: 2021-08-23 | End: 2021-08-28

## 2021-08-23 ASSESSMENT — MIFFLIN-ST. JEOR: SCORE: 2168.95

## 2021-08-23 ASSESSMENT — PAIN SCALES - GENERAL: PAINLEVEL: MODERATE PAIN (5)

## 2021-08-23 NOTE — LETTER
8/23/2021       RE: Lui Arrington  [address] [place] [address]     Dear Colleague,    Thank you for referring your patient, Lui Arrington, to the [facility] [place] at [facility]. Please see a copy of my visit note below.    HISTORY OF PRESENT ILLNESS:  Adam Arrington is back to see us today.  He is breathing well through his nose, but has had some headaches since surgery.    PHYSICAL EXAMINATION:  Exam shows everything in appropriate appearance with some persistent crusting.  Otherwise, the patient is in no distress.    ASSESSMENT AND PLAN:  At this point, the plan will be for a brief burst of prednisone.  I have refilled his pain medication in case he needs it and we will have him follow up with us in two to three weeks.          Again, thank you for allowing me to participate in the care of your patient.      Sincerely,    Phoenix Garcia MD

## 2021-08-23 NOTE — PROGRESS NOTES
HISTORY OF PRESENT ILLNESS:  Adam Arrington is back to see us today.  He is breathing well through his nose, but has had some headaches since surgery.    PHYSICAL EXAMINATION:  Exam shows everything in appropriate appearance with some persistent crusting.  Otherwise, the patient is in no distress.    ASSESSMENT AND PLAN:  At this point, the plan will be for a brief burst of prednisone.  I have refilled his pain medication in case he needs it and we will have him follow up with us in two to three weeks.

## 2021-09-01 ENCOUNTER — TELEPHONE (OUTPATIENT)
Dept: FAMILY MEDICINE | Facility: CLINIC | Age: 51
End: 2021-09-01

## 2021-09-01 NOTE — TELEPHONE ENCOUNTER
2021   9:42 AM     Data/Intervention:  Patient Name:  Lui Arrington  /Age:  1970 (51 year old)    Spoke with: Adam     Reason for Call:  ACD f/u     Assessment / Plan:  OTTO reached out to Adam to see if they had any questions or to see if they needed help filling out the Advanced Care Directive Form that Dr. Carballo gave them on 21. Adam stated at this time they have no questions regarding the ACD form and that they just need the motivation to complete it. They thanked Otto for the reminder call and scheduled at appointment to meet with OTTO at 8:00am on the  to get the ACD form notarized.      KATHY Garza  Pronouns: She/Her/Hers  , Care Coordination  [facility]  [phone]

## 2021-09-12 ENCOUNTER — HEALTH MAINTENANCE LETTER (OUTPATIENT)
Age: 51
End: 2021-09-12

## 2021-09-13 ENCOUNTER — OFFICE VISIT (OUTPATIENT)
Dept: OTOLARYNGOLOGY | Facility: CLINIC | Age: 51
End: 2021-09-13
Payer: COMMERCIAL

## 2021-09-13 VITALS — WEIGHT: 282.19 LBS | HEIGHT: 75 IN | BODY MASS INDEX: 35.09 KG/M2

## 2021-09-13 DIAGNOSIS — R09.81 NASAL CONGESTION: Primary | ICD-10-CM

## 2021-09-13 PROCEDURE — 99213 OFFICE O/P EST LOW 20 MIN: CPT | Performed by: OTOLARYNGOLOGY

## 2021-09-13 ASSESSMENT — MIFFLIN-ST. JEOR: SCORE: 2210.63

## 2021-09-13 ASSESSMENT — PAIN SCALES - GENERAL: PAINLEVEL: NO PAIN (0)

## 2021-09-13 NOTE — PROGRESS NOTES
HISTORY OF PRESENT ILLNESS: Lui is back to see us again today.  He is doing well with regards to his nose.  He has improved nasal breathing since his turbinate reduction.  He continues to have some sense of pressure in his head intermittently, but it is also improved.  He is not having substantial amounts of abnormal rhinorrhea.  He has otherwise had no substantial additional concerns.    PHYSICAL EXAMINATION:  This is a 51-year-old gentleman in no acute distress.  Normal mood and affect, normal to communicate.  Alert and appropriate.  Head is normocephalic.  Cranial nerve VII is House-Brackmann I/VI bilaterally.  Breathing without any difficulty or stridor.  Eyes are anicteric.  The head and neck appears normal.  Examination of the nose shows well reduced inferior turbinates with some dry crusting.    ASSESSMENT: A 51-year-old with improved nasal congestion and headache.    PLAN:  Continue nasal hydration and follow up in the future if he has had any persistent symptoms.

## 2021-09-13 NOTE — PATIENT INSTRUCTIONS
1. You were seen in the clinic today by Dr. Garcia.  2.   If you have any questions or concerns after your appointment, please call the clinic.    -Clinic phone [phone]. Press option #1 for scheduling related needs. Press option #3 for nurse advice.    Gina Blanca, NICKYN  [phone]    Mariama Trinh, RNCC  [phone]    [facility]  Department of Otolaryngology    
[facility]

## 2021-09-13 NOTE — LETTER
9/13/2021       RE: Lui Arrington  [address] [place] [address]     Dear Colleague,    Thank you for referring your patient, Lui Arrington, to the [facility] [place] at [facility]. Please see a copy of my visit note below.    HISTORY OF PRESENT ILLNESS: Lui is back to see us again today.  He is doing well with regards to his nose.  He has improved nasal breathing since his turbinate reduction.  He continues to have some sense of pressure in his head intermittently, but it is also improved.  He is not having substantial amounts of abnormal rhinorrhea.  He has otherwise had no substantial additional concerns.    PHYSICAL EXAMINATION:  This is a 51-year-old gentleman in no acute distress.  Normal mood and affect, normal to communicate.  Alert and appropriate.  Head is normocephalic.  Cranial nerve VII is House-Brackmann I/VI bilaterally.  Breathing without any difficulty or stridor.  Eyes are anicteric.  The head and neck appears normal.  Examination of the nose shows well reduced inferior turbinates with some dry crusting.    ASSESSMENT: A 51-year-old with improved nasal congestion and headache.    PLAN:  Continue nasal hydration and follow up in the future if he has had any persistent symptoms.          Again, thank you for allowing me to participate in the care of your patient.      Sincerely,    Phoenix Garcia MD

## 2021-10-11 ENCOUNTER — DOCUMENTATION ONLY (OUTPATIENT)
Dept: OTHER | Facility: CLINIC | Age: 51
End: 2021-10-11

## 2021-11-18 ENCOUNTER — MYC MEDICAL ADVICE (OUTPATIENT)
Dept: FAMILY MEDICINE | Facility: CLINIC | Age: 51
End: 2021-11-18
Payer: COMMERCIAL

## 2021-11-18 DIAGNOSIS — I10 ESSENTIAL HYPERTENSION: ICD-10-CM

## 2021-11-18 DIAGNOSIS — E78.49 OTHER HYPERLIPIDEMIA: ICD-10-CM

## 2021-11-18 DIAGNOSIS — I10 BENIGN ESSENTIAL HYPERTENSION: ICD-10-CM

## 2021-11-19 RX ORDER — AMLODIPINE BESYLATE 2.5 MG/1
2.5 TABLET ORAL DAILY
Qty: 90 TABLET | Refills: 1 | Status: SHIPPED | OUTPATIENT
Start: 2021-11-19 | End: 2022-08-31

## 2021-11-19 RX ORDER — ATORVASTATIN CALCIUM 40 MG/1
40 TABLET, FILM COATED ORAL EVERY EVENING
Qty: 90 TABLET | Refills: 0 | Status: SHIPPED | OUTPATIENT
Start: 2021-11-19 | End: 2022-02-10

## 2021-11-19 RX ORDER — LISINOPRIL 10 MG/1
10 TABLET ORAL DAILY
Qty: 90 TABLET | Refills: 3 | Status: SHIPPED | OUTPATIENT
Start: 2021-11-19 | End: 2022-10-04

## 2021-11-19 NOTE — TELEPHONE ENCOUNTER

## 2022-01-02 ENCOUNTER — HEALTH MAINTENANCE LETTER (OUTPATIENT)
Age: 52
End: 2022-01-02

## 2022-02-10 DIAGNOSIS — E78.49 OTHER HYPERLIPIDEMIA: ICD-10-CM

## 2022-02-10 RX ORDER — ATORVASTATIN CALCIUM 40 MG/1
40 TABLET, FILM COATED ORAL EVERY EVENING
Qty: 90 TABLET | Refills: 0 | Status: SHIPPED | OUTPATIENT
Start: 2022-02-10 | End: 2022-05-17

## 2022-02-10 NOTE — TELEPHONE ENCOUNTER
"Request for medication refill:  atorvastatin (LIPITOR) 40 MG tablet  Providers if patient needs an appointment and you are willing to give a one month supply please refill for one month and  send a letter/MyChart using \".SMILLIMITEDREFILL\" .smillimited and route chart to \"P SMI \" (Giving one month refill in non controlled medications is strongly recommended before denial)    If refill has been denied, meaning absolutely no refills without visit, please complete the smart phrase \".smirxrefuse\" and route it to the \"P SMI MED REFILLS\"  pool to inform the patient and the pharmacy.    Ban Pete        "

## 2022-05-17 DIAGNOSIS — E78.49 OTHER HYPERLIPIDEMIA: ICD-10-CM

## 2022-05-17 RX ORDER — ATORVASTATIN CALCIUM 40 MG/1
40 TABLET, FILM COATED ORAL EVERY EVENING
Qty: 90 TABLET | Refills: 0 | Status: SHIPPED | OUTPATIENT
Start: 2022-05-17 | End: 2022-10-11

## 2022-05-17 NOTE — TELEPHONE ENCOUNTER

## 2022-08-08 ASSESSMENT — ENCOUNTER SYMPTOMS
NAUSEA: 0
WEAKNESS: 0
EYE PAIN: 0
HEMATOCHEZIA: 0
PALPITATIONS: 0
ARTHRALGIAS: 0
SORE THROAT: 0
DIARRHEA: 0
JOINT SWELLING: 0
HEARTBURN: 0
FREQUENCY: 0
SHORTNESS OF BREATH: 0
COUGH: 0
DYSURIA: 0
PARESTHESIAS: 0
HEMATURIA: 0
CONSTIPATION: 0
HEADACHES: 1
NERVOUS/ANXIOUS: 1
FEVER: 0
MYALGIAS: 0
CHILLS: 0
ABDOMINAL PAIN: 0
DIZZINESS: 0

## 2022-08-11 ENCOUNTER — OFFICE VISIT (OUTPATIENT)
Dept: FAMILY MEDICINE | Facility: CLINIC | Age: 52
End: 2022-08-11
Payer: COMMERCIAL

## 2022-08-11 VITALS
DIASTOLIC BLOOD PRESSURE: 76 MMHG | TEMPERATURE: 98.7 F | RESPIRATION RATE: 16 BRPM | SYSTOLIC BLOOD PRESSURE: 137 MMHG | OXYGEN SATURATION: 99 % | HEART RATE: 48 BPM

## 2022-08-11 DIAGNOSIS — R39.12 WEAK URINARY STREAM: ICD-10-CM

## 2022-08-11 DIAGNOSIS — Z00.00 HEALTH CARE MAINTENANCE: ICD-10-CM

## 2022-08-11 DIAGNOSIS — D22.9 ATYPICAL NEVUS OF MULTIPLE SITES: ICD-10-CM

## 2022-08-11 DIAGNOSIS — N52.9 VASCULOGENIC ERECTILE DYSFUNCTION, UNSPECIFIED VASCULOGENIC ERECTILE DYSFUNCTION TYPE: ICD-10-CM

## 2022-08-11 DIAGNOSIS — R53.83 FATIGUE, UNSPECIFIED TYPE: Primary | ICD-10-CM

## 2022-08-11 DIAGNOSIS — M25.40 SWELLING OF MULTIPLE JOINTS: ICD-10-CM

## 2022-08-11 LAB
ANION GAP SERPL CALCULATED.3IONS-SCNC: 9 MMOL/L (ref 7–15)
BUN SERPL-MCNC: 16.2 MG/DL (ref 6–20)
CALCIUM SERPL-MCNC: 9.1 MG/DL (ref 8.6–10)
CHLORIDE SERPL-SCNC: 105 MMOL/L (ref 98–107)
CHOLEST SERPL-MCNC: 105 MG/DL
CREAT SERPL-MCNC: 1.03 MG/DL (ref 0.67–1.17)
CRP SERPL-MCNC: <3 MG/L
DEPRECATED HCO3 PLAS-SCNC: 26 MMOL/L (ref 22–29)
ERYTHROCYTE [DISTWIDTH] IN BLOOD BY AUTOMATED COUNT: 13.6 % (ref 10–15)
ERYTHROCYTE [SEDIMENTATION RATE] IN BLOOD BY WESTERGREN METHOD: 15 MM/HR (ref 0–20)
FERRITIN SERPL-MCNC: 331 NG/ML (ref 31–409)
GFR SERPL CREATININE-BSD FRML MDRD: 86 ML/MIN/1.73M2
GLUCOSE SERPL-MCNC: 99 MG/DL (ref 70–99)
HCT VFR BLD AUTO: 44.8 % (ref 40–53)
HDLC SERPL-MCNC: 42 MG/DL
HGB BLD-MCNC: 14.1 G/DL (ref 13.3–17.7)
LDLC SERPL CALC-MCNC: 34 MG/DL
MCH RBC QN AUTO: 27.5 PG (ref 26.5–33)
MCHC RBC AUTO-ENTMCNC: 31.5 G/DL (ref 31.5–36.5)
MCV RBC AUTO: 88 FL (ref 78–100)
NONHDLC SERPL-MCNC: 63 MG/DL
PLATELET # BLD AUTO: 195 10E3/UL (ref 150–450)
POTASSIUM SERPL-SCNC: 5.2 MMOL/L (ref 3.4–5.3)
PSA SERPL-MCNC: 1.94 NG/ML (ref 0–3.5)
RBC # BLD AUTO: 5.12 10E6/UL (ref 4.4–5.9)
SODIUM SERPL-SCNC: 140 MMOL/L (ref 136–145)
TRIGL SERPL-MCNC: 144 MG/DL
TSH SERPL DL<=0.005 MIU/L-ACNC: 1.81 UIU/ML (ref 0.3–4.2)
WBC # BLD AUTO: 7.5 10E3/UL (ref 4–11)

## 2022-08-11 PROCEDURE — 80048 BASIC METABOLIC PNL TOTAL CA: CPT | Performed by: STUDENT IN AN ORGANIZED HEALTH CARE EDUCATION/TRAINING PROGRAM

## 2022-08-11 PROCEDURE — 36415 COLL VENOUS BLD VENIPUNCTURE: CPT | Performed by: STUDENT IN AN ORGANIZED HEALTH CARE EDUCATION/TRAINING PROGRAM

## 2022-08-11 PROCEDURE — 85027 COMPLETE CBC AUTOMATED: CPT | Performed by: STUDENT IN AN ORGANIZED HEALTH CARE EDUCATION/TRAINING PROGRAM

## 2022-08-11 PROCEDURE — 99214 OFFICE O/P EST MOD 30 MIN: CPT | Mod: GC | Performed by: STUDENT IN AN ORGANIZED HEALTH CARE EDUCATION/TRAINING PROGRAM

## 2022-08-11 PROCEDURE — 80061 LIPID PANEL: CPT | Performed by: STUDENT IN AN ORGANIZED HEALTH CARE EDUCATION/TRAINING PROGRAM

## 2022-08-11 PROCEDURE — G0103 PSA SCREENING: HCPCS | Performed by: STUDENT IN AN ORGANIZED HEALTH CARE EDUCATION/TRAINING PROGRAM

## 2022-08-11 PROCEDURE — 84443 ASSAY THYROID STIM HORMONE: CPT | Performed by: STUDENT IN AN ORGANIZED HEALTH CARE EDUCATION/TRAINING PROGRAM

## 2022-08-11 PROCEDURE — 85652 RBC SED RATE AUTOMATED: CPT | Performed by: STUDENT IN AN ORGANIZED HEALTH CARE EDUCATION/TRAINING PROGRAM

## 2022-08-11 PROCEDURE — 82728 ASSAY OF FERRITIN: CPT | Performed by: STUDENT IN AN ORGANIZED HEALTH CARE EDUCATION/TRAINING PROGRAM

## 2022-08-11 PROCEDURE — 86140 C-REACTIVE PROTEIN: CPT | Performed by: STUDENT IN AN ORGANIZED HEALTH CARE EDUCATION/TRAINING PROGRAM

## 2022-08-11 PROCEDURE — 86803 HEPATITIS C AB TEST: CPT | Performed by: STUDENT IN AN ORGANIZED HEALTH CARE EDUCATION/TRAINING PROGRAM

## 2022-08-11 RX ORDER — SILDENAFIL CITRATE 20 MG/1
20 TABLET ORAL PRN
Qty: 30 TABLET | Refills: 11 | Status: SHIPPED | OUTPATIENT
Start: 2022-08-11 | End: 2022-10-04

## 2022-08-11 ASSESSMENT — ENCOUNTER SYMPTOMS
COUGH: 0
FEVER: 0
CHILLS: 0
CONSTIPATION: 0
DIZZINESS: 0
ARTHRALGIAS: 0
MYALGIAS: 0
JOINT SWELLING: 0
SORE THROAT: 0
HEMATURIA: 0
DIARRHEA: 0
HEMATOCHEZIA: 0
HEADACHES: 1
NERVOUS/ANXIOUS: 1
HEARTBURN: 0
EYE PAIN: 0
FREQUENCY: 0
PARESTHESIAS: 0
SHORTNESS OF BREATH: 0
ABDOMINAL PAIN: 0
WEAKNESS: 0
DYSURIA: 0
PALPITATIONS: 0
NAUSEA: 0

## 2022-08-11 NOTE — PROGRESS NOTES
SUBJECTIVE:   CC: Lui Arrington is an 52 year old male who presents for preventative health visit.     {Split Bill scripting  The purpose of this visit is to discuss your medical history and prevent health problems before you are sick. You may be responsible for a co-pay, coinsurance, or deductible if your visit today includes services such as checking on a sore throat, having an x-ray or lab test, or treating and evaluating a new or existing condition :879081}  {Patient advised of split billing (Optional):746670}  Healthy Habits:     Getting at least 3 servings of Calcium per day:  Yes    Bi-annual eye exam:  Yes    Dental care twice a year:  NO    Sleep apnea or symptoms of sleep apnea:  Excessive snoring    Diet:  Other    Frequency of exercise:  4-5 days/week    Duration of exercise:  30-45 minutes    Taking medications regularly:  Yes    Medication side effects:  Lightheadedness    PHQ-2 Total Score: 0    Additional concerns today:  Yes    {Add if <65 person on Medicare  - Required Questions (Optional):936995}  {Outside tests to abstract? :320091}    {additional problems to add (Optional):574144}    Today's PHQ-2 Score:   PHQ-2 (  [employer]) 2022   Q1: Little interest or pleasure in doing things 0   Q2: Feeling down, depressed or hopeless 0   PHQ-2 Score 0   PHQ-2 Total Score (12-17 Years)- Positive if 3 or more points; Administer PHQ-A if positive -   Q1: Little interest or pleasure in doing things Not at all   Q2: Feeling down, depressed or hopeless Not at all   PHQ-2 Score 0       Abuse: Current or Past(Physical, Sexual or Emotional)- No  Do you feel safe in your environment? Yes        Social History     Tobacco Use     Smoking status: Former Smoker     Quit date: 1998     Years since quittin.6     Smokeless tobacco: Never Used   Substance Use Topics     Alcohol use: No     {Rooming Staff- Complete this question if Prescreen response is not shown below for today's visit. If you drink  "alcohol do you typically have >3 drinks per day or >7 drinks per week? (Optional):072746}    Alcohol Use 8/8/2022   Prescreen: >3 drinks/day or >7 drinks/week? Not Applicable   {add AUDIT responses (Optional) (A score of 7 for adult men is an indication of hazardous drinking; a score of 8 or more is an indication of an alcohol use disorder.  A score of 7 or more for adult women is an indication of hazardous drinking or an alchohol use disorder):174635}    Last PSA:   Prostate Specific Antigen Screen   Date Value Ref Range Status   05/19/2011 1.6 <2.6 ng/mL Final     Comment:     Method is Abbott Prostate-Specific Antigen (PSA)            Standard-WHO 1st International (90:10) as of 09/26/05         Reviewed orders with patient. Reviewed health maintenance and updated orders accordingly - { :554235::\"Yes\"}  {Chronicprobdata (optional):544085}    Reviewed and updated as needed this visit by clinical staff   Tobacco  Allergies  Meds  Problems  Med Hx  Surg Hx  Fam Hx  Soc   Hx          Reviewed and updated as needed this visit by Provider   Tobacco  Allergies  Meds  Problems  Med Hx  Surg Hx  Fam Hx           {HISTORY OPTIONS (Optional):196355}    Review of Systems   Constitutional: Negative for chills and fever.   HENT: Negative for congestion, ear pain, hearing loss and sore throat.    Eyes: Negative for pain and visual disturbance.   Respiratory: Negative for cough and shortness of breath.    Cardiovascular: Negative for chest pain, palpitations and peripheral edema.   Gastrointestinal: Negative for abdominal pain, constipation, diarrhea, heartburn, hematochezia and nausea.   Genitourinary: Positive for impotence and urgency. Negative for dysuria, frequency, genital sores, hematuria and penile discharge.   Musculoskeletal: Negative for arthralgias, joint swelling and myalgias.   Skin: Negative for rash.   Neurological: Positive for headaches. Negative for dizziness, weakness and paresthesias. " "  Psychiatric/Behavioral: Positive for mood changes. The patient is nervous/anxious.      {MALE ROS (Optional):940391::\"CONSTITUTIONAL: NEGATIVE for fever, chills, change in weight\",\"INTEGUMENTARY/SKIN: NEGATIVE for worrisome rashes, moles or lesions\",\"EYES: NEGATIVE for vision changes or irritation\",\"ENT: NEGATIVE for ear, mouth and throat problems\",\"RESP: NEGATIVE for significant cough or SOB\",\"CV: NEGATIVE for chest pain, palpitations or peripheral edema\",\"GI: NEGATIVE for nausea, abdominal pain, heartburn, or change in bowel habits\",\" male: negative for dysuria, hematuria, decreased urinary stream, erectile dysfunction, urethral discharge\",\"MUSCULOSKELETAL: NEGATIVE for significant arthralgias or myalgia\",\"NEURO: NEGATIVE for weakness, dizziness or paresthesias\",\"PSYCHIATRIC: NEGATIVE for changes in mood or affect\"}    OBJECTIVE:   /76 (BP Location: Right arm, Patient Position: Sitting, Cuff Size: Adult Large)   Pulse (!) 48   Temp 98.7  F (37.1  C) (Oral)   Resp 16   SpO2 99%     Physical Exam  {Exam Choices (Optional):041523}    {Diagnostic Test Results (Optional):705313::\"Diagnostic Test Results:\",\"Labs reviewed in Epic\"}    ASSESSMENT/PLAN:   {Diag Picklist:950991}    {Patient advised of split billing (Optional):060436}    COUNSELING:   {MALE COUNSELING MESSAGES:395422::\"Reviewed preventive health counseling, as reflected in patient instructions\"}    Estimated body mass index is 35.27 kg/m  as calculated from the following:    Height as of 9/13/21: 1.905 m (6' 3\").    Weight as of 9/13/21: 128 kg (282 lb 3 oz).     {Weight Management Plan (ACO) Complete if BMI is abnormal-  Ages 18-64  BMI >24.9.  Age 65+ with BMI <23 or >30 (Optional):711992}    He reports that he quit smoking about 24 years ago. He has never used smokeless tobacco.      Counseling Resources:  ATP IV Guidelines  Pooled Cohorts Equation Calculator  FRAX Risk Assessment  ICSI Preventive Guidelines  Dietary Guidelines for " Americans, 2010  KIT digital's MyPlate  ASA Prophylaxis  Lung CA Screening    Diane Carballo MD  [facility]            BP meds  - Feel low energy-  -- forget to take pills feels better, wants adjustment  - Lightheaded when dehydrated on them  - Mild ED,  -intermittently checking BP at home  - Doesn't take BP when feeling tired or low energy    - Sleep study-   - Energy low since surgery and weight gain    Sildenafil  - after taking worried  - weak streak  - Erection not as large  - Increased frequency potentially-- varies with liquid    Swelling in legs and fingers  - increased weight and not eating  - Concerned  - SOB only with exertion more sore than previously  - No chest pain  - Feels like heart takes awhile to catch up  - Naturally slower    Saccular aneurysm  - discovered incidental when hospitalized for cardiac issues  - Anastamoses of nerves in hand led to numbness since carpal tunnel      Turbinate reduction    New stress test    No blood or black stools  Try new BP med  - ACE  - Thiazide dieuretic    Trace swelling in legs    Follow up 2 weeks  - altering when taking meds  - Take out atorvastatin

## 2022-08-11 NOTE — PROGRESS NOTES
"  Assessment & Plan     Fatigue, unspecified type  Patient states chronic fatigue that has increased in severity in recent months. Patient is uncertain of cause but has noted this is less of an issue on the occasions when he has forgotten to take his medications. Patient stated recent increased weight gain without difficulty of sleep.  He also reports increased SOB on exercise but no associated chest pain. Currently taking Amlodipine 2.5 mg, Atorvastatin 40 mg, Lisinopril 10 mg daily. On physical exam patient exhibits trace edema of b/l le and complaints of pain in fingers and ankles. Vital signs were pertinent for BP: 137/76 P: 48. Plan is to have patient alternate withholding one of the three medications (amlodipin, atorvastatin, lisinopril) for 2-3 days at a time to see if there is decrease in fatigue. Current differential is medication related v.s hypothyroidism v.s anemia v.s cardiovascular.  Pending patient laboratory results and medication withhold trial next steps would be further cardiac workup, including EKG, possible Zio patch, cardiac stress testing and cardiology referral. CT Angiogram of coronary vessels obtained Sept 2018 found \"high plaque burden with serial stenosis in the proximal to mid LAD  that are moderate-severe in severity. Minimal disease in the RCA and circumflex. Total Agatston score 189 placing the patient in the 95th percentile when compared to age and gender matched control group.\" Stress echo also performed at that time that was stopped early due to fatigue.   - TSH with free T4 reflex; Future  - CBC with platelets; Future  - Ferritin; Future      Vasculogenic erectile dysfunction, unspecified vasculogenic erectile dysfunction type  Medication refilled per patient request  - sildenafil (REVATIO) 20 MG tablet; Take 1 tablet (20 mg) by mouth as needed (ED)      Swelling of multiple joints  Patient stated swelling in fingers and ankles with trace edema in b/le. LE edema may be side effect " of amlodipine however with further evaluate for potential rheumatologic or autoimmune pathology with CRP and ESR today  - Erythrocyte sedimentation rate auto; Future  - CRP inflammation; Future      Weak urinary stream  Patient concern with chronic difficulty starting urinary stream and overall decreased urinary stream. Patient has no history of elevated PSA or FMHx of prostate cancer. Patient concern, advanced age and symptoms leads to leading differential of BPH. IPSS score of 10 today.   - Prostate spec antigen screen    Atypical nevus of multiple sites  Patient concern for multiple moles on chest and back. FMHx of melanoma (grandfather and father). On physical exam patient showed particular mole of interest with atypical center on right midthoracic back. Plan for nevus biopsy and evaluation with potential dermatology referral.  - [facility] - [facility] Internal Referral; Future    Healthcare maintenance  Patient has PMHx of coronary artery disease, benign hypertension and history of smoking. Laboratory tests collected according to patient preventative care guideline recommendations. Due to time constraints and patient more pressing concerns general medical examination must unable to be fully completed.   - Basic metabolic panel; Future  - Lipid panel; Future  - Hepatitis C antibody; Future  -Patient follow-up visit; 2 weeks       No follow-ups on file.    Austin Marquez, MS3  [employer]     I was present with the medical student who participated in the service and in the documentation of this note. I have verified the history and personally performed the physical exam and medical decision making, and have verified the content of the note, which accurately reflects my assessment of the patient and the plan of care.   Diane Carballo MD  [facility] KRUPA Medina is a 52 year old, presenting for the following health issues:  Physical (Patient  presents for annual physical: would like to discuss adjusting BP meds, patient feels low energy when he takes. )      Fatigue  - Patient concerned with fatigue unrelated to sleep.  - uncertain if it is due to current medication regimen.   - States they feel better when they don't take their medication  - Increased SOB when walking up stairs or exercising  - Negative for chest pains  - Chronic bradycardia  - Medications of note amlodipine, sildenafil, atorvastatin, lisinopril.  - Patient has diagnosis of Benign essential hypertenion and coronary artery disease.  - Checks BP at home but has never measured during theses events    Weight gain  - States has increased weight gain  - Feels they would feel much better after losing 20lbs  - States has education but lacking will power/energy to do so  - Denies difficulty sleeping    Decreased urinary flow  - Patient states they have had chronic decreased urinary stream  - On international prostate symptom score (I-PSS) score of 10  - Main concern weak stream and straining to urinate  - Currently takes sildenafil for ED but states occurs in context without taking the medication  - No decreased penile sensation or urinary continence  - Patient concerned for prostate cancer    Atypical Nevus  - Patient stated concern for multiple moles on chest and back  - Stated previously bringing concerns to dermatologist but was told to go to their PCP   - They stated multiple members of their family (grandfather and father) have had skin cancer so they want to be very certain they don't.    Healthy Habits:     Getting at least 3 servings of Calcium per day:  Yes    Bi-annual eye exam:  Yes    Dental care twice a year:  NO    Sleep apnea or symptoms of sleep apnea:  Excessive snoring    Diet:  Other    Frequency of exercise:  4-5 days/week    Duration of exercise:  30-45 minutes    Taking medications regularly:  Yes    Medication side effects:  Lightheadedness    PHQ-2 Total Score: 0     Additional concerns today:  Yes          Review of Systems   Constitutional: Negative for chills and fever.   HENT: Negative for congestion, ear pain, hearing loss and sore throat.    Eyes: Negative for pain and visual disturbance.   Respiratory: Negative for cough and shortness of breath.    Cardiovascular: Negative for chest pain, palpitations and peripheral edema.   Gastrointestinal: Negative for abdominal pain, constipation, diarrhea, heartburn, hematochezia and nausea.   Genitourinary: Positive for impotence and urgency. Negative for dysuria, frequency, genital sores, hematuria and penile discharge.   Musculoskeletal: Negative for arthralgias, joint swelling and myalgias.   Skin: Negative for rash.   Neurological: Positive for headaches. Negative for dizziness, weakness and paresthesias.   Psychiatric/Behavioral: Positive for mood changes. The patient is nervous/anxious.       Constitutional, HEENT, cardiovascular, pulmonary, gi and gu systems are negative, except as otherwise noted.      Objective    /76 (BP Location: Right arm, Patient Position: Sitting, Cuff Size: Adult Large)   Pulse (!) 48   Temp 98.7  F (37.1  C) (Oral)   Resp 16   SpO2 99%   There is no height or weight on file to calculate BMI.  Physical Exam   GENERAL: healthy, alert and no distress  EYES: Eyes grossly normal to inspection, PERRL and conjunctivae and sclerae normal  NECK: no adenopathy, no asymmetry, masses, or scars and thyroid normal to palpation  RESP: lungs clear to auscultation - no rales, rhonchi or wheezes  CV: bradycardic with regular rhythm, normal S1 S2, no S3 or S4, no murmur, trace bilateral LE edema  ABDOMEN: soft, nontender, no hepatosplenomegaly, no masses and bowel sounds normal  MS: no gross musculoskeletal defects noted  SKIN: many nevi across back and chest  NEURO: Normal strength and tone, mentation intact and speech normal  PSYCH: mentation appears normal, affect normal/bright

## 2022-08-11 NOTE — PROGRESS NOTES
Preceptor Attestation:    I discussed the patient with the resident and evaluated the patient in person. I have verified the content of the note, which accurately reflects my assessment of the patient and the plan of care.   Supervising Physician:  Shawna Crooks DO.

## 2022-08-12 LAB — HCV AB SERPL QL IA: NONREACTIVE

## 2022-08-25 ENCOUNTER — DOCUMENTATION ONLY (OUTPATIENT)
Dept: FAMILY MEDICINE | Facility: CLINIC | Age: 52
End: 2022-08-25

## 2022-08-25 NOTE — PROGRESS NOTES
"When opening a documentation only encounter, be sure to enter in \"Chief Complaint\" Forms and in \" Comments\" Title of form, description if needed.    Adam is a 52 year old  male  Form received via: Fax  Form now resides in: Provider Ready    Zandra Gu MA                  "

## 2022-08-31 DIAGNOSIS — I10 ESSENTIAL HYPERTENSION: ICD-10-CM

## 2022-08-31 RX ORDER — AMLODIPINE BESYLATE 2.5 MG/1
2.5 TABLET ORAL DAILY
Qty: 90 TABLET | Refills: 1 | Status: SHIPPED | OUTPATIENT
Start: 2022-08-31 | End: 2022-10-04

## 2022-08-31 NOTE — TELEPHONE ENCOUNTER
"Request for medication refill:  amLODIPine (NORVASC) 2.5 MG tablet  Providers if patient needs an appointment and you are willing to give a one month supply please refill for one month and  send a letter/MyChart using \".SMILLIMITEDREFILL\" .smillimited and route chart to \"P [facility] \" (Giving one month refill in non controlled medications is strongly recommended before denial)    If refill has been denied, meaning absolutely no refills without visit, please complete the smart phrase \".smirxrefuse\" and route it to the \"P SMI MED REFILLS\"  pool to inform the patient and the pharmacy.    Ban Pete        "

## 2022-08-31 NOTE — PROGRESS NOTES
Form has been completed by provider.     Form sent out via: Fax to [employer] Form at Fax Number: [phone]  Patient informed: No, Reason:  Output date: August 31, 2022    Zandra Gu MA      **Please close the encounter**

## 2022-10-04 ENCOUNTER — OFFICE VISIT (OUTPATIENT)
Dept: FAMILY MEDICINE | Facility: CLINIC | Age: 52
End: 2022-10-04
Payer: COMMERCIAL

## 2022-10-04 VITALS
HEART RATE: 51 BPM | BODY MASS INDEX: 36.03 KG/M2 | HEIGHT: 75 IN | RESPIRATION RATE: 16 BRPM | OXYGEN SATURATION: 99 % | DIASTOLIC BLOOD PRESSURE: 83 MMHG | WEIGHT: 289.8 LBS | TEMPERATURE: 97.9 F | SYSTOLIC BLOOD PRESSURE: 135 MMHG

## 2022-10-04 DIAGNOSIS — Z23 HIGH PRIORITY FOR 2019-NCOV VACCINE: ICD-10-CM

## 2022-10-04 DIAGNOSIS — I10 BENIGN ESSENTIAL HYPERTENSION: Primary | ICD-10-CM

## 2022-10-04 DIAGNOSIS — Z23 NEED FOR PROPHYLACTIC VACCINATION AND INOCULATION AGAINST INFLUENZA: ICD-10-CM

## 2022-10-04 DIAGNOSIS — R00.1 BRADYCARDIA: ICD-10-CM

## 2022-10-04 DIAGNOSIS — I25.10 CORONARY ARTERY DISEASE INVOLVING NATIVE CORONARY ARTERY OF NATIVE HEART WITHOUT ANGINA PECTORIS: ICD-10-CM

## 2022-10-04 DIAGNOSIS — E66.01 MORBID OBESITY (H): ICD-10-CM

## 2022-10-04 DIAGNOSIS — N52.9 VASCULOGENIC ERECTILE DYSFUNCTION, UNSPECIFIED VASCULOGENIC ERECTILE DYSFUNCTION TYPE: ICD-10-CM

## 2022-10-04 DIAGNOSIS — I67.1 CEREBRAL ANEURYSM, NONRUPTURED: ICD-10-CM

## 2022-10-04 PROCEDURE — 90682 RIV4 VACC RECOMBINANT DNA IM: CPT | Performed by: STUDENT IN AN ORGANIZED HEALTH CARE EDUCATION/TRAINING PROGRAM

## 2022-10-04 PROCEDURE — 91312 COVID-19,PF,PFIZER BOOSTER BIVALENT: CPT | Performed by: STUDENT IN AN ORGANIZED HEALTH CARE EDUCATION/TRAINING PROGRAM

## 2022-10-04 PROCEDURE — 99214 OFFICE O/P EST MOD 30 MIN: CPT | Mod: 25 | Performed by: STUDENT IN AN ORGANIZED HEALTH CARE EDUCATION/TRAINING PROGRAM

## 2022-10-04 PROCEDURE — 90471 IMMUNIZATION ADMIN: CPT | Performed by: STUDENT IN AN ORGANIZED HEALTH CARE EDUCATION/TRAINING PROGRAM

## 2022-10-04 PROCEDURE — 0124A COVID-19,PF,PFIZER BOOSTER BIVALENT: CPT | Performed by: STUDENT IN AN ORGANIZED HEALTH CARE EDUCATION/TRAINING PROGRAM

## 2022-10-04 RX ORDER — LISINOPRIL 10 MG/1
20 TABLET ORAL DAILY
Qty: 180 TABLET | Refills: 1 | Status: SHIPPED | OUTPATIENT
Start: 2022-10-04 | End: 2023-04-03

## 2022-10-04 RX ORDER — SILDENAFIL CITRATE 20 MG/1
20 TABLET ORAL PRN
Qty: 30 TABLET | Refills: 11 | Status: SHIPPED | OUTPATIENT
Start: 2022-10-04 | End: 2023-10-04

## 2022-10-04 NOTE — PROGRESS NOTES
Assessment & Plan     Benign essential hypertension  Morbid obesity (H)  Discontinue amlodipine and increase lisinopril today. Continue to monitor blood pressure at home and follow up in about 1 month. Continue working on increasing physical activity and weight loss to help with blood pressure as well.   - lisinopril (ZESTRIL) 10 MG tablet  Dispense: 180 tablet; Refill: 1  - Adult Cardiology Eval  Referral    Bradycardia  Coronary artery disease involving native coronary artery of native heart without angina pectoris  Longstanding history of bradycardia with HR often in 50s and history of coronary artery disease. CT angiogram in Sept 2018 noted high plaque burden and moderate stenosis of proximal to mid LAD. Further evaluation with invasive angiogram recommended at that time, however per patient report he was told by another cardiologist that he should take statin and otherwise would not need further follow up if not developing new chest pain or other symptoms. Per chart review and notes from cardiology visit Nov 2018 it was decided at that time that invasive coronary angiography with goal of revascularization would be limited unless new symptoms develop. At that time plan was to continue ASA 81 mg daily and atorvastatin 40 mg daily with follow up in 3 years (which would have been Nov 2021). At this time follow up with cardiology recommended to revisit this issue and would also recommend further assessment of bradycardia at that time. Patient agreeable with plan so referral placed today.   - Adult Cardiology Eval  Referral    Need for prophylactic vaccination and inoculation against influenza  - INFLUENZA QUAD, RECOMBINANT, P-FREE (RIV4) (FLUBLOK)    High priority for 2019-nCoV vaccine  - COVID-19,PF,PFIZER BOOSTER BIVALENT 12+Yrs    Vasculogenic erectile dysfunction, unspecified vasculogenic erectile dysfunction type  Using a few times a week. No appreciable side effects.   - sildenafil  (REVATIO) 20 MG tablet  Dispense: 30 tablet; Refill: 11    Cerebral aneurysm, nonruptured  Stable but likely due for repeat MRI. Discussed and patient plans to reach out to neurology about imaging order but will call if new referral needed or if delay in scheduling with neurology and would like imaging completed before visit. Discussed I would be happy to order this but would likely defer interpretation and management to neurology which patient is agreeable with. Patient aware of importance of good blood pressure management on aneurysm stability.         Return in about 4 weeks (around 11/1/2022).    Diane Carballo MD  [facility] KRUPA Medina is a 52 year old presenting for the following health issues:  Follow Up (Patient following up on medications and physical. ), Mole (Wants to follow up on moles on his back ), Intracranial Aneurysm (Wants to discuss stable aneurysm.), Imm/Inj (Flu Shot), and Imm/Inj (COVID-19 VACCINE)      HPI   Medication trial since last visit (would stop a singular medication for ~3 days, note symptoms, restart all medications for several days, then stop another, repeat):   Amlodipine: /90 swelling and SOB much better when off   Lisinopril: when off 150/95, felt somewhat better but swelling still an issue  Atorvastatin: felt just a little better  ASA: no difference     Stable aneurysm, was told by neurology to have screened annually (MRI). He will follow up with neurology and if unable to get connected will come back to me and I will order.     HR: normally in 50s, when working out gets up to 120 and ok with this, 140s very difficult to catch his breath.     Breathing much better without amlodipine.     Weighs himself daily. Working a lot of different shift and eating more fast food lately. Working on increasing physical activity and planning to stopping fast food completely between now and     Review of Systems   ROS otherwise negative if not stated  "in HPI        Objective    /83   Pulse 51   Temp 97.9  F (36.6  C) (Oral)   Resp 16   Ht 1.905 m (6' 3\")   Wt 131.5 kg (289 lb 12.8 oz)   SpO2 99%   BMI 36.22 kg/m    Body mass index is 36.22 kg/m .  Physical Exam   GENERAL: pleasant, alert and no distress  EYES: Eyes grossly normal to inspection, conjunctivae and sclerae normal  NECK: no adenopathy, no asymmetry, masses, or scars and thyroid normal to palpation  RESP: lungs clear to auscultation - no rales, rhonchi or wheezes  CV: slower rate with regular rhythm, normal S1 S2, no S3 or S4, no murmur, click or rub, no peripheral edema and peripheral pulses strong  MS: no gross musculoskeletal defects noted, no edema    No results found for any visits on 10/04/22.            "

## 2022-10-11 DIAGNOSIS — E78.49 OTHER HYPERLIPIDEMIA: ICD-10-CM

## 2022-10-11 RX ORDER — ATORVASTATIN CALCIUM 40 MG/1
40 TABLET, FILM COATED ORAL EVERY EVENING
Qty: 90 TABLET | Refills: 0 | Status: SHIPPED | OUTPATIENT
Start: 2022-10-11 | End: 2023-01-10

## 2022-10-11 NOTE — TELEPHONE ENCOUNTER

## 2022-10-18 NOTE — PATIENT INSTRUCTIONS
Patient Education   Here is the plan from today's visit    Benign essential hypertension  Morbid obesity (H)  Discontinue amlodipine and increase lisinopril today. Continue to monitor blood pressure at home and follow up in about 1 month. Continue working on increasing physical activity and weight loss to help with blood pressure as well.   - lisinopril (ZESTRIL) 10 MG tablet  Dispense: 180 tablet; Refill: 1  - Adult Cardiology Eval  Referral     Bradycardia  Coronary artery disease involving native coronary artery of native heart without angina pectoris  Longstanding history of bradycardia with HR often in 50s and history of coronary artery disease. CT angiogram in Sept 2018 noted high plaque burden and moderate stenosis of proximal to mid LAD. Further evaluation with invasive angiogram recommended at that time, however per your recollection I reviewed the note from cardiology visit Nov 2018 and you were correct that it was decided at that time that invasive coronary angiography with goal of revascularization would be limited unless new symptoms developed. At that time plan was to continue ASA 81 mg daily and atorvastatin 40 mg daily with follow up in 3 years (which would have been Nov 2021). Since we are past that point I still think it would be good to touch base with cardiology with a visit and see if any changes in recommendations. You can also discuss your slower than normal heart rate with them at that visit and ensure nothing further needed at this time.  - Adult Cardiology Eval  Referral     Need for prophylactic vaccination and inoculation against influenza  - INFLUENZA QUAD, RECOMBINANT, P-FREE (RIV4) (FLUBLOK)     High priority for 2019-nCoV vaccine  - COVID-19,PF,PFIZER BOOSTER BIVALENT 12+Yrs     Vasculogenic erectile dysfunction, unspecified vasculogenic erectile dysfunction type  Using a few times a week. No appreciable side effects. Continue without change.   - sildenafil (REVATIO)  20 MG tablet  Dispense: 30 tablet; Refill: 11     Cerebral aneurysm, nonruptured  Stable but likely due for repeat MRI. Contact your neurology team and if there are issues getting MRI with them or if would like before visit I am happy to order this, I would simply defer management to your neurology team.           Please call or return to clinic if your symptoms don't go away.    Follow up plan  Return in about 4 weeks (around 11/1/2022).    Thank you for coming to [facility] today.  Lab Testing:  **If you had lab testing today and your results are reassuring or normal they will be mailed to you or sent through [employer] within 7 days.   **If the lab tests need quick action we will call you with the results.  **If you are having labs done on a different day, please call [phone] to schedule at [facility] or [phone] for other [facility] Outpatient Lab locations. Labs do not offer walk-in appointments.  The phone number we will call with results is # [phone] (home) [phone] (work). If this is not the best number please call our clinic and change the number.  Medication Refills:  If you need any refills please call your pharmacy and they will contact us.   If you need to  your refill at a new pharmacy, please contact the new pharmacy directly. The new pharmacy will help you get your medications transferred faster.   Scheduling:  If you have any concerns about today's visit or wish to schedule another appointment please call our office during normal business hours [phone] (8-5:00 M-F)  If a referral was made to an [facility] specialty provider and you do not get a call from central scheduling, please refer to directions on your visit summary or call our office during normal business hours for assistance.   If a Mammogram was ordered for you at the [facility] call [phone] to schedule or change your appointment.  If you had an XRay/CT/Ultrasound/MRI ordered the number  is [phone] to schedule or change your radiology appointment.   [facility] has limited ultrasound appointments available on Wednesdays, if you would like your ultrasound at [facility], please call [phone] to schedule.   Medical Concerns:  If you have urgent medical concerns please call [phone] at any time of the day.    Diane Carballo MD

## 2022-11-17 ENCOUNTER — OFFICE VISIT (OUTPATIENT)
Dept: FAMILY MEDICINE | Facility: CLINIC | Age: 52
End: 2022-11-17
Payer: COMMERCIAL

## 2022-11-17 VITALS
BODY MASS INDEX: 35.81 KG/M2 | OXYGEN SATURATION: 98 % | SYSTOLIC BLOOD PRESSURE: 125 MMHG | HEIGHT: 75 IN | HEART RATE: 54 BPM | DIASTOLIC BLOOD PRESSURE: 80 MMHG | TEMPERATURE: 98.1 F | RESPIRATION RATE: 16 BRPM | WEIGHT: 288 LBS

## 2022-11-17 DIAGNOSIS — Z80.8 FAMILY HISTORY OF MALIGNANT MELANOMA: ICD-10-CM

## 2022-11-17 DIAGNOSIS — E66.01 MORBID OBESITY (H): ICD-10-CM

## 2022-11-17 DIAGNOSIS — I10 BENIGN ESSENTIAL HYPERTENSION: Primary | ICD-10-CM

## 2022-11-17 PROCEDURE — 99213 OFFICE O/P EST LOW 20 MIN: CPT | Mod: GC | Performed by: STUDENT IN AN ORGANIZED HEALTH CARE EDUCATION/TRAINING PROGRAM

## 2022-11-17 ASSESSMENT — PATIENT HEALTH QUESTIONNAIRE - PHQ9: SUM OF ALL RESPONSES TO PHQ QUESTIONS 1-9: 5

## 2022-11-17 NOTE — PATIENT INSTRUCTIONS
Patient Education   Here is the plan from today's visit    1. Benign essential hypertension  Continue with current plan. Monitor cough and let me know if it becomes intolerable or new side effects arise.     2. Morbid obesity (H)  Continue working on weight loss. Great work stopping eating fast food. Plan to start working out and follow up with me in 3 months to check on progress.     3. Family history of malignant melanoma  New referral placed today.   - Adult Dermatology Referral; Future        Please call or return to clinic if your symptoms don't go away.    Follow up plan  No follow-ups on file.    Thank you for coming to [facility] today.  Lab Testing:  **If you had lab testing today and your results are reassuring or normal they will be mailed to you or sent through [employer] within 7 days.   **If the lab tests need quick action we will call you with the results.  **If you are having labs done on a different day, please call [phone] to schedule at [facility] or [phone] for other [facility] Outpatient Lab locations. Labs do not offer walk-in appointments.  The phone number we will call with results is # [phone] (home) [phone] (work). If this is not the best number please call our clinic and change the number.  Medication Refills:  If you need any refills please call your pharmacy and they will contact us.   If you need to  your refill at a new pharmacy, please contact the new pharmacy directly. The new pharmacy will help you get your medications transferred faster.   Scheduling:  If you have any concerns about today's visit or wish to schedule another appointment please call our office during normal business hours [phone] (8-5:00 M-F)  If a referral was made to an [facility] specialty provider and you do not get a call from central scheduling, please refer to directions on your visit summary or call our office during normal business hours for assistance.   If a  Mammogram was ordered for you at the [facility] call [phone] to schedule or change your appointment.  If you had an XRay/CT/Ultrasound/MRI ordered the number is [phone] to schedule or change your radiology appointment.   [facility] has limited ultrasound appointments available on Wednesdays, if you would like your ultrasound at [facility], please call [phone] to schedule.   Medical Concerns:  If you have urgent medical concerns please call [phone] at any time of the day.    Diane Carballo MD

## 2022-11-17 NOTE — PROGRESS NOTES
Preceptor Attestation:    I discussed the patient with the resident and evaluated the patient in person. I have verified the content of the note, which accurately reflects my assessment of the patient and the plan of care.   Supervising Physician:  Louis Samuels MD, MD.

## 2022-11-17 NOTE — PROGRESS NOTES
"  Assessment & Plan     Benign essential hypertension  Blood pressure improving after adjustments made to lisinopril last month. Currently taking 10 mg daily. Reporting new dry cough since increasing dose but finds this currently tolerable. No LE edema. Consider switching to ARB if cough worsens/becomes intolerable or other side effects arise.    Morbid obesity (H)  Working on weight loss. Has stopped eating fast food. Not currently interested in working with comprehensive weight management clinic but will consider if current plan of decreased fast food and increasing physical activity not producing adequate results. Has not yet started working out and has goal of 5 lbs weight loss/month. Follow up in 3 months.      Family history of malignant melanoma  History of malignant melanoma on both maternal and paternal side. Previously seen by dermatology in distant past but has not been seen recently and would like skin check. Many nevi present of various appearances across trunk. Referral placed for dermatology.   - Adult Dermatology Referral      Return in about 3 months (around 2/17/2023).    Diane Carballo MD  [facility] KRUPA Medina is a 52 year old, presenting for the following health issues:  Follow Up (Following up on BP. Patient states he has had a dry cough and some light headedness since starting the Lisinopril and Atorvastatin ), Weight Problem (Patient wants to discuss weight), and Referral (Dermatology referral for moles on back )      HPI     BP check and weight check.   Home bp checks more within goal range 130s/80s, sometimes 120s.     Has lost a few lbs, mainly because he has stopped all fast foods. Has not been working out and this is a goal. Previously discussed weight management clinic, wife just started and had first consultation. \"cautiously optimistic\" and was started on some type of stimulant. Has noticed a dry cough since doubling dose. Much less than 1/2 the " "time. So thinks this is tolerable for now. Will consider ARB in future if not tolerable. No LE swelling.     Refers to himself as an \"addict\" but hasn't used drugs or tobacco in 29 years. Finds nicotine was the most difficult but also finding this current issue even more difficult- discussed that this is different because we need to eat, can't quit food.     Mood still low, not \"clinically depressed levels, take pleasure in a lot of things but I'm in a funk.\" Wants to start exercising. Goal of 5 lbs a month lost. Will work on this for the next 3 months and then check in. If not making some strides, will reconsider weight management clinic.     Has cardiology appointment in January.   Has not reached out to neurology but plans to do so.     Kids 23, 20 and 13 (two marriages, first two from first marriage).     PHq9 tday to track mood. Score 5, and makes every day tasks somewhat difficult. Will track this over the next 3-6 months.      Review of Systems   ROS otherwise negative if not stated in HPI        Objective    /80   Pulse 54   Temp 98.1  F (36.7  C) (Oral)   Resp 16   Ht 1.905 m (6' 3\")   Wt 130.6 kg (288 lb)   SpO2 98%   BMI 36.00 kg/m    Body mass index is 36 kg/m .  Physical Exam   GENERAL: healthy, alert and no distress  EYES: Eyes grossly normal to inspection, PERRL and conjunctivae and sclerae normal  NECK: no adenopathy, no asymmetry, masses, or scars and thyroid normal to palpation  RESP: lungs clear to auscultation - no rales, rhonchi or wheezes  CV: bradycardia, normal S1 S2, no S3 or S4, no murmur, click or rub, heart sounds distant, best appreciated in tricuspid region, peripheral pulses strong, no peripheral edema  MS: no gross musculoskeletal defects noted, no edema  SKIN: many nevi of various size/color/appearance across back/trunk.  PSYCH: mentation appears normal, affect normal/bright    No results found for any visits on 11/17/22.          "

## 2023-01-09 ENCOUNTER — OFFICE VISIT (OUTPATIENT)
Dept: CARDIOLOGY | Facility: CLINIC | Age: 53
End: 2023-01-09
Attending: INTERNAL MEDICINE
Payer: COMMERCIAL

## 2023-01-09 VITALS
OXYGEN SATURATION: 99 % | WEIGHT: 291.5 LBS | HEART RATE: 52 BPM | HEIGHT: 74 IN | DIASTOLIC BLOOD PRESSURE: 90 MMHG | BODY MASS INDEX: 37.41 KG/M2 | SYSTOLIC BLOOD PRESSURE: 152 MMHG

## 2023-01-09 DIAGNOSIS — R00.1 BRADYCARDIA: ICD-10-CM

## 2023-01-09 DIAGNOSIS — I10 BENIGN ESSENTIAL HYPERTENSION: ICD-10-CM

## 2023-01-09 DIAGNOSIS — I25.10 CORONARY ARTERY DISEASE INVOLVING NATIVE CORONARY ARTERY OF NATIVE HEART WITHOUT ANGINA PECTORIS: ICD-10-CM

## 2023-01-09 PROCEDURE — G0463 HOSPITAL OUTPT CLINIC VISIT: HCPCS | Mod: 25 | Performed by: INTERNAL MEDICINE

## 2023-01-09 PROCEDURE — G0463 HOSPITAL OUTPT CLINIC VISIT: HCPCS | Mod: 25

## 2023-01-09 PROCEDURE — 93005 ELECTROCARDIOGRAM TRACING: CPT

## 2023-01-09 PROCEDURE — 99204 OFFICE O/P NEW MOD 45 MIN: CPT | Mod: GC | Performed by: INTERNAL MEDICINE

## 2023-01-09 RX ORDER — ASPIRIN 81 MG/1
243 TABLET, CHEWABLE ORAL ONCE
Status: CANCELLED | OUTPATIENT
Start: 2023-01-09

## 2023-01-09 RX ORDER — SODIUM CHLORIDE 9 MG/ML
INJECTION, SOLUTION INTRAVENOUS CONTINUOUS
Status: CANCELLED | OUTPATIENT
Start: 2023-01-09

## 2023-01-09 RX ORDER — POTASSIUM CHLORIDE 1500 MG/1
20 TABLET, EXTENDED RELEASE ORAL
Status: CANCELLED | OUTPATIENT
Start: 2023-01-09

## 2023-01-09 RX ORDER — ASPIRIN 325 MG
325 TABLET ORAL ONCE
Status: CANCELLED | OUTPATIENT
Start: 2023-01-09 | End: 2023-01-09

## 2023-01-09 RX ORDER — POTASSIUM CHLORIDE 1500 MG/1
40 TABLET, EXTENDED RELEASE ORAL
Status: CANCELLED | OUTPATIENT
Start: 2023-01-09

## 2023-01-09 ASSESSMENT — PAIN SCALES - GENERAL: PAINLEVEL: NO PAIN (0)

## 2023-01-09 NOTE — NURSING NOTE
Chief Complaint   Patient presents with     New Patient     referral from Leonor Deleon MD for hypertension         Vitals were taken, medications reconciled and EKG performed.     Suresh Cisneros, EMT   8:29 AM

## 2023-01-09 NOTE — Clinical Note
1/9/2023      RE: Lui Arrington  [address] [place] [address]       Dear Colleague,    Thank you for the opportunity to participate in the care of your patient, Lui Arrington, at the [facility]. Please see a copy of my visit note below.    No notes on file    Please do not hesitate to contact me if you have any questions/concerns.     Sincerely,     Fracisco Morales MD

## 2023-01-09 NOTE — LETTER
1/9/2023       RE: Lui Arrington  [address] [place] [address]     Dear Colleague,    Thank you for referring your patient, Lui Arrington, to the [facility] [place] [facility]. Please see a copy of my visit note below.    [facility]  CARDIOVASCULAR MEDICINE CLINIC NOTE    Referring Provider: Leonor Deleon   Primary Care Provider: Richy Dorsey     Patient Name: Lui Arrington   MRN: [id number]     PERTINENT CLINICAL HISTORY:   Lui Arrington is a 52 year old male  with pmh notable for CAD, and small brain aneurysm who was recently admitted to [facility] for paresthesias found to have carpal tunnel syndrome. During hospitalization patient had an episode chest pain for which he ended up having an exercise stress echo and a coronary CT angiogram. The functional test did not reveal any ischemia but the CTA showed moderate-severe disease of the LAD.      Mr. Arrington reports exercising several days per week ranging from cross fit work outs to walking for 30min. He usually get his HR to 140 bpm with intense exercise. Denies any chest pain with activity though he feels winded with more intense exercise. No dyspnea or chest pain at rest. He reports no currently life limitations. Denies any COLON, leg swelling, PND, orthopnea, palpitations or syncope. He does report occasional dizzyness with changes in position.        PAST MEDICAL HISTORY:     Past Medical History:   Diagnosis Date     Brain aneurysm     Small     Coronary artery disease      Diverticulosis      Seasonal allergies      Substance dependence, in remission (H) 1993        PAST SURGICAL HISTORY:     Past Surgical History:   Procedure Laterality Date     COLONOSCOPY  8/4/2014    Procedure: COMBINED COLONOSCOPY, SINGLE BIOPSY/POLYPECTOMY BY BIOPSY;  Surgeon: Pb Celaya MD;  Location:  GI     TURBINATE REDUCTION Bilateral 8/16/2021     Procedure: REDUCTION, BILATERAL INFERIOR NASAL TURBINATE;  Surgeon: Phoenix Garcia MD;  Location: [facility] OR     VASECTOMY          CURRENT MEDICATIONS:     Current Outpatient Medications   Medication Sig Dispense Refill     aspirin 81 MG EC tablet Take 1 tablet (81 mg) by mouth daily 90 tablet 3     atorvastatin (LIPITOR) 40 MG tablet Take 1 tablet (40 mg) by mouth every evening Time for a follow up visit with labs! 90 tablet 0     lisinopril (ZESTRIL) 10 MG tablet Take 2 tablets (20 mg) by mouth daily 180 tablet 1     oxymetazoline (AFRIN) 0.05 % nasal spray Spray 2 sprays into both nostrils daily as needed for other (bleeding from the nose) 22 mL 0     sildenafil (REVATIO) 20 MG tablet Take 1 tablet (20 mg) by mouth as needed (ED) 30 tablet 11     sodium chloride (OCEAN) 0.65 % nasal spray Spray 2 sprays in nostril every 4 hours 88 mL 0     Vitamin D, Cholecalciferol, 25 MCG (1000 UT) CAPS  (Patient not taking: Reported on 2022)          ALLERGIES:     Allergies   Allergen Reactions     Ciprofloxacin Hives     Erythromycin      Seasonal Allergies         FAMILY HISTORY:     Family History   Problem Relation Age of Onset     Cancer Mother      Cancer - colorectal Mother      Hypertension Mother      Crohn's Disease Mother      Cancer Father      Diabetes Maternal Grandmother      C.A.D. Maternal Grandmother      C.A.D. Paternal Grandfather      Hypertension Brother      Kidney Cancer Brother         SOCIAL HISTORY:     Social History     Socioeconomic History     Marital status:      Spouse name: None     Number of children: None     Years of education: None     Highest education level: None   Tobacco Use     Smoking status: Former     Types: Cigarettes     Quit date: 1998     Years since quittin.0     Smokeless tobacco: Never   Vaping Use     Vaping Use: Never used   Substance and Sexual Activity     Alcohol use: No     Drug use: No     Comment: in remission , marijuana, ETOH, narcotic (no  "IV use)     Sexual activity: Yes     Partners: Female   Social History Narrative    Self-employed, ethics and leadership education. , has 6 yo son plus two children from previous marriage. Non-smoker. No alcohol use (abstains due to remote hx of alcoholism), no other drug use.      Lui  reports no history of alcohol use. and  reports that he quit smoking about 25 years ago. His smoking use included cigarettes. He has never used smokeless tobacco..     REVIEW OF SYSTEMS:   A comprehensive review of systems was performed and negative unless otherwise noted in the HPI above.      PHYSICAL EXAMINATION:   BP (!) 152/90 (BP Location: Right arm, Patient Position: Sitting, Cuff Size: Adult Large)   Pulse 52   Ht 1.886 m (6' 2.25\")   Wt 132.2 kg (291 lb 8 oz)   SpO2 99%   BMI 37.17 kg/m    Body mass index is 37.17 kg/m .  Wt Readings from Last 2 Encounters:   01/09/23 132.2 kg (291 lb 8 oz)   11/17/22 130.6 kg (288 lb)     Constitutional: no acute distress, pleasant and cooperative, appears overall well.  Eyes:sclera white, conjunctiva clear, without icterus or pallor   Ears/Nose/Mouth/Throat: Pinna, tragus, and external canal non-tender are normal, Nares patent b/l, moist mucous membranes  Cardiovascular: RRR nl S1S2, JVP not elevated, extremities with no edema or cyanosis  Respiratory: clear to auscultation and percussion bilaterally anterior and posterior  Gastrointestinal: soft, nontender, non distended, no hepatosplenomegaly or masses  Musculoskeletal: normal muscle bulk and tone, joints   Skin: normal skin appearance without worrisome lesions.   Neurologic: Alert and oriented, face symmetric, normal gait  Psychiatric: appropriate affect, eye contact, intact thought and speech       LABORATORY DATA:     LIPID RESULTS:  Recent Labs   Lab Test 08/11/22  1508 12/11/19  1402 08/24/18  1020   CHOL 105 98.9 141.9   HDL 42 38.4* 42.0   LDL 34 30 78   TRIG 144 153.4* 110.6   CHOLHDLRATIO  --  2.6 3.4    "     LIVER ENZYME RESULTS:  Recent Labs   Lab Test 09/03/18  1341 08/24/18  1020   AST 20 11.7   ALT 29 19.5       CBC RESULTS:  Recent Labs   Lab Test 08/11/22  1639 09/03/18  1341   WBC 7.5 6.6   HGB 14.1 15.2   HCT 44.8 45.6    149*       BMP RESULTS:  Recent Labs   Lab Test 08/11/22  1508 12/11/19  1402 09/03/18  1341    140.4 139   POTASSIUM 5.2 4.2 4.0   CHLORIDE 105 99.4 106   CO2 26 27.3 27   ANIONGAP 9  --  6   GLC 99 110.0* 93   BUN 16.2 14.4 15   CR 1.03 0.9 0.86   MARIO 9.1 9.7 8.8       A1C RESULTS:  Lab Results   Component Value Date    A1C 5.0 08/24/2018       INR RESULTS:  Recent Labs   Lab Test 09/03/18  1341   INR 0.92          PROCEDURES & FURTHER ASSESSMENTS:     EKG: ***    ECHO: ***    STRESS TEST:  ***   9/4/2018  Interpretation Summary  Near maximal stress test, achieved 81% of the age predicted maximal heart  rate. Limiting symptom fatigue.     Hypertensive blood pressure response to exercise.  No angina symptoms with exercise.  1 mm ST segment depression in the inferior and lateral leads that normalize to  baseline 2 minutes into recovery.  Normal segmental and global LV function with EF of approximately 55-60% at  rest; with exercise left ventricular ventricular cavity size decreases and  LVEF increases to 65-70%. No stress induced regional wall motion  abnormalities.  Average functional capacity for age.  Normal aortic root and no significant valvular dysfunction noted on screening  2D and Doppler examination.        CARDIAC CATH:  ***   CT Angiogram                                                              IMPRESSION:  1.  High plaque burden with serial stenosis in the proximal to mid LAD  that are moderate-severe in severity. Recommend further evaluation  with invasive angiogram. Results conveyed to team. Minimal disease in  the RCA and circumflex.  2.  Total Agatston score 189 placing the patient in the 95th  percentile when compared to age and gender matched control  group.  3.  Please review Radiology report for incidental noncardiac findings  that will follow separately.     CABG/ Cardiac surgery:***       CLINICAL IMPRESSION:     Lui Arrington is a 52 year old male ***        PLAN:        Follow-up: ***    Thank you for allowing us to take part in the care of this very pleasant patient.  Please do not hesitate to call if any further questions or concerns arise.    Patient seen and discussed with Dr. Morales.     Glenny Dumont MD, PhD  Cardiology Fellow    January 9, 2023      CC  Patient Care Team:  Richy Dorsey MD as PCP - General (Family Practice)  Josias Mishra MD as MD (Plastic Surgery)  Fracisco Morales MD as MD (Cardiology)  Richy Dorsey MD as Referring Physician (Family Medicine)  Phoenix Garcia MD as MD (Otolaryngology)  Gela Blank AuD as Audiologist (Audiology)  Nissen, Rick L, MD as MD (Otolaryngology)  Phoenix Garcia MD as Assigned Surgical Provider  Richy Dorsey MD as Assigned PCP  Fracisco Morales MD as MD (Cardiovascular Disease)  Celeste Fontaine MD as MD (Dermatology)  ARMANDO COSTA      Again, thank you for allowing me to participate in the care of your patient.      Sincerely,    Fracisco Morales MD

## 2023-01-09 NOTE — PROGRESS NOTES
[facility]  CARDIOVASCULAR MEDICINE CLINIC NOTE    Referring Provider: Leonor Deleon   Primary Care Provider: Richy Dorsey     Patient Name: Lui Arrington   MRN: [id number]     PERTINENT CLINICAL HISTORY:   Lui Arrington is a 52 year old male  with pmh notable for CAD, and small brain aneurysm who was recently admitted to [facility] for paresthesias found to have carpal tunnel syndrome. During hospitalization patient had an episode chest pain for which he ended up having an exercise stress echo and a coronary CT angiogram. The functional test did not reveal any ischemia but the CTA showed moderate-severe disease of the LAD.     He was last seen in clinic on 11/05/2018 roughly 2 months after his CT coronary angiograms. He was asymptomatic and chest pain free at the time with good exercise tolerance. Given his lack of symptoms a collaborative decision was made to not pursue further invasive studies at the time with patient requested to return should he develop exertional chest pain.     In clinic today (1/9/2023), patient reports that he has been doing well in the past 4 yrs. He has remained active though he has had some weight gain in the past few years (277 lbs --> 291 lbs) as his diet could be better. In recent months, he had noticed some exertional chest pain/discomfort that occurs only while he is riding his stationery bike. The chest pain lasts a few seconds to a few minutes and typically goes away with burping/rest. He is able to walk and slow jog on the treadmill without chest pain. He denies any  leg swelling, PND, orthopnea, palpitations or syncope. He does report occasional dizziness with changes in position.        PAST MEDICAL HISTORY:     Past Medical History:   Diagnosis Date     Brain aneurysm     Small     Coronary artery disease      Diverticulosis      Seasonal allergies      Substance dependence, in remission (H) 1993        PAST SURGICAL HISTORY:     Past Surgical  History:   Procedure Laterality Date     COLONOSCOPY  8/4/2014    Procedure: COMBINED COLONOSCOPY, SINGLE BIOPSY/POLYPECTOMY BY BIOPSY;  Surgeon: Pb Celaya MD;  Location: [facility]     TURBINATE REDUCTION Bilateral 8/16/2021    Procedure: REDUCTION, BILATERAL INFERIOR NASAL TURBINATE;  Surgeon: Phoenix Garcia MD;  Location: [facility] OR     VASECTOMY          CURRENT MEDICATIONS:     Current Outpatient Medications   Medication Sig Dispense Refill     aspirin 81 MG EC tablet Take 1 tablet (81 mg) by mouth daily 90 tablet 3     atorvastatin (LIPITOR) 40 MG tablet Take 1 tablet (40 mg) by mouth every evening Time for a follow up visit with labs! 90 tablet 0     lisinopril (ZESTRIL) 10 MG tablet Take 2 tablets (20 mg) by mouth daily 180 tablet 1     oxymetazoline (AFRIN) 0.05 % nasal spray Spray 2 sprays into both nostrils daily as needed for other (bleeding from the nose) 22 mL 0     sildenafil (REVATIO) 20 MG tablet Take 1 tablet (20 mg) by mouth as needed (ED) 30 tablet 11     sodium chloride (OCEAN) 0.65 % nasal spray Spray 2 sprays in nostril every 4 hours 88 mL 0     Vitamin D, Cholecalciferol, 25 MCG (1000 UT) CAPS  (Patient not taking: Reported on 11/17/2022)          ALLERGIES:     Allergies   Allergen Reactions     Ciprofloxacin Hives     Erythromycin      Seasonal Allergies         FAMILY HISTORY:     Family History   Problem Relation Age of Onset     Cancer Mother      Cancer - colorectal Mother      Hypertension Mother      Crohn's Disease Mother      Cancer Father      Diabetes Maternal Grandmother      C.A.D. Maternal Grandmother      C.A.D. Paternal Grandfather      Hypertension Brother      Kidney Cancer Brother         SOCIAL HISTORY:     Social History     Socioeconomic History     Marital status:      Spouse name: None     Number of children: None     Years of education: None     Highest education level: None   Tobacco Use     Smoking status: Former     Types: Cigarettes     Quit date:  "1998     Years since quittin.0     Smokeless tobacco: Never   Vaping Use     Vaping Use: Never used   Substance and Sexual Activity     Alcohol use: No     Drug use: No     Comment: in remission , marijuana, ETOH, narcotic (no IV use)     Sexual activity: Yes     Partners: Female   Social History Narrative    Self-employed, ethics and leadership education. , has 6 yo son plus two children from previous marriage. Non-smoker. No alcohol use (abstains due to remote hx of alcoholism), no other drug use.      Lui  reports no history of alcohol use. and  reports that he quit smoking about 25 years ago. His smoking use included cigarettes. He has never used smokeless tobacco..     REVIEW OF SYSTEMS:   A comprehensive review of systems was performed and negative unless otherwise noted in the HPI above.      PHYSICAL EXAMINATION:   BP (!) 152/90 (BP Location: Right arm, Patient Position: Sitting, Cuff Size: Adult Large)   Pulse 52   Ht 1.886 m (6' 2.25\")   Wt 132.2 kg (291 lb 8 oz)   SpO2 99%   BMI 37.17 kg/m    Body mass index is 37.17 kg/m .     Manual cuff repeat BP: 128/60    Wt Readings from Last 2 Encounters:   23 132.2 kg (291 lb 8 oz)   22 130.6 kg (288 lb)     Constitutional: no acute distress, pleasant and cooperative, appears overall well.  Eyes:sclera white, conjunctiva clear, without icterus or pallor   Ears/Nose/Mouth/Throat: Pinna, tragus, and external canal non-tender are normal, Nares patent b/l, moist mucous membranes  Cardiovascular: distant heart sounds 2/2 body habitus with bradycardic rate, regular rhythm, nl S1S2, JVP not elevated, extremities with no edema or cyanosis  Respiratory: clear to auscultation and percussion bilaterally anterior and posterior  Gastrointestinal: obese abdomen, soft, nontender, non distended, no hepatosplenomegaly or masses  Musculoskeletal: normal muscle bulk, tone, able to move all extremities  Skin: normal skin appearance without " worrisome lesions.   Neurologic: Alert and oriented, face symmetric, normal gait  Psychiatric: appropriate affect, eye contact, intact thought and speech       LABORATORY DATA:     LIPID RESULTS:  Recent Labs   Lab Test 08/11/22  1508 12/11/19  1402 08/24/18  1020   CHOL 105 98.9 141.9   HDL 42 38.4* 42.0   LDL 34 30 78   TRIG 144 153.4* 110.6   CHOLHDLRATIO  --  2.6 3.4        LIVER ENZYME RESULTS:  Recent Labs   Lab Test 09/03/18  1341 08/24/18  1020   AST 20 11.7   ALT 29 19.5       CBC RESULTS:  Recent Labs   Lab Test 08/11/22  1639 09/03/18  1341   WBC 7.5 6.6   HGB 14.1 15.2   HCT 44.8 45.6    149*       BMP RESULTS:  Recent Labs   Lab Test 08/11/22  1508 12/11/19  1402 09/03/18  1341    140.4 139   POTASSIUM 5.2 4.2 4.0   CHLORIDE 105 99.4 106   CO2 26 27.3 27   ANIONGAP 9  --  6   GLC 99 110.0* 93   BUN 16.2 14.4 15   CR 1.03 0.9 0.86   MARIO 9.1 9.7 8.8       A1C RESULTS:  Lab Results   Component Value Date    A1C 5.0 08/24/2018       INR RESULTS:  Recent Labs   Lab Test 09/03/18  1341   INR 0.92          PROCEDURES & FURTHER ASSESSMENTS:     EKG: sinus bradycardia with a ventricular rate of 53 bpm    STRESS TEST:   Stress TTE (9/4/2018)  Interpretation Summary  Near maximal stress test, achieved 81% of the age predicted maximal heart  rate. Limiting symptom fatigue.   Hypertensive blood pressure response to exercise.  No angina symptoms with exercise.  1 mm ST segment depression in the inferior and lateral leads that normalize to  baseline 2 minutes into recovery.  Normal segmental and global LV function with EF of approximately 55-60% at  rest; with exercise left ventricular ventricular cavity size decreases and  LVEF increases to 65-70%. No stress induced regional wall motion  abnormalities.  Average functional capacity for age.  Normal aortic root and no significant valvular dysfunction noted on screening  2D and Doppler examination.    CARDIAC CATH:    CT Angiogram (9/5/18):                                                               IMPRESSION:  1.  High plaque burden with serial stenosis in the proximal to mid LAD  that are moderate-severe in severity. Recommend further evaluation  with invasive angiogram. Results conveyed to team. Minimal disease in  the RCA and circumflex.  2.  Total Agatston score 189 placing the patient in the 95th  percentile when compared to age and gender matched control group.  3.  Please review Radiology report for incidental noncardiac findings  that will follow separately.     CLINICAL IMPRESSION:   Lui Arrington is a 48 year old male HTN and biateralcarpal tunnel syndrome, and CAD with mod-severe disease on CTA in 9/2018 (asymptomatic at the time without acute indication for further invasive studies).      Patient was was seen in clinic today with a complaint of subacute exertional chest pain. Given his history of known significant CAD, it will be prudent to do a coronary angiogram for definitive evaluation and possible intervention.     We proposed doing a coronary angiogram as soon as possible but patient would like to wait on this until he returns from a planned trip to [place] ([facility] which he had coming up soon.     Additionally given patient's history of bilateral carpel tunnel syndrome, a cardiac MRI will be prudent to r/o amyloidosis and/or any underlying cardiomyopathy.     Finally, patient also reported of some history of cerebral anuerysm, we recommended he follow up with neurology ASAP to determine the status of his brain aneursyms and ensure that DAPT will not be contrindicated should he need one after his coronary angiogram.       PLAN:  -- Follow up with neurology  -- Coronary angiogram in 2-3 months (once he returns from [facility] vacation)  -- Cardiac MRI after coronary angiogram  -- Continue ASA 81mg daily and Atorvastatin 40mg daily    -- Continue Lisinopril 20 mg daily  -- Sublingual Nitroglycerin is not prescribed as patient takes Sildenafil  for ED      Follow-up: in 3 months (after his trip to [facility])      Thank you for allowing us to take part in the care of this very pleasant patient.  Please do not hesitate to call if any further questions or concerns arise.    Patient seen and discussed with Dr. Morales.     Glenny Dumont MD, PhD  Cardiology Fellow    January 9, 2023      ATTENDING ATTESTATION:   I personally examined and evaluated this patient on January 9, 2023.  I have personally reviewed today's vital signs, medications, all labs, and all imaging/cardiac studies described above. I have reviewed and edited, as necessary, the history, review of systems, physical examination, and assessment and plan.  I discussed the patient with Dr. Dumont and agree with the assessment and plan of care as documented in the note above.  I personally spent 50min today reviewing the medical record, meeting with the patient, and completing this note.  Thank you for allowing us to take part in the care of this very pleasant patient.  Please do not hesitate to call if any further questions or concerns arise.    Fracisco Morales MD, PhD  Interventional/Critical Care Cardiology  [phone]    January 9, 2023        CC  Patient Care Team:  Richy Dorsey MD as PCP - General (Family Practice)  Josias Mishra MD as MD (Plastic Surgery)  Fracisco Morales MD as MD (Cardiology)  Richy Dorsey MD as Referring Physician (Family Medicine)  Phoenix Garcia MD as MD (Otolaryngology)  Gela Blank AuD as Audiologist (Audiology)  Nissen, Rick L, MD as MD (Otolaryngology)  Phoenix Garcia MD as Assigned Surgical Provider  Richy Dorsey MD as Assigned PCP  Fracisco Morales MD as MD (Cardiovascular Disease)  Celeste Fontaine MD as MD (Dermatology)  ARMANDO COSTA

## 2023-01-09 NOTE — LETTER
1/9/2023      RE: Lui Arrington  [address] [place] [address]       [facility]  CARDIOVASCULAR MEDICINE CLINIC NOTE    Referring Provider: Leonor Deleon   Primary Care Provider: Richy Dorsey     Patient Name: Lui Arrington   MRN: [id number]     PERTINENT CLINICAL HISTORY:   Lui Arrington is a 52 year old male  with pmh notable for CAD, and small brain aneurysm who was recently admitted to [facility] for paresthesias found to have carpal tunnel syndrome. During hospitalization patient had an episode chest pain for which he ended up having an exercise stress echo and a coronary CT angiogram. The functional test did not reveal any ischemia but the CTA showed moderate-severe disease of the LAD.      Mr. Arrington reports exercising several days per week ranging from cross fit work outs to walking for 30min. He usually get his HR to 140 bpm with intense exercise. Denies any chest pain with activity though he feels winded with more intense exercise. No dyspnea or chest pain at rest. He reports no currently life limitations. Denies any COLON, leg swelling, PND, orthopnea, palpitations or syncope. He does report occasional dizzyness with changes in position.        PAST MEDICAL HISTORY:     Past Medical History:   Diagnosis Date     Brain aneurysm     Small     Coronary artery disease      Diverticulosis      Seasonal allergies      Substance dependence, in remission (H) 1993        PAST SURGICAL HISTORY:     Past Surgical History:   Procedure Laterality Date     COLONOSCOPY  8/4/2014    Procedure: COMBINED COLONOSCOPY, SINGLE BIOPSY/POLYPECTOMY BY BIOPSY;  Surgeon: Pb Celaya MD;  Location:  GI     TURBINATE REDUCTION Bilateral 8/16/2021    Procedure: REDUCTION, BILATERAL INFERIOR NASAL TURBINATE;  Surgeon: Phoenix Garcia MD;  Location: [facility] OR     VASECTOMY          CURRENT MEDICATIONS:     Current Outpatient Medications   Medication Sig Dispense Refill     aspirin 81  MG EC tablet Take 1 tablet (81 mg) by mouth daily 90 tablet 3     atorvastatin (LIPITOR) 40 MG tablet Take 1 tablet (40 mg) by mouth every evening Time for a follow up visit with labs! 90 tablet 0     lisinopril (ZESTRIL) 10 MG tablet Take 2 tablets (20 mg) by mouth daily 180 tablet 1     oxymetazoline (AFRIN) 0.05 % nasal spray Spray 2 sprays into both nostrils daily as needed for other (bleeding from the nose) 22 mL 0     sildenafil (REVATIO) 20 MG tablet Take 1 tablet (20 mg) by mouth as needed (ED) 30 tablet 11     sodium chloride (OCEAN) 0.65 % nasal spray Spray 2 sprays in nostril every 4 hours 88 mL 0     Vitamin D, Cholecalciferol, 25 MCG (1000 UT) CAPS  (Patient not taking: Reported on 2022)          ALLERGIES:     Allergies   Allergen Reactions     Ciprofloxacin Hives     Erythromycin      Seasonal Allergies         FAMILY HISTORY:     Family History   Problem Relation Age of Onset     Cancer Mother      Cancer - colorectal Mother      Hypertension Mother      Crohn's Disease Mother      Cancer Father      Diabetes Maternal Grandmother      C.A.D. Maternal Grandmother      C.A.D. Paternal Grandfather      Hypertension Brother      Kidney Cancer Brother         SOCIAL HISTORY:     Social History     Socioeconomic History     Marital status:      Spouse name: None     Number of children: None     Years of education: None     Highest education level: None   Tobacco Use     Smoking status: Former     Types: Cigarettes     Quit date: 1998     Years since quittin.0     Smokeless tobacco: Never   Vaping Use     Vaping Use: Never used   Substance and Sexual Activity     Alcohol use: No     Drug use: No     Comment: in remission , marijuana, ETOH, narcotic (no IV use)     Sexual activity: Yes     Partners: Female   Social History Narrative    Self-employed, ethics and leadership education. , has 6 yo son plus two children from previous marriage. Non-smoker. No alcohol use (abstains  "due to remote hx of alcoholism), no other drug use.      Lui  reports no history of alcohol use. and  reports that he quit smoking about 25 years ago. His smoking use included cigarettes. He has never used smokeless tobacco..     REVIEW OF SYSTEMS:   A comprehensive review of systems was performed and negative unless otherwise noted in the HPI above.      PHYSICAL EXAMINATION:   BP (!) 152/90 (BP Location: Right arm, Patient Position: Sitting, Cuff Size: Adult Large)   Pulse 52   Ht 1.886 m (6' 2.25\")   Wt 132.2 kg (291 lb 8 oz)   SpO2 99%   BMI 37.17 kg/m    Body mass index is 37.17 kg/m .  Wt Readings from Last 2 Encounters:   01/09/23 132.2 kg (291 lb 8 oz)   11/17/22 130.6 kg (288 lb)     Constitutional: no acute distress, pleasant and cooperative, appears overall well.  Eyes:sclera white, conjunctiva clear, without icterus or pallor   Ears/Nose/Mouth/Throat: Pinna, tragus, and external canal non-tender are normal, Nares patent b/l, moist mucous membranes  Cardiovascular: RRR nl S1S2, JVP not elevated, extremities with no edema or cyanosis  Respiratory: clear to auscultation and percussion bilaterally anterior and posterior  Gastrointestinal: soft, nontender, non distended, no hepatosplenomegaly or masses  Musculoskeletal: normal muscle bulk and tone, joints   Skin: normal skin appearance without worrisome lesions.   Neurologic: Alert and oriented, face symmetric, normal gait  Psychiatric: appropriate affect, eye contact, intact thought and speech       LABORATORY DATA:     LIPID RESULTS:  Recent Labs   Lab Test 08/11/22  1508 12/11/19  1402 08/24/18  1020   CHOL 105 98.9 141.9   HDL 42 38.4* 42.0   LDL 34 30 78   TRIG 144 153.4* 110.6   CHOLHDLRATIO  --  2.6 3.4        LIVER ENZYME RESULTS:  Recent Labs   Lab Test 09/03/18  1341 08/24/18  1020   AST 20 11.7   ALT 29 19.5       CBC RESULTS:  Recent Labs   Lab Test 08/11/22  1639 09/03/18  1341   WBC 7.5 6.6   HGB 14.1 15.2   HCT 44.8 45.6    149* "       BMP RESULTS:  Recent Labs   Lab Test 08/11/22  1508 12/11/19  1402 09/03/18  1341    140.4 139   POTASSIUM 5.2 4.2 4.0   CHLORIDE 105 99.4 106   CO2 26 27.3 27   ANIONGAP 9  --  6   GLC 99 110.0* 93   BUN 16.2 14.4 15   CR 1.03 0.9 0.86   MARIO 9.1 9.7 8.8       A1C RESULTS:  Lab Results   Component Value Date    A1C 5.0 08/24/2018       INR RESULTS:  Recent Labs   Lab Test 09/03/18  1341   INR 0.92          PROCEDURES & FURTHER ASSESSMENTS:     EKG: ***    ECHO: ***    STRESS TEST:  ***   9/4/2018  Interpretation Summary  Near maximal stress test, achieved 81% of the age predicted maximal heart  rate. Limiting symptom fatigue.     Hypertensive blood pressure response to exercise.  No angina symptoms with exercise.  1 mm ST segment depression in the inferior and lateral leads that normalize to  baseline 2 minutes into recovery.  Normal segmental and global LV function with EF of approximately 55-60% at  rest; with exercise left ventricular ventricular cavity size decreases and  LVEF increases to 65-70%. No stress induced regional wall motion  abnormalities.  Average functional capacity for age.  Normal aortic root and no significant valvular dysfunction noted on screening  2D and Doppler examination.        CARDIAC CATH:  ***   CT Angiogram                                                              IMPRESSION:  1.  High plaque burden with serial stenosis in the proximal to mid LAD  that are moderate-severe in severity. Recommend further evaluation  with invasive angiogram. Results conveyed to team. Minimal disease in  the RCA and circumflex.  2.  Total Agatston score 189 placing the patient in the 95th  percentile when compared to age and gender matched control group.  3.  Please review Radiology report for incidental noncardiac findings  that will follow separately.     CABG/ Cardiac surgery:***       CLINICAL IMPRESSION:     Lui Arrington is a 52 year old male ***        PLAN:        Follow-up:  ***    Thank you for allowing us to take part in the care of this very pleasant patient.  Please do not hesitate to call if any further questions or concerns arise.    Patient seen and discussed with Dr. Morales.     Glenny Dumont MD, PhD  Cardiology Fellow    January 9, 2023      CC  Patient Care Team:  Richy Dorsey MD as PCP - General (Family Practice)  Josias Mishra MD as MD (Plastic Surgery)  Richy Dorsey MD as Referring Physician (Family Medicine)  Phoenix Garcia MD as MD (Otolaryngology)  Gela Blank AuD as Audiologist (Audiology)  Nissen, Rick L, MD as MD (Otolaryngology)  Phoenix Garcia MD as Assigned Surgical Provider  Richy Dorsey MD as Assigned PCP  Celeste Fontaine MD as MD (Dermatology)  ARMANDO COSTA

## 2023-01-09 NOTE — PATIENT INSTRUCTIONS
Patient Instructions:  It was a pleasure to see you in the cardiology clinic today.      If you have any questions, call  Maggie Valencia RN, at [phone].   [facility].  To schedule an appointment or to leave a message for your Care Team Press #1  If you are a physician calling for another physician Press #2  For Billing Press #3  For Medical Records Press #4  We are encouraging the use of [employer] to communicate with your HealthCare Provider    Note the new medications: none  Stop the following medications: none    The results from today include: pending cardiac MRI and coronary angiogram when you return.  Please follow up with Dr. Morales on return      If you have an urgent need after hours (8:00 am to 4:30 pm) please call [phone] and ask for the cardiology fellow on call.

## 2023-01-10 DIAGNOSIS — E78.49 OTHER HYPERLIPIDEMIA: ICD-10-CM

## 2023-01-10 LAB
ATRIAL RATE - MUSE: 53 BPM
DIASTOLIC BLOOD PRESSURE - MUSE: NORMAL MMHG
INTERPRETATION ECG - MUSE: NORMAL
P AXIS - MUSE: 61 DEGREES
PR INTERVAL - MUSE: 166 MS
QRS DURATION - MUSE: 84 MS
QT - MUSE: 434 MS
QTC - MUSE: 407 MS
R AXIS - MUSE: 33 DEGREES
SYSTOLIC BLOOD PRESSURE - MUSE: NORMAL MMHG
T AXIS - MUSE: 55 DEGREES
VENTRICULAR RATE- MUSE: 53 BPM

## 2023-01-10 RX ORDER — ATORVASTATIN CALCIUM 40 MG/1
40 TABLET, FILM COATED ORAL EVERY EVENING
Qty: 90 TABLET | Refills: 0 | Status: SHIPPED | OUTPATIENT
Start: 2023-01-10 | End: 2023-04-12

## 2023-01-10 NOTE — TELEPHONE ENCOUNTER
"Request for medication refill:  Atorvastatin 40 mg tablet    Providers if patient needs an appointment and you are willing to give a one month supply please refill for one month and  send a letter/MyChart using \".SMILLIMITEDREFILL\" .smillimited and route chart to \"P SMI \" (Giving one month refill in non controlled medications is strongly recommended before denial)    If refill has been denied, meaning absolutely no refills without visit, please complete the smart phrase \".smirxrefuse\" and route it to the \"P SMI MED REFILLS\"  pool to inform the patient and the pharmacy.    Madhu Fournier MA        "

## 2023-01-12 ENCOUNTER — TELEPHONE (OUTPATIENT)
Dept: NEUROLOGY | Facility: CLINIC | Age: 53
End: 2023-01-12

## 2023-01-12 NOTE — TELEPHONE ENCOUNTER
[facility] Call Center    Phone Message    May a detailed message be left on voicemail: yes     Reason for Call: Other: Pt is calling because someone called him about making an appt with neurology for cerebral aneurysm. Pt says his PCP referred him. Pt had seen Dr. Issa in 2018 for this and was wanting to track it with MRIs but because of Covid the MRI never got done. Pt's PCP also ordered an MRA for pt. Pt is wondering can he see Dr. Issa for this again and is there anything else Dr. Issa may want to order for pt. Also, pt's cardiologist wants him to get a angiogram in March but would like pt's MRI/MRA to be completed before then. Writer did not schedule pt- protocols for aneurysm says Neurosurgery. Please contact pt to discuss how to proceed     Action Taken: Message routed to:  [facility] NEUROLOGY    Travel Screening: Not Applicable

## 2023-01-13 DIAGNOSIS — I67.1 CEREBRAL ANEURYSM, NONRUPTURED: Primary | ICD-10-CM

## 2023-01-16 NOTE — TELEPHONE ENCOUNTER
Called pt and left message informing him I spoke to him earlier and I see he has scheduled imaging.  Advised him to have an appt on the books with Dr. Issa and in the interim he can send a my chart message requesting the results of his MRA. Call back or send a my chart message if further questions.

## 2023-01-16 NOTE — TELEPHONE ENCOUNTER
Health Call Center    Phone Message    May a detailed message be left on voicemail: yes     Reason for Call: Other: Pt is calling to schedule an appt with Dr. Issa for a test he is going to have in [facility], his doctors is needing these results by March. Dr. Issa first avaliable isn't until June. He is wondering if his cardiologist would need these results read by a neurologist     Please call Pt at [phone] to discuss    Action Taken: Message routed to:  [facility] Neurology    Travel Screening: Not Applicable

## 2023-01-24 ENCOUNTER — OFFICE VISIT (OUTPATIENT)
Dept: FAMILY MEDICINE | Facility: CLINIC | Age: 53
End: 2023-01-24
Payer: COMMERCIAL

## 2023-01-24 VITALS
RESPIRATION RATE: 14 BRPM | WEIGHT: 292 LBS | SYSTOLIC BLOOD PRESSURE: 110 MMHG | DIASTOLIC BLOOD PRESSURE: 74 MMHG | TEMPERATURE: 98.2 F | HEART RATE: 58 BPM | OXYGEN SATURATION: 100 % | HEIGHT: 75 IN | BODY MASS INDEX: 36.31 KG/M2

## 2023-01-24 DIAGNOSIS — Z23 NEED FOR IMMUNIZATION AGAINST TYPHOID: ICD-10-CM

## 2023-01-24 DIAGNOSIS — Z23 NEED FOR HEPATITIS A IMMUNIZATION: ICD-10-CM

## 2023-01-24 DIAGNOSIS — Z23 NEED FOR IMMUNIZATION AGAINST YELLOW FEVER: ICD-10-CM

## 2023-01-24 DIAGNOSIS — Z71.84 ENCOUNTER FOR COUNSELING FOR TRAVEL: Primary | ICD-10-CM

## 2023-01-24 PROCEDURE — 90471 IMMUNIZATION ADMIN: CPT | Mod: GA | Performed by: PHYSICIAN ASSISTANT

## 2023-01-24 PROCEDURE — 90691 TYPHOID VACCINE IM: CPT | Mod: GA | Performed by: PHYSICIAN ASSISTANT

## 2023-01-24 PROCEDURE — 90472 IMMUNIZATION ADMIN EACH ADD: CPT | Mod: GA | Performed by: PHYSICIAN ASSISTANT

## 2023-01-24 PROCEDURE — 99401 PREV MED CNSL INDIV APPRX 15: CPT | Mod: 25 | Performed by: PHYSICIAN ASSISTANT

## 2023-01-24 PROCEDURE — 90632 HEPA VACCINE ADULT IM: CPT | Mod: GA | Performed by: PHYSICIAN ASSISTANT

## 2023-01-24 PROCEDURE — 90717 YELLOW FEVER VACCINE SUBQ: CPT | Mod: GA | Performed by: PHYSICIAN ASSISTANT

## 2023-01-24 RX ORDER — AZITHROMYCIN 500 MG/1
TABLET, FILM COATED ORAL
Qty: 3 TABLET | Refills: 0 | Status: SHIPPED | OUTPATIENT
Start: 2023-01-24 | End: 2023-04-26

## 2023-01-24 RX ORDER — DIPHENOXYLATE HCL/ATROPINE 2.5-.025MG
1 TABLET ORAL 4 TIMES DAILY PRN
Qty: 40 TABLET | Refills: 0 | Status: SHIPPED | OUTPATIENT
Start: 2023-01-24

## 2023-01-24 RX ORDER — SCOLOPAMINE TRANSDERMAL SYSTEM 1 MG/1
1 PATCH, EXTENDED RELEASE TRANSDERMAL
Qty: 5 PATCH | Refills: 0 | Status: SHIPPED | OUTPATIENT
Start: 2023-01-24 | End: 2023-09-12

## 2023-01-24 RX ORDER — ATOVAQUONE AND PROGUANIL HYDROCHLORIDE 250; 100 MG/1; MG/1
1 TABLET, FILM COATED ORAL DAILY
Qty: 23 TABLET | Refills: 0 | Status: SHIPPED | OUTPATIENT
Start: 2023-01-24 | End: 2023-04-26

## 2023-01-24 NOTE — PATIENT INSTRUCTIONS
"See travel packet provided  Recommend ultrathon (mosquito repellant), pepto bismol and imodium  The food and drink choices you make while traveling can impact your likelihood of getting sick.   If you aren't sure if a food or drink is safe, the saying \" BOIL IT, COOK IT, PEEL IT, OR FORGET IT\" can help you decide whether it's okay to consume.   Also bring hand  and sun screen with you.  Safe Travels       Today January 24, 2023 you received the    Hepatitis A Vaccine - This is your final dose.    Yellow Fever (YF)    Typhoid - injectable. This vaccine is valid for two years. .    These appointments can be made as a NURSE ONLY visit.    **It is very important for the vaccinations to be given on the scheduled day(s), this helps ensure you receive the full effectiveness of the vaccine.**    Please call [facility] with any questions [phone]    Thank you for visiting [facility]    "

## 2023-01-24 NOTE — PROGRESS NOTES
SUBJECTIVE: Lui Arrington , a 52 year old  male, presents for counseling and information regarding upcoming travel to [facility]. Special medical concerns include: none. He anticipates the following unusual exposures: none.    Itinerary:  [facility]    Departure Date: 02/17/2023 Return date: 03/03/2023    Reason for travel (i.e. Business, pleasure): Both    Visiting an urban or rural area?: Both    Accommodations (i.e. hotel, hostel, friends, family, etc): Hotel,[place]    Women - First day of your last period: N/A    IMMUNIZATION HISTORY  Have you received any vaccinations in the past 4 weeks?  No  Have you ever fainted from having your blood drawn or from an injection?  No  Have you ever had a fever reaction to vaccination?  Yes  Have you ever had any bad reaction or side effect from any vaccination?  Yes  Have you ever had hepatitis A or B vaccine?  No  Do you live (or work closely) with anyone who has AIDS, an AIDS-like condition, any other immune disorder or who is on chemotherapy for cancer?  No  Have you received any injection of immune globulin or any blood products during the past 12 months?  No    GENERAL MEDICAL HISTORY  Do you have a medical condition that warrants maintenance medication or physician follow-up?  Yes  Do you have a medical condition that is stable now, but that may recur while traveling?Yes  Has your spleen been removed?  No  Have you had an acute illness or a fever in the past 48 hours?  No  Are you pregnant, or might you become pregnant on this trip?  Any chance of pregnancy?  No  Are you breastfeeding?  No  Do you have HIV, AIDS, an AIDS-like condition, any other immune disorder, leukemia or cancer?  No  Do you have a severe combined immunodeficiency disease?  No  Have you had your thymus gland removed or history of problems with your thymus, such as myasthenia gravis, DiGeorge syndrome, or thymoma?  No    Do you have severe thrombocytopenia (low platelet count) or a coagulation  disorder?  No  Have you ever had a convulsion, seizure, epilepsy, neurologic condition or brain infection?  No  Do you have any stomach conditions?  Yes  Do you have a G6PD deficiency?  No  Do you have severe renal or kidney impairment?  No  Do you have a history of psychiatric problems?  No  Do you have a problem with strange dreams and/or nightmares?  No  Do you have insomnia?  No  Do you have problems with vaginitis?  No  Do you have psoriasis?  No  Are you prone to motion sickness?  Yes  Have you ever had headaches, nausea, vomiting, or breathing problems from altitude exposure?  No      Past Medical History:   Diagnosis Date     Brain aneurysm     Small     Coronary artery disease      Diverticulosis      Seasonal allergies      Substance dependence, in remission (H) 1993      Immunization History   Administered Date(s) Administered     COVID-19 Vaccine 12+ (Pfizer) 03/05/2021, 03/26/2021, 12/02/2021     COVID-19 Vaccine Bivalent Booster 12+ (Pfizer) 10/04/2022     DT (PEDS <7y) 06/14/2005     DTaP, Unspecified 12/10/2014     FLU 6-35 months 09/07/2019     Flu, Unspecified 12/10/2014     HepA, Unspecified 05/19/2011     HepA-Adult 05/19/2011     Influenza (H1N1) 01/19/2010     Influenza (IIV3) PF 01/05/2011, 12/06/2011, 12/02/2013, 09/07/2019     Influenza Vaccine 50-64 or 18-64 w/egg allergy (Flublok) 10/04/2022     Influenza Vaccine >6 months (Alfuria,Fluzone) 10/03/2018, 12/02/2021     Influenza Vaccine, 6+MO IM (QUADRIVALENT W/PRESERVATIVES) 12/10/2014     Influenza,INJ,MDCK,PF,Quad >6mo(Flucelvax) 09/07/2019     TDAP Vaccine (Boostrix) 12/10/2014     Td (Adult), Adsorbed 06/14/2005     Tdap (Adult) Unspecified Formulation 06/14/2005     Zoster vaccine recombinant adjuvanted (SHINGRIX) 12/14/2019, 06/30/2020       Current Outpatient Medications   Medication Sig Dispense Refill     aspirin 81 MG EC tablet Take 1 tablet (81 mg) by mouth daily 90 tablet 3     atorvastatin (LIPITOR) 40 MG tablet TAKE 1 TABLET  (40 MG) BY MOUTH EVERY EVENING TIME FOR A FOLLOW UP VISIT WITH LABS! 90 tablet 0     lisinopril (ZESTRIL) 10 MG tablet Take 2 tablets (20 mg) by mouth daily 180 tablet 1     oxymetazoline (AFRIN) 0.05 % nasal spray Spray 2 sprays into both nostrils daily as needed for other (bleeding from the nose) 22 mL 0     sildenafil (REVATIO) 20 MG tablet Take 1 tablet (20 mg) by mouth as needed (ED) 30 tablet 11     sodium chloride (OCEAN) 0.65 % nasal spray Spray 2 sprays in nostril every 4 hours 88 mL 0     Vitamin D, Cholecalciferol, 25 MCG (1000 UT) CAPS  (Patient not taking: Reported on 11/17/2022)       Allergies   Allergen Reactions     Ciprofloxacin Hives     Erythromycin      Seasonal Allergies         EXAM: deferred    Immunizations discussed include: Hepatitis A, Typhoid and Yellow Fever  Malaria prophylaxis recommended: Malarone  Symptomatic treatment for traveler's diarrhea: bismuth subsalicylate, loperamide/diphenoxylate and azithromycin    ASSESSMENT/PLAN:    (Z71.84) Encounter for counseling for travel  (primary encounter diagnosis)    Comment: Yellow fever, Hep A, and typhoid vaccines today. Patient will return or follow-up with PCP as needed. Prophylaxis given for Traveler's diarrhea and Malaria. All questions were answered.     Plan: atovaquone-proguanil (MALARONE) 250-100 MG         tablet, azithromycin (ZITHROMAX) 500 MG tablet,        diphenoxylate-atropine (LOMOTIL) 2.5-0.025 MG         tablet, scopolamine (TRANSDERM) 1 MG/3DAYS 72         hr patch            (Z23) Need for immunization against yellow fever  Comment:   Plan: YELLOW FEVER, LIVE SQ            (Z23) Need for hepatitis A immunization  Comment:   Plan: HEPATITIS A VACCINE (ADULT)            (Z23) Need for immunization against typhoid  Comment:   Plan: TYPHOID VACCINE, IM              I have reviewed general recommendations for safe travel   including: food/water precautions, insect avoidance, safe sex   practices given high prevalence of HIV  and other STDs,   roadway safety. Educational materials and links to the CDC   Traveler's health website have been provided.    Total time 15 minutes, greater than 50 percent in counseling   and coordination of care.

## 2023-02-02 ENCOUNTER — ANCILLARY PROCEDURE (OUTPATIENT)
Dept: MRI IMAGING | Facility: CLINIC | Age: 53
End: 2023-02-02
Attending: PSYCHIATRY & NEUROLOGY
Payer: COMMERCIAL

## 2023-02-02 DIAGNOSIS — I67.1 CEREBRAL ANEURYSM, NONRUPTURED: ICD-10-CM

## 2023-02-02 PROCEDURE — 70544 MR ANGIOGRAPHY HEAD W/O DYE: CPT | Performed by: RADIOLOGY

## 2023-02-27 DIAGNOSIS — F40.240 CLAUSTROPHOBIA: Primary | ICD-10-CM

## 2023-02-27 RX ORDER — LORAZEPAM 1 MG/1
1 TABLET ORAL EVERY 6 HOURS PRN
Qty: 1 TABLET | Refills: 0 | Status: CANCELLED | OUTPATIENT
Start: 2023-02-27

## 2023-03-06 DIAGNOSIS — F40.240 CLAUSTROPHOBIA: Primary | ICD-10-CM

## 2023-03-08 ENCOUNTER — TELEPHONE (OUTPATIENT)
Dept: CARDIOLOGY | Facility: CLINIC | Age: 53
End: 2023-03-08
Payer: COMMERCIAL

## 2023-03-09 ENCOUNTER — HOSPITAL ENCOUNTER (OUTPATIENT)
Dept: MRI IMAGING | Facility: CLINIC | Age: 53
Discharge: HOME | End: 2023-03-09
Attending: INTERNAL MEDICINE | Admitting: INTERNAL MEDICINE
Payer: COMMERCIAL

## 2023-03-09 DIAGNOSIS — I25.10 CORONARY ARTERY DISEASE INVOLVING NATIVE CORONARY ARTERY OF NATIVE HEART WITHOUT ANGINA PECTORIS: ICD-10-CM

## 2023-03-09 DIAGNOSIS — I10 BENIGN ESSENTIAL HYPERTENSION: ICD-10-CM

## 2023-03-09 DIAGNOSIS — R00.1 BRADYCARDIA: ICD-10-CM

## 2023-03-09 PROCEDURE — 75561 CARDIAC MRI FOR MORPH W/DYE: CPT | Mod: 26 | Performed by: INTERNAL MEDICINE

## 2023-03-09 PROCEDURE — 75561 CARDIAC MRI FOR MORPH W/DYE: CPT

## 2023-03-09 PROCEDURE — A9585 GADOBUTROL INJECTION: HCPCS | Performed by: INTERNAL MEDICINE

## 2023-03-09 PROCEDURE — 255N000002 HC RX 255 OP 636: Performed by: INTERNAL MEDICINE

## 2023-03-09 RX ORDER — GADOBUTROL 604.72 MG/ML
15 INJECTION INTRAVENOUS ONCE
Status: COMPLETED | OUTPATIENT
Start: 2023-03-09 | End: 2023-03-09

## 2023-03-09 RX ADMIN — GADOBUTROL 15 ML: 604.72 INJECTION INTRAVENOUS at 08:56

## 2023-03-21 ENCOUNTER — TELEPHONE (OUTPATIENT)
Dept: CARDIOLOGY | Facility: CLINIC | Age: 53
End: 2023-03-21
Payer: COMMERCIAL

## 2023-03-21 NOTE — CONFIDENTIAL NOTE
Left message with results of cMR. Dr. Morales still wants an angiogram when he gets back from [facility]. Left our number for him to call.

## 2023-03-24 ENCOUNTER — TELEPHONE (OUTPATIENT)
Dept: CARDIOLOGY | Facility: CLINIC | Age: 53
End: 2023-03-24
Payer: COMMERCIAL

## 2023-03-24 NOTE — TELEPHONE ENCOUNTER
[facility] Call Center    Phone Message    May a detailed message be left on voicemail: yes     Reason for Call: Other: Friday April 21st will work for the [employer]  Ok to leave a message in My Chart to confirm    Action Taken: Message routed to:  [facility] cardio    Travel Screening: Not Applicable     Thank you!  [facility]

## 2023-04-01 DIAGNOSIS — I10 BENIGN ESSENTIAL HYPERTENSION: ICD-10-CM

## 2023-04-03 RX ORDER — LISINOPRIL 10 MG/1
TABLET ORAL
Qty: 180 TABLET | Refills: 1 | Status: SHIPPED | OUTPATIENT
Start: 2023-04-03 | End: 2023-09-12

## 2023-04-03 NOTE — TELEPHONE ENCOUNTER
"Last seen 11/17/22    Request for medication refill:  lisinopril (ZESTRIL) 10 MG tablet  Providers if patient needs an appointment and you are willing to give a one month supply please refill for one month and  send a letter/MyChart using \".SMILLIMITEDREFILL\" .smillimited and route chart to \"P [facility] \" (Giving one month refill in non controlled medications is strongly recommended before denial)    If refill has been denied, meaning absolutely no refills without visit, please complete the smart phrase \".smirxrefuse\" and route it to the \"P SMI MED REFILLS\"  pool to inform the patient and the pharmacy.    Mary Eastman RN        "

## 2023-04-09 ENCOUNTER — HEALTH MAINTENANCE LETTER (OUTPATIENT)
Age: 53
End: 2023-04-09

## 2023-04-09 DIAGNOSIS — E78.49 OTHER HYPERLIPIDEMIA: ICD-10-CM

## 2023-04-12 RX ORDER — ATORVASTATIN CALCIUM 40 MG/1
40 TABLET, FILM COATED ORAL EVERY EVENING
Qty: 90 TABLET | Refills: 0 | Status: SHIPPED | OUTPATIENT
Start: 2023-04-12 | End: 2023-07-21

## 2023-04-12 NOTE — TELEPHONE ENCOUNTER
"Last seen 11/17/22, patient was supposed to f/u in 3 months nothing is scheduled    Request for medication refill:  atorvastatin (LIPITOR) 40 MG tablet  Providers if patient needs an appointment and you are willing to give a one month supply please refill for one month and  send a letter/MyChart using \".SMILLIMITEDREFILL\" .smillimited and route chart to \"P [facility] \" (Giving one month refill in non controlled medications is strongly recommended before denial)    If refill has been denied, meaning absolutely no refills without visit, please complete the smart phrase \".smirxrefuse\" and route it to the \"P SMI MED REFILLS\"  pool to inform the patient and the pharmacy.    Mary Eastman RN            "

## 2023-04-20 ENCOUNTER — TELEPHONE (OUTPATIENT)
Dept: CARDIOLOGY | Facility: CLINIC | Age: 53
End: 2023-04-20
Payer: COMMERCIAL

## 2023-04-20 NOTE — TELEPHONE ENCOUNTER
Left voicemail to remind patient of Cardiac Cath Lab appointment on 4/21 and inform patient of updated Visitor Policy, need for  and that someone needs to stay with pt after.

## 2023-04-21 ENCOUNTER — APPOINTMENT (OUTPATIENT)
Dept: LAB | Facility: CLINIC | Age: 53
End: 2023-04-21
Attending: INTERNAL MEDICINE
Payer: COMMERCIAL

## 2023-04-21 ENCOUNTER — HOSPITAL ENCOUNTER (OUTPATIENT)
Facility: CLINIC | Age: 53
Discharge: HOME | End: 2023-04-21
Attending: INTERNAL MEDICINE | Admitting: INTERNAL MEDICINE
Payer: COMMERCIAL

## 2023-04-21 ENCOUNTER — APPOINTMENT (OUTPATIENT)
Dept: MEDSURG UNIT | Facility: CLINIC | Age: 53
End: 2023-04-21
Attending: INTERNAL MEDICINE
Payer: COMMERCIAL

## 2023-04-21 VITALS
TEMPERATURE: 97.8 F | WEIGHT: 290.35 LBS | DIASTOLIC BLOOD PRESSURE: 61 MMHG | RESPIRATION RATE: 16 BRPM | BODY MASS INDEX: 36.78 KG/M2 | OXYGEN SATURATION: 97 % | SYSTOLIC BLOOD PRESSURE: 120 MMHG | HEART RATE: 57 BPM

## 2023-04-21 DIAGNOSIS — R00.1 BRADYCARDIA: ICD-10-CM

## 2023-04-21 DIAGNOSIS — I10 BENIGN ESSENTIAL HYPERTENSION: ICD-10-CM

## 2023-04-21 DIAGNOSIS — I25.10 CORONARY ARTERY DISEASE INVOLVING NATIVE CORONARY ARTERY OF NATIVE HEART WITHOUT ANGINA PECTORIS: ICD-10-CM

## 2023-04-21 PROBLEM — Z98.890 STATUS POST CORONARY ANGIOGRAM: Status: ACTIVE | Noted: 2023-04-21

## 2023-04-21 LAB
ANION GAP SERPL CALCULATED.3IONS-SCNC: 11 MMOL/L (ref 7–15)
APTT PPP: 26 SECONDS (ref 22–38)
ATRIAL RATE - MUSE: 53 BPM
BUN SERPL-MCNC: 15.5 MG/DL (ref 6–20)
CALCIUM SERPL-MCNC: 9.4 MG/DL (ref 8.6–10)
CHLORIDE SERPL-SCNC: 104 MMOL/L (ref 98–107)
CREAT SERPL-MCNC: 0.97 MG/DL (ref 0.67–1.17)
DEPRECATED HCO3 PLAS-SCNC: 25 MMOL/L (ref 22–29)
DIASTOLIC BLOOD PRESSURE - MUSE: NORMAL MMHG
ERYTHROCYTE [DISTWIDTH] IN BLOOD BY AUTOMATED COUNT: 13.8 % (ref 10–15)
GFR SERPL CREATININE-BSD FRML MDRD: >90 ML/MIN/1.73M2
GLUCOSE SERPL-MCNC: 100 MG/DL (ref 70–99)
HCT VFR BLD AUTO: 46.6 % (ref 40–53)
HGB BLD-MCNC: 14.3 G/DL (ref 13.3–17.7)
INR PPP: 0.86 (ref 0.85–1.15)
INTERPRETATION ECG - MUSE: NORMAL
MCH RBC QN AUTO: 26.9 PG (ref 26.5–33)
MCHC RBC AUTO-ENTMCNC: 30.7 G/DL (ref 31.5–36.5)
MCV RBC AUTO: 88 FL (ref 78–100)
P AXIS - MUSE: 37 DEGREES
PLATELET # BLD AUTO: 189 10E3/UL (ref 150–450)
POTASSIUM SERPL-SCNC: 4.8 MMOL/L (ref 3.4–5.3)
PR INTERVAL - MUSE: 168 MS
QRS DURATION - MUSE: 74 MS
QT - MUSE: 430 MS
QTC - MUSE: 403 MS
R AXIS - MUSE: 22 DEGREES
RBC # BLD AUTO: 5.31 10E6/UL (ref 4.4–5.9)
SODIUM SERPL-SCNC: 140 MMOL/L (ref 136–145)
SYSTOLIC BLOOD PRESSURE - MUSE: NORMAL MMHG
T AXIS - MUSE: 35 DEGREES
VENTRICULAR RATE- MUSE: 53 BPM
WBC # BLD AUTO: 6.5 10E3/UL (ref 4–11)

## 2023-04-21 PROCEDURE — 250N000009 HC RX 250: Performed by: INTERNAL MEDICINE

## 2023-04-21 PROCEDURE — 272N000001 HC OR GENERAL SUPPLY STERILE: Performed by: INTERNAL MEDICINE

## 2023-04-21 PROCEDURE — 80048 BASIC METABOLIC PNL TOTAL CA: CPT | Performed by: INTERNAL MEDICINE

## 2023-04-21 PROCEDURE — 99153 MOD SED SAME PHYS/QHP EA: CPT | Performed by: INTERNAL MEDICINE

## 2023-04-21 PROCEDURE — 36415 COLL VENOUS BLD VENIPUNCTURE: CPT | Performed by: INTERNAL MEDICINE

## 2023-04-21 PROCEDURE — 99152 MOD SED SAME PHYS/QHP 5/>YRS: CPT | Mod: GC | Performed by: INTERNAL MEDICINE

## 2023-04-21 PROCEDURE — 93010 ELECTROCARDIOGRAM REPORT: CPT | Mod: 59 | Performed by: INTERNAL MEDICINE

## 2023-04-21 PROCEDURE — 999N000142 HC STATISTIC PROCEDURE PREP ONLY

## 2023-04-21 PROCEDURE — 999N000133 HC STATISTIC PP CARE STAGE 2

## 2023-04-21 PROCEDURE — 93454 CORONARY ARTERY ANGIO S&I: CPT | Mod: 26 | Performed by: INTERNAL MEDICINE

## 2023-04-21 PROCEDURE — 258N000003 HC RX IP 258 OP 636: Performed by: INTERNAL MEDICINE

## 2023-04-21 PROCEDURE — 93005 ELECTROCARDIOGRAM TRACING: CPT

## 2023-04-21 PROCEDURE — 250N000011 HC RX IP 250 OP 636: Performed by: INTERNAL MEDICINE

## 2023-04-21 PROCEDURE — 85027 COMPLETE CBC AUTOMATED: CPT | Performed by: INTERNAL MEDICINE

## 2023-04-21 PROCEDURE — C1894 INTRO/SHEATH, NON-LASER: HCPCS | Performed by: INTERNAL MEDICINE

## 2023-04-21 PROCEDURE — 93454 CORONARY ARTERY ANGIO S&I: CPT | Performed by: INTERNAL MEDICINE

## 2023-04-21 PROCEDURE — 99152 MOD SED SAME PHYS/QHP 5/>YRS: CPT | Performed by: INTERNAL MEDICINE

## 2023-04-21 PROCEDURE — 250N000013 HC RX MED GY IP 250 OP 250 PS 637: Performed by: INTERNAL MEDICINE

## 2023-04-21 PROCEDURE — 85730 THROMBOPLASTIN TIME PARTIAL: CPT | Performed by: INTERNAL MEDICINE

## 2023-04-21 PROCEDURE — 85610 PROTHROMBIN TIME: CPT | Performed by: INTERNAL MEDICINE

## 2023-04-21 RX ORDER — POTASSIUM CHLORIDE 750 MG/1
20 TABLET, EXTENDED RELEASE ORAL
Status: DISCONTINUED | OUTPATIENT
Start: 2023-04-21 | End: 2023-04-21 | Stop reason: HOSPADM

## 2023-04-21 RX ORDER — FENTANYL CITRATE 50 UG/ML
INJECTION, SOLUTION INTRAMUSCULAR; INTRAVENOUS
Status: DISCONTINUED | OUTPATIENT
Start: 2023-04-21 | End: 2023-04-21 | Stop reason: HOSPADM

## 2023-04-21 RX ORDER — LIDOCAINE 40 MG/G
CREAM TOPICAL
Status: DISCONTINUED | OUTPATIENT
Start: 2023-04-21 | End: 2023-04-21 | Stop reason: HOSPADM

## 2023-04-21 RX ORDER — FLUMAZENIL 0.1 MG/ML
0.2 INJECTION, SOLUTION INTRAVENOUS
Status: DISCONTINUED | OUTPATIENT
Start: 2023-04-21 | End: 2023-04-21 | Stop reason: HOSPADM

## 2023-04-21 RX ORDER — FENTANYL CITRATE 50 UG/ML
25 INJECTION, SOLUTION INTRAMUSCULAR; INTRAVENOUS
Status: DISCONTINUED | OUTPATIENT
Start: 2023-04-21 | End: 2023-04-21 | Stop reason: HOSPADM

## 2023-04-21 RX ORDER — NALOXONE HYDROCHLORIDE 0.4 MG/ML
0.4 INJECTION, SOLUTION INTRAMUSCULAR; INTRAVENOUS; SUBCUTANEOUS
Status: DISCONTINUED | OUTPATIENT
Start: 2023-04-21 | End: 2023-04-21 | Stop reason: HOSPADM

## 2023-04-21 RX ORDER — SODIUM CHLORIDE 9 MG/ML
INJECTION, SOLUTION INTRAVENOUS CONTINUOUS
Status: DISCONTINUED | OUTPATIENT
Start: 2023-04-21 | End: 2023-04-21 | Stop reason: HOSPADM

## 2023-04-21 RX ORDER — ASPIRIN 325 MG
325 TABLET ORAL ONCE
Status: COMPLETED | OUTPATIENT
Start: 2023-04-21 | End: 2023-04-21

## 2023-04-21 RX ORDER — POTASSIUM CHLORIDE 750 MG/1
40 TABLET, EXTENDED RELEASE ORAL
Status: DISCONTINUED | OUTPATIENT
Start: 2023-04-21 | End: 2023-04-21 | Stop reason: HOSPADM

## 2023-04-21 RX ORDER — OXYCODONE HYDROCHLORIDE 5 MG/1
5 TABLET ORAL EVERY 4 HOURS PRN
Status: DISCONTINUED | OUTPATIENT
Start: 2023-04-21 | End: 2023-04-21 | Stop reason: HOSPADM

## 2023-04-21 RX ORDER — NALOXONE HYDROCHLORIDE 0.4 MG/ML
0.2 INJECTION, SOLUTION INTRAMUSCULAR; INTRAVENOUS; SUBCUTANEOUS
Status: DISCONTINUED | OUTPATIENT
Start: 2023-04-21 | End: 2023-04-21 | Stop reason: HOSPADM

## 2023-04-21 RX ORDER — ATROPINE SULFATE 0.1 MG/ML
0.5 INJECTION INTRAVENOUS
Status: DISCONTINUED | OUTPATIENT
Start: 2023-04-21 | End: 2023-04-21 | Stop reason: HOSPADM

## 2023-04-21 RX ORDER — ONDANSETRON 2 MG/ML
INJECTION INTRAMUSCULAR; INTRAVENOUS
Status: DISCONTINUED | OUTPATIENT
Start: 2023-04-21 | End: 2023-04-21 | Stop reason: HOSPADM

## 2023-04-21 RX ORDER — ASPIRIN 81 MG/1
243 TABLET, CHEWABLE ORAL ONCE
Status: COMPLETED | OUTPATIENT
Start: 2023-04-21 | End: 2023-04-21

## 2023-04-21 RX ORDER — IOPAMIDOL 755 MG/ML
INJECTION, SOLUTION INTRAVASCULAR
Status: DISCONTINUED | OUTPATIENT
Start: 2023-04-21 | End: 2023-04-21 | Stop reason: HOSPADM

## 2023-04-21 RX ORDER — OXYCODONE HYDROCHLORIDE 5 MG/1
10 TABLET ORAL EVERY 4 HOURS PRN
Status: DISCONTINUED | OUTPATIENT
Start: 2023-04-21 | End: 2023-04-21 | Stop reason: HOSPADM

## 2023-04-21 RX ADMIN — SODIUM CHLORIDE: 9 INJECTION, SOLUTION INTRAVENOUS at 10:09

## 2023-04-21 RX ADMIN — ASPIRIN 325 MG ORAL TABLET 325 MG: 325 PILL ORAL at 10:09

## 2023-04-21 ASSESSMENT — ACTIVITIES OF DAILY LIVING (ADL)
ADLS_ACUITY_SCORE: 35

## 2023-04-21 NOTE — Clinical Note
dry, intact, no bleeding and no hematoma. 4fr RFA sheath removed, manual pressure applied hemostasis achieved, bandage placed.

## 2023-04-21 NOTE — H&P
History and Physical: Cardiology Cath Lab Service    Lui Arrington MRN# [id number]   YOB: 1970 Age: 53 year old         Assessment and plan:       - No contraindications noted, will move forward with (procedure) coronary angiogram and possible coronary intervention  - Patient will be admitted as OP to Obs unit post procedure, with plans to discharge home after post procedure orders have been met    Patient seen and discussed with Dr. Carmen Rosenthal MD  Cardiovascular disease fellow  [facility]  04/21/2023 10:25 AM       HPI:       Patient reports feeling well today, denies recent illness, reports occasional  chest discomfort, stage II NYHA shortness of breath, no abdominal pain, headache, vision change, or weakness. No major changes in health history since previous visit.     Past Medical History:   Diagnosis Date     Brain aneurysm     Small     Coronary artery disease      Diverticulosis      Seasonal allergies      Substance dependence, in remission (H) 1993       Past Surgical History:   Procedure Laterality Date     COLONOSCOPY  8/4/2014    Procedure: COMBINED COLONOSCOPY, SINGLE BIOPSY/POLYPECTOMY BY BIOPSY;  Surgeon: Pb Celaya MD;  Location: [facility]     TURBINATE REDUCTION Bilateral 8/16/2021    Procedure: REDUCTION, BILATERAL INFERIOR NASAL TURBINATE;  Surgeon: Phoenix Garcia MD;  Location: [facility] OR     VASECTOMY         No current facility-administered medications on file prior to encounter.  sildenafil (REVATIO) 20 MG tablet, Take 1 tablet (20 mg) by mouth as needed (ED)  Vitamin D, Cholecalciferol, 25 MCG (1000 UT) CAPS,   aspirin 81 MG EC tablet, Take 1 tablet (81 mg) by mouth daily        Family History   Problem Relation Age of Onset     Cancer Mother      Cancer - colorectal Mother      Hypertension Mother      Crohn's Disease Mother      Cancer Father      Diabetes Maternal Grandmother      LUISALORENA. Maternal Grandmother      MILES  Paternal Grandfather      Hypertension Brother      Kidney Cancer Brother        Social History     Tobacco Use     Smoking status: Former     Types: Cigarettes     Quit date: 1998     Years since quittin.3     Smokeless tobacco: Never   Vaping Use     Vaping status: Never Used   Substance Use Topics     Alcohol use: No       Allergies   Allergen Reactions     Ciprofloxacin Hives     Erythromycin      Seasonal Allergies          ROS:   Skin: negative  Respiratory: No shortness of breath, dyspnea on exertion, cough, or hemoptysis  Cardiovascular: negative and chest pain  Gastrointestinal: negative  Genitourinary: negative  Musculoskeletal: negative  Neurologic: negative  Hematologic/Lymphatic/Immunologic: negative  Endocrine: negative    Physical Examination:  Vitals: BP (!) 145/75 (BP Location: Right arm)   Pulse 52   Temp 97.8  F (36.6  C) (Oral)   Resp 18   Wt 131.7 kg (290 lb 5.5 oz)   SpO2 99%   BMI 36.78 kg/m    BMI= Body mass index is 36.78 kg/m .    GENERAL APPEARANCE: healthy, alert and no distress  HEENT: no icterus, no xanthelasmas, normal pupil size and reaction, normal palate, mucosa moist  NECK: JVP 1 cm, brisk carotid upstroke bilaterally  CHEST: lungs clear to auscultation without rales, rhonchi or wheezes, no use of accessory muscles, no retractions  CARDIOVASCULAR: regular rhythm, normal S1 and S2, no S3 or S4 and no murmur, click or rub.  ABDOMEN: soft, non tender, without hepatosplenomegaly, no masses palpable, bowel sounds normal  EXTREMITIES: warm, very discrete edema, DP/PT pulses 2+ bilaterally, no clubbing or cyanosis   NEURO: alert and oriented to person/place/time, normal speech, gait and affect  SKIN: no ecchymoses, no rashes      Laboratory:  CMP  Recent Labs   Lab 23  0923      POTASSIUM 4.8   CHLORIDE 104   CO2 25   ANIONGAP 11   *   BUN 15.5   CR 0.97   GFRESTIMATED >90   MARIO 9.4     CBC  Recent Labs   Lab 23  0923   WBC 6.5   RBC 5.31   HGB 14.3    HCT 46.6   MCV 88   MCH 26.9   MCHC 30.7*   RDW 13.8          Lab Results   Component Value Date    TROPI <0.015 09/03/2018    TROPI <0.015 09/03/2018    TROPI <0.015 09/03/2018    TROPONIN 0.01 09/03/2018         EKG: will be performed before the procedure

## 2023-04-21 NOTE — Clinical Note
Conscious sedation is planned for this procedure.    Provider immediate assessment completed.  spontaneous/unlabored

## 2023-04-21 NOTE — PROGRESS NOTES
/61   Pulse 57   Temp 97.8  F (36.6  C) (Oral)   Resp 16   Wt 131.7 kg (290 lb 5.5 oz)   SpO2 97%   BMI 36.78 kg/m    Condition is stable s/p Cors. Vital Signs are stable/WNL. Right groin site clean, dry and intact, cms intact.  Discharge instructions reviewed with patient and questions answered. Patient verbalizes understanding. IV removed. Pain under control. Patient is ambulating and voiding spontaneously. Patient is tolerating regular diet and denies any N/V. Patient to be discharged to home via wife. Patient has all belongings.

## 2023-04-21 NOTE — Clinical Note
Hemodynamic equipment used: 5 lead ECG, Thermodynamic Process ControlK With 3 Leads, Machine BP Cuff and pulse oximeter probe.

## 2023-04-21 NOTE — PROGRESS NOTES
D/I/A: Pt roomed on 3C in bay 32.  Arrived via litter and accompanied by CCL RN On/Off: Off monitor.  VSSA.  Rhythm upon arrival SB on monitor.  Denies pain or sob.  Reviewed activity restrictions and when to notify RN, ie-changes to breathing or increased chest pressure or chest pain.  CCL access:  Right groin-CDI-2 hour bedrest complete at 1400.  P: Continue to monitor status.  Discharge to home once meeting criteria.  Wife will  patient post procedure.

## 2023-04-21 NOTE — PROGRESS NOTES
Arrived to Unit 2a for CORS procedure in CCL. AFVSS. Denies pain. Labs resulting. Consent being done. ECG ordered. Contrast reviewed. Aspirin 325mg PO given. Bilat pp & pt pulses palpable/marked. Pt's wife off site, to drive pt home post procedure.

## 2023-04-21 NOTE — DISCHARGE INSTRUCTIONS
Going Home after a Coronary Angiogram    After you go home:  Have an adult stay with you for 24 hours.  Drink plenty of fluids.  You may eat your normal diet, unless your doctor tells you otherwise.  For 24 hours:  - Relax and take it easy.  - Do NOT smoke.  - Do NOT make any important or legal decisions.  - Do NOT drive or operate machines at home or at work.  - Do NOT drink alcohol.    Remove the Band-Aid after 24 hours. If there is minor oozing, apply another Band-aid and remove it after 12 hours.  For 2 days, do NOT have sex or do any heavy exercise.  Do NOT take a bath, or use a hot tub or pool for at least 3 days. You may shower.    Care of groin site  It is normal to have a small bruise or lump at the site.  Do NOT scrub the site.  For the first 2 days, when you cough, sneeze or move your bowels, hold your hand over the puncture site and press gently.  Do NOT lift more than 10 pounds for at least 3 to 5 days.  Do not use lotion or powder near the puncture site for 3 days.  If you start bleeding from the site in your groin, lie down flat and press firmly on the site. Call  your doctor as soon as you can.    Medicines  There have been no changes to your current medications.   Call your doctor if:  You have a large or growing hard lump around the site.    The site is red, swollen, hot or tender.  Blood or fluid is draining from the site.  You have chills or a fever greater than 101 F (38 C).  Your leg or arm turns bluish, feels numb or cool.  You have hives, a rash or unusual itching.  Call 911 right away if you have:   Bleeding that does not stop.   Heavy bleeding.  [facility] [place]:  [phone] (7 days a week)

## 2023-04-24 ENCOUNTER — TELEPHONE (OUTPATIENT)
Dept: CARDIOLOGY | Facility: CLINIC | Age: 53
End: 2023-04-24
Payer: COMMERCIAL

## 2023-04-24 NOTE — TELEPHONE ENCOUNTER
S-(situation): patient had coronary angiogram on Friday April 21.     Prox LAD to Mid LAD lesion is 30% stenosed.     Right dominant coronary circulation.  Mild diffuse disease.  Presence of a non obstructive 30% lesion of the primal-mid LAD.    RFA is flat and dry, reviewed activity restrictions.   No new meds at discharge.    B-(background): Lui Arrington is a 52 year old male  with pmh notable for CAD, and small brain aneurysm for paresthesias found to have carpal tunnel syndrome here for coronary angiogram as workup for chest discomfort.    A-(assessment): stable post angiogram     R-(recommendations): follow up with NP

## 2023-04-24 NOTE — PROGRESS NOTES
[facility] Dermatology Note  Encounter Date: 2023  Office Visit     Dermatology Problem List:  1. Family hx melanoma (father, MGF, both )  # Lesions to monitor, left mid frontal scalp and left scapha  - photo 2023  # NUB, right medial canthus, suspect hidrocystoma  - referral to ophthalmology    # NUB, central upper back, s/p shave bx 2023   # NUB, right upper lateral arm, s/p shave bx 2023   # NUB, left chest, s/p shave bx 2023   ____________________________________________    Assessment & Plan:    # NUB, central upper back, biopsy ddx dysplastic nevus r/o melanoma  - Shave biopsy today, see procedure below    # NUB, right upper lateral arm, biopsy ddx BCC  - Shave biopsy today, see procedure below    # NUB, left chest, biopsy ddx dysplastic nevus r/o melanoma  - Shave biopsy today, see procedure below    # Lesions to monitor, left mid frontal scalp (suspect benign nevus) and left scapha (suspect lentigo v benign nevus)  - Photodocumentation today  - Monitor    # NUB, right medial canthus, suspect hidrocystoma  - Referral to ophthalmology for eval and possibly treatment as pt interested in removal    # Seborrheic keratoses  - Reassured of benign nature, no treatment necessary.     # Multiple benign nevi  # Solar lentigines  - Monitor for ABCDEs of melanoma   - Continue sun protection - recommend SPF 30 or higher with frequent application   - Return sooner if noticing changing or symptomatic lesions     # Family hx melanoma   - Annual skin exams recommended  - Discussed hasn't quite reached threshold for genetic testing with 3 family members affected but advised could consider discussion given brother with renal carcinoma, dad with melanoma, mom with colon cancer, MGF with melanoma, which patient will consider    Procedures Performed:   - Shave biopsy procedure note, location(s): see above. After discussion of benefits and risks including but not limited to  bleeding, infection, scar, incomplete removal, recurrence, and non-diagnostic biopsy, written consent and photographs were obtained. The area was cleaned with isopropyl alcohol. 0.5mL of 1% lidocaine with epinephrine was injected to obtain adequate anesthesia of lesion(s). Shave biopsy at site(s) performed. Hemostasis was achieved with aluminium chloride. Petrolatum ointment and a sterile dressing were applied. The patient tolerated the procedure and no complications were noted. The patient was provided with verbal and written post care instructions.     Follow-up: 6 month(s) in-person, or earlier for new or changing lesions    Staff and Scribe:     Scribe Disclosure:  I, Tristan Atkins, am serving as a scribe to document services personally performed by Celeste Fontaine MD based on data collection and the provider's statements to me.     Provider Disclosure:   The documentation recorded by the scribe accurately reflects the services I personally performed and the decisions made by me.    Celeste Fontaine MD    Department of Dermatology  [facility]: Phone: [phone], Fax: [phone]  4/26/2023     ____________________________________________    CC: Derm Problem (Adam is here today for a skin check. Lesions of concern on chest)    HPI:  Mr. Lui Arrintgon is a(n) 53 year old male who presents today as a new patient for FBSE. Last seen in dermatology by Dr. Calles on 9/2/2014, at which time no lesions of concern were noted.    Today, patient reports a lesion of concern on the chest. Reports family history of melanoma (father and maternal grandfather). There is also family history of kidney cancer and colon cancer. He reports a history of sun exposure. He wears sunscreen and typically tans with sun exposure. No tanning bed use.    Patient is otherwise feeling well, without additional skin concerns.    Labs  Reviewed:  N/A    Physical Exam:  Vitals: There were no vitals taken for this visit.  SKIN: Total skin excluding the undergarment areas was performed. The exam included the head/face, neck, both arms, chest, back, abdomen, both legs, digits and/or nails.   - Left mid frontal scalp, 1 cm flesh colored papule with central scar-like structures and peripheral pigment; similar lesion on R occipital scalp.  - Central upper back, light brown macule with globular pigment.  - Left scapha, 5 mm even light brown macule.  - Right upper lateral arm, shiny pink macule.  - Left chest, 7 mm light brown macule with central globules.  - Right medial canthus, somewhat translucent flesh colored papule.  - Multiple regular brown pigmented macules and papules are identified on the trunk and extremities.   - Scattered brown macules on sun exposed areas.  - There are waxy stuck on tan to brown papules on the trunk and extremities.   - No other lesions of concern on areas examined.     Medications:  Current Outpatient Medications   Medication     aspirin 81 MG EC tablet     atorvastatin (LIPITOR) 40 MG tablet     diphenoxylate-atropine (LOMOTIL) 2.5-0.025 MG tablet     lisinopril (ZESTRIL) 10 MG tablet     scopolamine (TRANSDERM) 1 MG/3DAYS 72 hr patch     sildenafil (REVATIO) 20 MG tablet     Vitamin D, Cholecalciferol, 25 MCG (1000 UT) CAPS     No current facility-administered medications for this visit.      Past Medical History:   Patient Active Problem List   Diagnosis     Benign essential hypertension     Coronary artery disease involving native coronary artery of native heart without angina pectoris     Cerebral aneurysm, nonruptured     Numbness of fingers of both hands     Hypertrophy of both inferior nasal turbinates     Nasal obstruction     Morbid obesity (H)     Bradycardia     Status post coronary angiogram     Past Medical History:   Diagnosis Date     Brain aneurysm     Small     Coronary artery disease      Diverticulosis       Seasonal allergies      Substance dependence, in remission (H) 1993        CC Louis Samuels MD  [address] [place] [address] on close of this encounter.

## 2023-04-26 ENCOUNTER — OFFICE VISIT (OUTPATIENT)
Dept: DERMATOLOGY | Facility: CLINIC | Age: 53
End: 2023-04-26
Attending: FAMILY MEDICINE
Payer: COMMERCIAL

## 2023-04-26 ENCOUNTER — TELEPHONE (OUTPATIENT)
Dept: OPHTHALMOLOGY | Facility: CLINIC | Age: 53
End: 2023-04-26

## 2023-04-26 DIAGNOSIS — L82.1 SEBORRHEIC KERATOSIS: ICD-10-CM

## 2023-04-26 DIAGNOSIS — D22.9 MULTIPLE BENIGN NEVI: ICD-10-CM

## 2023-04-26 DIAGNOSIS — D49.2 NEOPLASM OF UNSPECIFIED BEHAVIOR OF BONE, SOFT TISSUE, AND SKIN: Primary | ICD-10-CM

## 2023-04-26 DIAGNOSIS — Z80.8 FAMILY HISTORY OF MALIGNANT MELANOMA: ICD-10-CM

## 2023-04-26 DIAGNOSIS — L81.4 SOLAR LENTIGO: ICD-10-CM

## 2023-04-26 PROCEDURE — 11103 TANGNTL BX SKIN EA SEP/ADDL: CPT | Performed by: DERMATOLOGY

## 2023-04-26 PROCEDURE — 88305 TISSUE EXAM BY PATHOLOGIST: CPT | Mod: 26 | Performed by: DERMATOLOGY

## 2023-04-26 PROCEDURE — 88342 IMHCHEM/IMCYTCHM 1ST ANTB: CPT | Mod: 26 | Performed by: DERMATOLOGY

## 2023-04-26 PROCEDURE — 88342 IMHCHEM/IMCYTCHM 1ST ANTB: CPT | Mod: TC | Performed by: DERMATOLOGY

## 2023-04-26 PROCEDURE — 99203 OFFICE O/P NEW LOW 30 MIN: CPT | Mod: 25 | Performed by: DERMATOLOGY

## 2023-04-26 PROCEDURE — 11102 TANGNTL BX SKIN SINGLE LES: CPT | Performed by: DERMATOLOGY

## 2023-04-26 ASSESSMENT — PAIN SCALES - GENERAL: PAINLEVEL: NO PAIN (0)

## 2023-04-26 NOTE — LETTER
2023       RE: Lui Arrington  [address] [place] [address]     Dear Colleague,    Thank you for referring your patient, Lui Arrington, to the [facility] DERMATOLOGY CLINIC [place] at [facility]. Please see a copy of my visit note below.    [facility] Dermatology Note  Encounter Date: 2023  Office Visit     Dermatology Problem List:  1. Family hx melanoma (father, MGF, both )  # Lesions to monitor, left mid frontal scalp and left scapha  - photo 2023  # NUB, right medial canthus, suspect hidrocystoma  - referral to ophthalmology    # NUB, central upper back, s/p shave bx 2023   # NUB, right upper lateral arm, s/p shave bx 2023   # NUB, left chest, s/p shave bx 2023   ____________________________________________    Assessment & Plan:    # NUB, central upper back, biopsy ddx dysplastic nevus r/o melanoma  - Shave biopsy today, see procedure below    # NUB, right upper lateral arm, biopsy ddx BCC  - Shave biopsy today, see procedure below    # NUB, left chest, biopsy ddx dysplastic nevus r/o melanoma  - Shave biopsy today, see procedure below    # Lesions to monitor, left mid frontal scalp (suspect benign nevus) and left scapha (suspect lentigo v benign nevus)  - Photodocumentation today  - Monitor    # NUB, right medial canthus, suspect hidrocystoma  - Referral to ophthalmology for eval and possibly treatment as pt interested in removal    # Seborrheic keratoses  - Reassured of benign nature, no treatment necessary.     # Multiple benign nevi  # Solar lentigines  - Monitor for ABCDEs of melanoma   - Continue sun protection - recommend SPF 30 or higher with frequent application   - Return sooner if noticing changing or symptomatic lesions     # Family hx melanoma   - Annual skin exams recommended  - Discussed hasn't quite reached threshold for genetic testing with 3 family members  affected but advised could consider discussion given brother with renal carcinoma, dad with melanoma, mom with colon cancer, MGF with melanoma, which patient will consider    Procedures Performed:   - Shave biopsy procedure note, location(s): see above. After discussion of benefits and risks including but not limited to bleeding, infection, scar, incomplete removal, recurrence, and non-diagnostic biopsy, written consent and photographs were obtained. The area was cleaned with isopropyl alcohol. 0.5mL of 1% lidocaine with epinephrine was injected to obtain adequate anesthesia of lesion(s). Shave biopsy at site(s) performed. Hemostasis was achieved with aluminium chloride. Petrolatum ointment and a sterile dressing were applied. The patient tolerated the procedure and no complications were noted. The patient was provided with verbal and written post care instructions.     Follow-up: 6 month(s) in-person, or earlier for new or changing lesions    Staff and Scribe:     Scribe Disclosure:  I, Tristan Atkins, am serving as a scribe to document services personally performed by Celeste Fontaine MD based on data collection and the provider's statements to me.     Provider Disclosure:   The documentation recorded by the scribe accurately reflects the services I personally performed and the decisions made by me.    Celeste Fontaine MD    Department of Dermatology  [facility]: Phone: [phone], Fax: [phone]  4/26/2023     ____________________________________________    CC: Derm Problem (Adam is here today for a skin check. Lesions of concern on chest)    HPI:  Mr. Lui Arrington is a(n) 53 year old male who presents today as a new patient for FBSE. Last seen in dermatology by Dr. Calles on 9/2/2014, at which time no lesions of concern were noted.    Today, patient reports a lesion of concern on the chest. Reports  family history of melanoma (father and maternal grandfather). There is also family history of kidney cancer and colon cancer. He reports a history of sun exposure. He wears sunscreen and typically tans with sun exposure. No tanning bed use.    Patient is otherwise feeling well, without additional skin concerns.    Labs Reviewed:  N/A    Physical Exam:  Vitals: There were no vitals taken for this visit.  SKIN: Total skin excluding the undergarment areas was performed. The exam included the head/face, neck, both arms, chest, back, abdomen, both legs, digits and/or nails.   - Left mid frontal scalp, 1 cm flesh colored papule with central scar-like structures and peripheral pigment; similar lesion on R occipital scalp.  - Central upper back, light brown macule with globular pigment.  - Left scapha, 5 mm even light brown macule.  - Right upper lateral arm, shiny pink macule.  - Left chest, 7 mm light brown macule with central globules.  - Right medial canthus, somewhat translucent flesh colored papule.  - Multiple regular brown pigmented macules and papules are identified on the trunk and extremities.   - Scattered brown macules on sun exposed areas.  - There are waxy stuck on tan to brown papules on the trunk and extremities.   - No other lesions of concern on areas examined.     Medications:  Current Outpatient Medications   Medication    aspirin 81 MG EC tablet    atorvastatin (LIPITOR) 40 MG tablet    diphenoxylate-atropine (LOMOTIL) 2.5-0.025 MG tablet    lisinopril (ZESTRIL) 10 MG tablet    scopolamine (TRANSDERM) 1 MG/3DAYS 72 hr patch    sildenafil (REVATIO) 20 MG tablet    Vitamin D, Cholecalciferol, 25 MCG (1000 UT) CAPS     No current facility-administered medications for this visit.      Past Medical History:   Patient Active Problem List   Diagnosis    Benign essential hypertension    Coronary artery disease involving native coronary artery of native heart without angina pectoris    Cerebral aneurysm,  nonruptured    Numbness of fingers of both hands    Hypertrophy of both inferior nasal turbinates    Nasal obstruction    Morbid obesity (H)    Bradycardia    Status post coronary angiogram     Past Medical History:   Diagnosis Date    Brain aneurysm     Small    Coronary artery disease     Diverticulosis     Seasonal allergies     Substance dependence, in remission (H) 1993        CC Louis Samuels MD  [address]  [place] [address] on close of this encounter.

## 2023-04-26 NOTE — PATIENT INSTRUCTIONS
Wound Care After a Biopsy    What is a skin biopsy?  A skin biopsy allows the doctor to examine a very small piece of tissue under the microscope to determine the diagnosis and the best treatment for the skin condition. A local anesthetic (numbing medicine)  is injected with a very small needle into the skin area to be tested. A small piece of skin is taken from the area. Sometimes a suture (stitch) is used.     What are the risks of a skin biopsy?  I will experience scar, bleeding, swelling, pain, crusting and redness. I may experience incomplete removal or recurrence. Risks of this procedure are excessive bleeding, bruising, infection, nerve damage, numbness, thick (hypertrophic or keloidal) scar and non-diagnostic biopsy.    How should I care for my wound for the first 24 hours?  Keep the wound dry and covered for 24 hours  If it bleeds, hold direct pressure on the area for 15 minutes. If bleeding does not stop then go to the emergency room  Avoid strenuous exercise the first 1-2 days or as your doctor instructs you    How should I care for the wound after 24 hours?  After 24 hours, remove the bandage  You may bathe or shower as normal  If you had a scalp biopsy, you can shampoo as usual and can use shower water to clean the biopsy site daily  Clean the wound twice a day with gentle soap and water  Do not scrub, be gentle  Apply white petroleum/Vaseline after cleaning the wound with a cotton swab or a clean finger, and keep the site covered with a Bandaid /bandage. Bandages are not necessary with a scalp biopsy  If you are unable to cover the site with a Bandaid /bandage, re-apply ointment 2-3 times a day to keep the site moist. Moisture will help with healing  Avoid strenuous activity for first 1-2 days  Avoid lakes, rivers, pools, and oceans until the stitches are removed or the site is healed    How do I clean my wound?  Wash hands thoroughly with soap or use hand  before all wound care  Clean the  wound with gentle soap and water  Apply white petroleum/Vaseline  to wound after it is clean  Replace the Bandaid /bandage to keep the wound covered for the first few days or as instructed by your doctor  If you had a scalp biopsy, warm shower water to the area on a daily basis should suffice    What should I use to clean my wound?   Cotton-tipped applicators (Qtips )  White petroleum jelly (Vaseline ). Use a clean new container and use Q-tips to apply.  Bandaids   as needed  Gentle soap     How should I care for my wound long term?  Do not get your wound dirty  Keep up with wound care for one week or until the area is healed.  A small scab will form and fall off by itself when the area is completely healed. The area will be red and will become pink in color as it heals. Sun protection is very important for how your scar will turn out. Sunscreen with an SPF 30 or greater is recommended once the area is healed.  You should have some soreness but it should be mild and slowly go away over several days. Talk to your doctor about using tylenol for pain,    When should I call my doctor?  If you have increased:   Pain or swelling  Pus or drainage (clear or slightly yellow drainage is ok)  Temperature over 100F  Spreading redness or warmth around wound    When will I hear about my results?  The biopsy results can take 2 weeks to come back.  Your results will automatically release to [employer] before your provider has even reviewed them.  The clinic will call you with the results, send you a [employer] message, or have you schedule a follow-up clinic or phone time to discuss the results.  Contact our clinics if you do not hear from us in 2 weeks.    Who should I call with questions?  [facility]: [phone]  [facility]: [phone]  For urgent needs outside of business hours call the [facility] at [phone] and ask for the dermatology resident on call      Patient Education     Checking for Skin Cancer  You can find cancer early by checking your skin each month. There are 3 kinds of skin cancer. They are melanoma, basal cell carcinoma, and squamous cell carcinoma. Doing monthly skin checks is the best way to find new marks or skin changes. Follow the instructions below for checking your skin.   The ABCDEs of checking moles for melanoma   Check your moles or growths for signs of melanoma using ABCDE:   Asymmetry: the sides of the mole or growth don t match  Border: the edges are ragged, notched, or blurred  Color: the color within the mole or growth varies  Diameter: the mole or growth is larger than 6 mm (size of a pencil eraser)  Evolving: the size, shape, or color of the mole or growth is changing (evolving is not shown in the images below)    Checking for other types of skin cancer  Basal cell carcinoma or squamous cell carcinoma have symptoms such as:     A spot or mole that looks different from all other marks on your skin  Changes in how an area feels, such as itching, tenderness, or pain  Changes in the skin's surface, such as oozing, bleeding, or scaliness  A sore that does not heal  New swelling or redness beyond the border of a mole    Who s at risk?  Anyone can get skin cancer. But you are at greater risk if you have:   Fair skin, light-colored hair, or light-colored eyes  Many moles or abnormal moles on your skin  A history of sunburns from sunlight or tanning beds  A family history of skin cancer  A history of exposure to radiation or chemicals  A weakened immune system  If you have had skin cancer in the past, you are at risk for recurring skin cancer.   How to check your skin  Do your monthly skin checkups in front of a full-length mirror. Check all parts of your body, including your:   Head (ears, face, neck, and scalp)  Torso (front, back, and sides)  Arms (tops, undersides, upper, and lower armpits)  Hands (palms, backs, and fingers, including  under the nails)  Buttocks and genitals  Legs (front, back, and sides)  Feet (tops, soles, toes, including under the nails, and between toes)  If you have a lot of moles, take digital photos of them each month. Make sure to take photos both up close and from a distance. These can help you see if any moles change over time.   Most skin changes are not cancer. But if you see any changes in your skin, call your doctor right away. Only he or she can diagnose a problem. If you have skin cancer, seeing your doctor can be the first step toward getting the treatment that could save your life.   [employer] last reviewed this educational content on 4/1/2019 2000-2020 [employer]. [address] [place] [address], [place] [address]. All rights reserved. This information is not intended as a substitute for professional medical care. Always follow your healthcare professional's instructions.       When should I call my doctor?  If you are worsening or not improving, please, contact us or seek urgent care as noted below.     Who should I call with questions (adults)?  [facility] (adult and pediatric): [phone]  [facility] (adult): [phone]  For urgent needs outside of business hours call the [facility] at [phone] and ask for the dermatology resident on call to be paged  If this is a medical emergency and you are unable to reach an ER, Call [phone]    Who should I call with questions (pediatric)?  [facility]- Pediatric Dermatology  Dr. Petra Fitzgerald, Dr. Real Sands, Dr. Kinjal Manzano, GURPREET Buchanan, Dr. Nora Gutiérrez, Dr. Ruth Braxton & Dr. Matt Beltran  Non-urgent nurse triage line; [phone]- Mona and Neisha SWANSON Care Coordinatorkeenan Johnston (/Complex ) [phone]    If you need a prescription refill, please contact your pharmacy. Refills are approved or denied by our  Physicians during normal business hours, Monday through Fridays  Per office policy, refills will not be granted if you have not been seen within the past year (or sooner depending on your child's condition)    Scheduling Information:  Pediatric Appointment Scheduling and Call Center [phone]  Radiology Scheduling- [phone]  Sedation Unit Scheduling- [phone]  [place] Scheduling- General [phone]; Pediatric Dermatology [phone]  Main  Services: [phone]  Mohawk: [phone]  Azerbaijani: [phone]  Hmong/Sierra Leonean/French: [phone]  Preadmission Nursing Department Fax Number: [phone] (Fax all pre-operative paperwork to this number)    For urgent matters arising during evenings, weekends, or holidays that cannot wait for normal business hours please call [phone] and ask for the dermatology resident on call to be paged.

## 2023-04-26 NOTE — NURSING NOTE
Lidocaine-epinephrine 1-1:203251 % injection   3mL once for one use, starting 4/26/2023 ending 4/26/2023,  2mL disp, R-0, injection  Injected by Jeanette Montilla RN

## 2023-04-26 NOTE — NURSING NOTE
Dermatology Rooming Note    Lui Arrington's goals for this visit include:   Chief Complaint   Patient presents with     Derm Problem     Adam is here today for a skin check. Lesions of concern on chest     Jeanette Montilla RN

## 2023-04-26 NOTE — TELEPHONE ENCOUNTER
"Spoke with patient regarding referral and scheduling with either plastics provider for:\"Eyelid lesion right medial canthus, monitor vs biopsy, no path\". not urgent.-Per Celeste Fontaine MD. Patient declined scheduling at this time and requesting a call back on late Friday afternoon.-Per Patient   "

## 2023-04-27 ENCOUNTER — TELEPHONE (OUTPATIENT)
Dept: OPHTHALMOLOGY | Facility: CLINIC | Age: 53
End: 2023-04-27
Payer: COMMERCIAL

## 2023-04-27 NOTE — TELEPHONE ENCOUNTER
Patient called back to schedule with Dr. Gale. Scheduled patient accordingly and placed patient on wait list. Sent patient appt info letter.

## 2023-04-27 NOTE — TELEPHONE ENCOUNTER
"LVM regarding scheduling with [facility] for referral \"Eyelid lesion right medial canthus, monitor vs biopsy, no path\" Left direct line as well as clinic number for scheduling.   "

## 2023-05-01 ENCOUNTER — MYC MEDICAL ADVICE (OUTPATIENT)
Dept: DERMATOLOGY | Facility: CLINIC | Age: 53
End: 2023-05-01
Payer: COMMERCIAL

## 2023-05-01 LAB
PATH REPORT.COMMENTS IMP SPEC: ABNORMAL
PATH REPORT.COMMENTS IMP SPEC: ABNORMAL
PATH REPORT.COMMENTS IMP SPEC: YES
PATH REPORT.FINAL DX SPEC: ABNORMAL
PATH REPORT.GROSS SPEC: ABNORMAL
PATH REPORT.MICROSCOPIC SPEC OTHER STN: ABNORMAL
PATH REPORT.RELEVANT HX SPEC: ABNORMAL

## 2023-05-02 ENCOUNTER — VIRTUAL VISIT (OUTPATIENT)
Dept: DERMATOLOGY | Facility: CLINIC | Age: 53
End: 2023-05-02
Payer: COMMERCIAL

## 2023-05-02 DIAGNOSIS — C44.612 BASAL CELL CARCINOMA (BCC) OF SKIN OF RIGHT UPPER EXTREMITY INCLUDING SHOULDER: Primary | ICD-10-CM

## 2023-05-02 DIAGNOSIS — Z86.018 HISTORY OF DYSPLASTIC NEVUS: ICD-10-CM

## 2023-05-02 DIAGNOSIS — Z80.8 FAMILY HISTORY OF MALIGNANT MELANOMA: ICD-10-CM

## 2023-05-02 PROCEDURE — 99213 OFFICE O/P EST LOW 20 MIN: CPT | Mod: 95 | Performed by: DERMATOLOGY

## 2023-05-02 ASSESSMENT — PAIN SCALES - GENERAL: PAINLEVEL: NO PAIN (0)

## 2023-05-02 NOTE — LETTER
2023       RE: Lui Arrington  [address] [place] [address]     Dear Colleague,    Thank you for referring your patient, Lui Arrington, to the [facility] DERMATOLOGY CLINIC [place] at [facility]. Please see a copy of my visit note below.    [facility] Dermatology Note  Encounter Date: May 2, 2023  Telephone ([phone]). Location of teledermatologist: [facility] DERMATOLOGY CLINIC [place]. Start time: 12:18 PM. End time: 12:27 PM.     Dermatology Problem List:  1. BCC, right upper lateral arm, s/p shave bx 2023, pending excision  2. Hx dysplastic nevi  - Compound dysplastic nevus with moderate atypia, central upper back, s/p shave bx 2023; monitor for pigment recurrence  - Compound dysplastic nevus with mild atypia, left chest, s/p shave bx 2023  3. 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 He elects for surgical removal.  - Referral to derm surgery    # Compound dysplastic nevus with moderate atypia, central upper back, s/p shave bx 2023  # Compound dysplastic nevus with mild atypia, left chest, s/p shave bx 2023  # Family hx melanoma  - Reviewed the results of his pathology and recommended regular skin exams  - Advised to monitor for changing, non-healing, bleeding, painful, changing, or otherwise symptomatic lesions      Procedures Performed:   None    Follow-up: 6 month(s) in-person, or earlier for new or changing lesions    Staff and Scribe:     Scribe  Disclosure:  I, Tristan Milly, am serving as a scribe to document services personally performed by Celeste Fontaine MD based on data collection and the provider's statements to me.     Provider Disclosure:   The documentation recorded by the scribe accurately reflects the services I personally performed and the decisions made by me.    Celeste Fontaine MD    Department of Dermatology  [facility]: Phone: [phone], Fax: [phone]  5/2/2023     ____________________________________________    CC: Derm Problem (Discuss treatment option for bcc)    HPI:  Mr. Lui Arrington is a(n) 53 year old male who presents today as a return patient for follow-up. Last seen by me on 4/26/2023, at which time patient underwent shave biopsy x3 on central upper back which returned as compound dysplastic nevus with moderate atypia, right upper lateral arm which returned as BCC, and left chest which returned as compound dysplastic nevus with mild atypia. Additionally, patient was referred to ophthalmology for treatment of NUB at right medial canthus.    Today, he would like to discuss his biopsy results.    Patient is otherwise feeling well, without additional skin concerns.    Labs Reviewed:  N/A    Physical Exam:  Vitals: There were no vitals taken for this visit.  SKIN: No photos submitted.  - No other lesions of concern on areas examined.     Medications:  Current Outpatient Medications   Medication    aspirin 81 MG EC tablet    atorvastatin (LIPITOR) 40 MG tablet    diphenoxylate-atropine (LOMOTIL) 2.5-0.025 MG tablet    lisinopril (ZESTRIL) 10 MG tablet    scopolamine (TRANSDERM) 1 MG/3DAYS 72 hr patch    sildenafil (REVATIO) 20 MG tablet    Vitamin D, Cholecalciferol, 25 MCG (1000 UT) CAPS     No current facility-administered medications for this visit.      Past Medical History:   Patient Active Problem List   Diagnosis     Benign essential hypertension    Coronary artery disease involving native coronary artery of native heart without angina pectoris    Cerebral aneurysm, nonruptured    Numbness of fingers of both hands    Hypertrophy of both inferior nasal turbinates    Nasal obstruction    Morbid obesity (H)    Bradycardia    Status post coronary angiogram     Past Medical History:   Diagnosis Date    Brain aneurysm     Small    Coronary artery disease     Diverticulosis     Seasonal allergies     Substance dependence, in remission (H) 1993        CC No referring provider defined for this encounter. on close of this encounter.

## 2023-05-02 NOTE — PATIENT INSTRUCTIONS
[facility] Dermatology Visit    Thank you for allowing us to participate in your care. Your findings, instructions and follow-up plan are as follows:         When should I call my doctor?  If you are worsening or not improving, please, contact us or seek urgent care as noted below.     Who should I call with questions (adults)?  [facility] (adult and pediatric): [phone]  [facility] (adult): [phone]  For urgent needs outside of business hours call the [facility] at [phone] and ask for the dermatology resident on call  If this is a medical emergency and you are unable to reach an ER, Call [phone]    Who should I call with questions (pediatric)?  [facility]- Pediatric Dermatology  Dr. Petra Fitzgerald, Dr. Real Sands, Dr. Kinjal Manzano, Jamila Otoole, PA  Dr. Nora Gutiérrez, Dr. Ruth Braxton & Dr. Matt Beltran  Non Urgent  Nurse Triage Line; [phone]- Mona and Neisha RN Care Coordinators   Vickie (/Complex ) [phone]    If you need a prescription refill, please contact your pharmacy. Refills are approved or denied by our physicians during normal business hours, Monday through Fridays  Per office policy, refills will not be granted if you have not been seen within the past year (or sooner depending on your child's condition).    Scheduling Information:  Pediatric Appointment Scheduling and Call Center [phone]  Radiology Scheduling- [phone]  Sedation Unit Scheduling- [phone]  [place] Scheduling- General [phone]; Pediatric Dermatology [phone]  Main  Services: [phone]  Mozambican: [phone]  Marshallese: [phone]  Hmong/Panda/South Korean: [phone]  Preadmission Nursing Department Fax Number: [phone] (fax all pre-operative paperwork to this number)    For urgent matters arising during evenings, weekends, or  holidays that cannot wait for normal business hours please call [phone] and ask for the dermatology resident on call to be paged.

## 2023-05-02 NOTE — PROGRESS NOTES
[facility] Dermatology Note  Encounter Date: May 2, 2023  Telephone ([phone]). Location of teledermatologist: [facility] [place]. Start time: 12:18 PM. End time: 12:27 PM.     Dermatology Problem List:  1. BCC, right upper lateral arm, s/p shave bx 2023, pending excision  2. Hx dysplastic nevi  - Compound dysplastic nevus with moderate atypia, central upper back, s/p shave bx 2023; monitor for pigment recurrence  - Compound dysplastic nevus with mild atypia, left chest, s/p shave bx 2023  3. 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 He elects for surgical removal.  - Referral to derm surgery    # Compound dysplastic nevus with moderate atypia, central upper back, s/p shave bx 2023  # Compound dysplastic nevus with mild atypia, left chest, s/p shave bx 2023  # Family hx melanoma  - Reviewed the results of his pathology and recommended regular skin exams  - Advised to monitor for changing, non-healing, bleeding, painful, changing, or otherwise symptomatic lesions      Procedures Performed:   None    Follow-up: 6 month(s) in-person, or earlier for new or changing lesions    Staff and Scribe:     Scribe Disclosure:  I, Tristan Atkins, am serving as a scribe to document services personally performed by Celeste Fontaine MD based on data collection and the provider's statements to me.     Provider Disclosure:   The documentation recorded by the scribe accurately reflects the services I personally performed and the  decisions made by me.    Celeste Fontaine MD    Department of Dermatology  [facility]: Phone: [phone], Fax: [phone]  5/2/2023     ____________________________________________    CC: Derm Problem (Discuss treatment option for bcc)    HPI:  Mr. Lui Arrington is a(n) 53 year old male who presents today as a return patient for follow-up. Last seen by me on 4/26/2023, at which time patient underwent shave biopsy x3 on central upper back which returned as compound dysplastic nevus with moderate atypia, right upper lateral arm which returned as BCC, and left chest which returned as compound dysplastic nevus with mild atypia. Additionally, patient was referred to ophthalmology for treatment of NUB at right medial canthus.    Today, he would like to discuss his biopsy results.    Patient is otherwise feeling well, without additional skin concerns.    Labs Reviewed:  N/A    Physical Exam:  Vitals: There were no vitals taken for this visit.  SKIN: No photos submitted.  - No other lesions of concern on areas examined.     Medications:  Current Outpatient Medications   Medication     aspirin 81 MG EC tablet     atorvastatin (LIPITOR) 40 MG tablet     diphenoxylate-atropine (LOMOTIL) 2.5-0.025 MG tablet     lisinopril (ZESTRIL) 10 MG tablet     scopolamine (TRANSDERM) 1 MG/3DAYS 72 hr patch     sildenafil (REVATIO) 20 MG tablet     Vitamin D, Cholecalciferol, 25 MCG (1000 UT) CAPS     No current facility-administered medications for this visit.      Past Medical History:   Patient Active Problem List   Diagnosis     Benign essential hypertension     Coronary artery disease involving native coronary artery of native heart without angina pectoris     Cerebral aneurysm, nonruptured     Numbness of fingers of both hands     Hypertrophy of both inferior nasal turbinates     Nasal obstruction     Morbid obesity (H)      Bradycardia     Status post coronary angiogram     Past Medical History:   Diagnosis Date     Brain aneurysm     Small     Coronary artery disease      Diverticulosis      Seasonal allergies      Substance dependence, in remission (H) 1993        CC No referring provider defined for this encounter. on close of this encounter.

## 2023-05-04 ENCOUNTER — TELEPHONE (OUTPATIENT)
Dept: DERMATOLOGY | Facility: CLINIC | Age: 53
End: 2023-05-04
Payer: COMMERCIAL

## 2023-05-04 NOTE — TELEPHONE ENCOUNTER
Left patient a voicemail to schedule a consult & mohs for excision of sup bcc on right upper lateral arm with Dr. Vincent. Provided my direct phone number.    Adriane Mccormick on 5/4/2023 at 10:12 AM

## 2023-05-04 NOTE — TELEPHONE ENCOUNTER
Meseret. 1st attempt informing patient to call [phone] to schedule 6m  follow up with Dr. Fontaine  in Dermatology.

## 2023-05-08 ENCOUNTER — PRE VISIT (OUTPATIENT)
Dept: OPHTHALMOLOGY | Facility: CLINIC | Age: 53
End: 2023-05-08

## 2023-05-08 ENCOUNTER — OFFICE VISIT (OUTPATIENT)
Dept: CARDIOLOGY | Facility: CLINIC | Age: 53
End: 2023-05-08
Attending: NURSE PRACTITIONER
Payer: COMMERCIAL

## 2023-05-08 ENCOUNTER — OFFICE VISIT (OUTPATIENT)
Dept: OPHTHALMOLOGY | Facility: CLINIC | Age: 53
End: 2023-05-08
Payer: COMMERCIAL

## 2023-05-08 VITALS
BODY MASS INDEX: 37.35 KG/M2 | HEART RATE: 51 BPM | OXYGEN SATURATION: 99 % | WEIGHT: 291 LBS | SYSTOLIC BLOOD PRESSURE: 116 MMHG | HEIGHT: 74 IN | DIASTOLIC BLOOD PRESSURE: 75 MMHG

## 2023-05-08 DIAGNOSIS — H02.9 EYELID LESION: Primary | ICD-10-CM

## 2023-05-08 DIAGNOSIS — I25.10 CORONARY ARTERY DISEASE INVOLVING NATIVE CORONARY ARTERY OF NATIVE HEART WITHOUT ANGINA PECTORIS: Primary | ICD-10-CM

## 2023-05-08 DIAGNOSIS — I10 BENIGN ESSENTIAL HYPERTENSION: ICD-10-CM

## 2023-05-08 DIAGNOSIS — R06.83 SNORING: ICD-10-CM

## 2023-05-08 PROCEDURE — G0463 HOSPITAL OUTPT CLINIC VISIT: HCPCS | Performed by: NURSE PRACTITIONER

## 2023-05-08 PROCEDURE — 99214 OFFICE O/P EST MOD 30 MIN: CPT | Performed by: NURSE PRACTITIONER

## 2023-05-08 PROCEDURE — 99203 OFFICE O/P NEW LOW 30 MIN: CPT | Mod: GC | Performed by: OPHTHALMOLOGY

## 2023-05-08 PROCEDURE — 92285 EXTERNAL OCULAR PHOTOGRAPHY: CPT | Mod: GC | Performed by: OPHTHALMOLOGY

## 2023-05-08 ASSESSMENT — PAIN SCALES - GENERAL: PAINLEVEL: NO PAIN (0)

## 2023-05-08 ASSESSMENT — VISUAL ACUITY
OD_CC+: -2
OD_CC: 20/20
CORRECTION_TYPE: GLASSES
OS_CC: 20/20
METHOD: SNELLEN - LINEAR

## 2023-05-08 ASSESSMENT — TONOMETRY
OS_IOP_MMHG: 10
OD_IOP_MMHG: 10
IOP_METHOD: ICARE

## 2023-05-08 ASSESSMENT — CONF VISUAL FIELD
OS_SUPERIOR_TEMPORAL_RESTRICTION: 0
OS_INFERIOR_NASAL_RESTRICTION: 0
METHOD: COUNTING FINGERS
OD_SUPERIOR_NASAL_RESTRICTION: 0
OD_NORMAL: 1
OD_INFERIOR_NASAL_RESTRICTION: 0
OD_SUPERIOR_TEMPORAL_RESTRICTION: 0
OD_INFERIOR_TEMPORAL_RESTRICTION: 0
OS_INFERIOR_TEMPORAL_RESTRICTION: 0
OS_NORMAL: 1
OS_SUPERIOR_NASAL_RESTRICTION: 0

## 2023-05-08 ASSESSMENT — SLIT LAMP EXAM - LIDS: COMMENTS: NORMAL

## 2023-05-08 NOTE — LETTER
5/8/2023      RE: Lui Arrington  [address] [place] [address]       Dear Colleague,    Thank you for the opportunity to participate in the care of your patient, Lui Arrington, at the [facility] [place] [facility]. Please see a copy of my visit note below.    Cardiology Clinic Note  May 8, 2023      HPI:  Lui Arrington is a 53 year old who presents for coronary angiogram follow-up.     Patient has a history of brain aneurysm, carpal tunnel, HTN and CAD. He was seen by Dr. Morales in 2018 after an episode of chest pain for which he ended up having an exercise stress echo and a coronary CT angiogram. The functional test did not reveal any ischemia but the CTA showed moderate-severe disease of the LAD. He was asymptomatic and therefore medical management was recommended. He was recently seen in clinic and reported exertional chest tightness. He was referred for invasive coronary angiogram which showed mild 30% stenosis of the LAD, otherwise normal coronary arteries.  Cardiac MRI was also obtained demonstrating normal LV/RV functional without LGE, no prior MI, fibrosis or infiltrative disease, moderate to severely enlarged bilateral atria, mildly dilated aortic root 4.1. He was continued on medical therapy with aspirin and high intensity statin.    Mr. Arrington reports feeling fairly well.  He has had no complications after his coronary angiogram.  His femoral puncture site is healing well.  He is active without cardiac symptoms.  His home blood pressure has been 120-130/75-85. He is compliant with his medications.     Past medical history:  Past Medical History:   Diagnosis Date    Brain aneurysm     Small    Coronary artery disease     Diverticulosis     Seasonal allergies     Substance dependence, in remission (H) 1993       Medications:  aspirin 81 MG EC tablet, Take 1 tablet (81 mg) by mouth daily  atorvastatin (LIPITOR)  40 MG tablet, TAKE 1 TABLET (40 MG) BY MOUTH EVERY EVENING TIME FOR A FOLLOW UP VISIT WITH LABS!  diphenoxylate-atropine (LOMOTIL) 2.5-0.025 MG tablet, Take 1 tablet by mouth 4 times daily as needed for diarrhea  lisinopril (ZESTRIL) 10 MG tablet, TAKE 2 TABLETS BY MOUTH EVERY DAY  scopolamine (TRANSDERM) 1 MG/3DAYS 72 hr patch, Place 1 patch onto the skin every 72 hours  sildenafil (REVATIO) 20 MG tablet, Take 1 tablet (20 mg) by mouth as needed (ED)  Vitamin D, Cholecalciferol, 25 MCG (1000 UT) CAPS,     No current facility-administered medications on file prior to visit.      Allergies:      Allergies   Allergen Reactions    Ciprofloxacin Hives    Erythromycin     Seasonal Allergies        Family and social history:    Family History   Problem Relation Age of Onset    Cancer Mother     Cancer - colorectal Mother     Hypertension Mother     Crohn's Disease Mother     Cancer Father     Melanoma Father     Diabetes Maternal Grandmother     C.A.D. Maternal Grandmother     Melanoma Maternal Grandfather     C.A.D. Paternal Grandfather     Hypertension Brother     Kidney Cancer Brother        Social History     Socioeconomic History    Marital status:      Spouse name: Not on file    Number of children: Not on file    Years of education: Not on file    Highest education level: Not on file   Occupational History    Not on file   Tobacco Use    Smoking status: Former     Types: Cigarettes     Quit date: 1998     Years since quittin.3    Smokeless tobacco: Never   Vaping Use    Vaping status: Never Used   Substance and Sexual Activity    Alcohol use: No    Drug use: No     Comment: in remission , marijuana, ETOH, narcotic (no IV use)    Sexual activity: Yes     Partners: Female   Other Topics Concern    Parent/sibling w/ CABG, MI or angioplasty before 65F 55M? Not Asked   Social History Narrative    Self-employed, ethics and leadership education. , has 4 yo son plus two children from previous  "marriage. Non-smoker. No alcohol use (abstains due to remote hx of alcoholism), no other drug use.      Social Determinants of Health     Financial Resource Strain: Not on file   Food Insecurity: Not on file   Transportation Needs: Not on file   Physical Activity: Not on file   Stress: Not on file   Social Connections: Not on file   Intimate Partner Violence: Not on file   Housing Stability: Not on file     Review of Systems:  Skin: No skin rash or ulcers.  Respiratory: No cough or hemoptysis.  Cardiovascular: See HPI.    Gastrointestinal: No abdominal pain, nausea, vomiting, hematemesis or melena.  Genitourinary: No increased frequency or urgency of urine. No dysuria or hematuria.  Musculoskeletal: No polyarthralgia or myalgias.  Neurologic: No headaches, seizure or focal weakness.  Hematologic/Lymphatic/Immunologic: No bleeding tendency.    Vital signs:  /75 (BP Location: Right arm, Patient Position: Sitting, Cuff Size: Adult Large)   Pulse 51   Ht 1.88 m (6' 2\")   Wt 132 kg (291 lb)   SpO2 99%   BMI 37.36 kg/m     Wt Readings from Last 2 Encounters:   04/21/23 131.7 kg (290 lb 5.5 oz)   01/24/23 132.5 kg (292 lb)     Physical Exam:  Gen: NAD.    HEENT: No conjunctival pallor or scleral icterus, MMM. Clear oropharynx.    Neck: No JVD. No thyroid enlargement or cervical adenopathy.    Chest: Clear to auscultation bilaterally.    CV: Normal first and second heart sounds. No murmurs or gallop appreciated.    Abdomen: Soft, non-tender, non-distended, BS+.  Ext: No edema. Warm and well perfused with normal capillary refill.    Skin: No skin rash or ulcers.  Neuro: alert, oriented and appropriately conversant.    Psych: Normal affect and speech.    Labs:  Last Basic Metabolic Panel:  Lab Results   Component Value Date     04/21/2023    .4 12/11/2019      Lab Results   Component Value Date    POTASSIUM 4.8 04/21/2023    POTASSIUM 4.2 12/11/2019     Lab Results   Component Value Date    CHLORIDE 104 " 04/21/2023    CHLORIDE 99.4 12/11/2019     Lab Results   Component Value Date    MARIO 9.4 04/21/2023    MARIO 9.7 12/11/2019     Lab Results   Component Value Date    CO2 25 04/21/2023    CO2 27.3 12/11/2019     Lab Results   Component Value Date    BUN 15.5 04/21/2023    BUN 14.4 12/11/2019     Lab Results   Component Value Date    CR 0.97 04/21/2023    CR 0.9 12/11/2019     Lab Results   Component Value Date     04/21/2023    .0 12/11/2019       Lab Results   Component Value Date    WBC 6.5 04/21/2023    WBC 6.6 09/03/2018     Lab Results   Component Value Date    RBC 5.31 04/21/2023    RBC 5.38 09/03/2018     Lab Results   Component Value Date    HGB 14.3 04/21/2023    HGB 15.2 09/03/2018     Lab Results   Component Value Date    HCT 46.6 04/21/2023    HCT 45.6 09/03/2018     Lab Results   Component Value Date    MCV 88 04/21/2023    MCV 85 09/03/2018     Lab Results   Component Value Date    MCH 26.9 04/21/2023    MCH 28.3 09/03/2018     Lab Results   Component Value Date    MCHC 30.7 04/21/2023    MCHC 33.3 09/03/2018     Lab Results   Component Value Date    RDW 13.8 04/21/2023    RDW 13.7 09/03/2018     Lab Results   Component Value Date     04/21/2023     09/03/2018       Lab Results   Component Value Date    INR 0.86 04/21/2023    INR 0.92 09/03/2018       Diagnostics:    Coronary angiogram:  Lui Arrington is a 52 year old male  with pmh notable for CAD, and small brain aneurysm for paresthesias found to have carpal tunnel syndrome here for coronary angiogram as workup for chest discomfort.     Pre Procedure Diagnosis    suspected CAD       Conclusion         Prox LAD to Mid LAD lesion is 30% stenosed.     Right dominant coronary circulation.  Mild diffuse disease.  Presence of a non obstructive 30% lesion of the primal-mid LAD.         Plan     Follow bedrest per protocol   Continued medical management and lifestyle modifications for cardiovascular risk factor  optimizations.   Follow up with Dr. Morales.   Discharge today per protocol     Recommendations    General Recommendations:  - Recommended follow up with doctor Dr. Morales.     Coronary Findings    Diagnostic  Dominance: Right  Left Anterior Descending   Prox LAD to Mid LAD lesion is 30% stenosed.         Intervention     No interventions have been documented.       Assessment and Plan:  This is a 53 year old with     CAD, 30% prox to mid LAD:  - Asymptomatic  - Continue ASA and high intensity statin therapy     HTN:   - At goal, continue lisinopril 20 mg daily     Mild dilated aorta, 4.1 cm (4/2023):  - TTE in one year     Risk of sleep apnea:  - Sleep study referral     Follow-up: RTC in 1 year.      A total of 30 minutes spent face to face with patient and > 50% of the time spent counseling and coordinating care.         Please do not hesitate to contact me if you have any questions/concerns.     Sincerely,     Raisa Ponce NP

## 2023-05-08 NOTE — NURSING NOTE
Chief Complaint   Patient presents with     Follow Up     [facility] follow up       Vitals were taken, medications reconciled.    Jeanette Baptiste, EMT   8:06 AM

## 2023-05-08 NOTE — TELEPHONE ENCOUNTER
FUTURE VISIT INFORMATION      FUTURE VISIT INFORMATION:    Date: 5/8/23    Time: 9:00am    Location: [facility]  REFERRAL INFORMATION:    Referring provider:  Celeste Fontaine MD    Referring providers clinic:  [facility]    Reason for visit/diagnosis  Eyelid lesion right medial canthus, monitor vs biopsy, no path    RECORDS REQUESTED FROM:       Clinic name Comments Records Status Imaging Status   [facility] OV/referral 4/26/23 EPIC

## 2023-05-08 NOTE — LETTER
2023         RE:  :  MRN: Lui Arrington  1970  [id number]     Dear Dr. Fontaine,     Thank you for asking me to see your patient, Lui Arrington, for an oculoplastic   consultation.  My assessment and plan are below.  For further details, please see my attached clinic note.      Assessment & Plan     Lui Arrington is a 53 year old male with the following diagnoses:   1. Eyelid lesion         Right lower eyelid lesion  - favors apocrine hydrocystoma    Right upper eyelid lesion  - ddx inclusion cyst vs xanthelasma    Pt would like to schedule excision of both lesions on a separate day          Again, thank you for allowing me to participate in the care of your patient.      Sincerely,    Karthik Gale MD  Department of Ophthalmology and Visual Neurosciences  [facility]    CC: Celeste Fontaine MD   Dermatology  [id number]  [place] [id number]  Via In Basket

## 2023-05-08 NOTE — PATIENT INSTRUCTIONS
You were seen today in the Cardiovascular Clinic at the [facility].    Cardiology provider you saw during your visit Raisa Ponce NP.    Continue ASA and statin.   2. Recommend sleep study to evaluate for NYLA.   3. Recommend aggressive BP control < 130/80 and treatment of NYLA if abnormal study to help reduce progression of dilated aorta.   4. Please make a follow-up appointment in 1 year with echo prior to visit.   5. In the meantime, continue to work on diet and exercise, call if you need a weight management referral.     Questions and [phone].   First press #1 for the University and then press #3 for Medical Questions to reach the Cardiology triage nurse.     On Call Cardiologist for after hours or on weekends: [phone], press option #4 and ask to speak to the on-call Cardiologist.

## 2023-05-08 NOTE — PROGRESS NOTES
Chief Complaints and History of Present Illnesses   Patient presents with     Eyelid Cyst Evaluation     Chief Complaint(s) and History of Present Illness(es)     Eyelid Cyst Evaluation    In right upper lid.  Associated symptoms include Negative for blurred   vision, tearing, eye pain and bleeding.  Pain was noted as 0/10.           Comments    Patient states lesion lesion has been present 2 years. Patient denies   changes in size shape or color of lesion.  JEMMA Diaz May 8, 2023 9:09 AM         Referred by Dr. Fontaine for lesion eval     2 year history of RLL lesion, not changing in size. 6 month history RUL lesion. No change in size, bleeding, crusting, ulcerations.    New hx of BCC - arm  Family history of metastatic melanoma         Assessment & Plan     Lui Arrington is a 53 year old male with the following diagnoses:   1. Eyelid lesion         Right lower eyelid lesion  - favors apocrine hydrocystoma    Right upper eyelid lesion  - ddx inclusion cyst vs xanthelasma    Pt would like to schedule excision of both lesions on a separate day            Hortencia Mccray MD  Oculoplastic Surgery Fellow    Attending Physician Attestation:  I have seen and examined this patient with the fellow .  I have confirmed and edited as necessary the chief complaint(s), history of present illness, review of systems, relevant history, and examination findings as documented by others.  I have personally reviewed the relevant tests, images, and reports as documented above.  I have confirmed and edited as necessary the assessment and plan and agree with this note.    - Karthik Gale MD 10:14 AM 5/8/2023

## 2023-05-09 ENCOUNTER — TELEPHONE (OUTPATIENT)
Dept: OPHTHALMOLOGY | Facility: CLINIC | Age: 53
End: 2023-05-09
Payer: COMMERCIAL

## 2023-05-09 NOTE — TELEPHONE ENCOUNTER
Spoke with patient regarding scheduling an In-Cinic Procedure-Return in about 1 month (around 6/8/2023) for PROCEDURE- RUL and RLL lesion excision with . Scheduled patient accordingly and patient will see appointment in [facility].-Per Patient

## 2023-05-16 NOTE — TELEPHONE ENCOUNTER
Left patient a voicemail to schedule a consult & mohs for excision of sup bcc on right upper lateral arm with Dr. Vincent. Provided my direct phone number.    Adriane Mccormick, Procedure  5/16/2023 2:54 PM

## 2023-05-25 NOTE — TELEPHONE ENCOUNTER
Left patient a voicemail to schedule a consult & mohs for excision of sup bcc on right upper lateral arm with Dr. Vincent. Provided my direct phone number.    Adriane Mccormick, Procedure  5/25/2023 9:19 AM

## 2023-05-31 NOTE — TELEPHONE ENCOUNTER
I have not been able to reach pt to schedule. I am canceling the order from my WQ. If the pt calls back, I will reinstate the order and help them schedule.     Thanks!  Adriane Mccormick, Procedure  5/31/2023 4:00 PM

## 2023-06-05 ENCOUNTER — OFFICE VISIT (OUTPATIENT)
Dept: OPHTHALMOLOGY | Facility: CLINIC | Age: 53
End: 2023-06-05
Payer: COMMERCIAL

## 2023-06-05 DIAGNOSIS — H02.9 EYELID LESION: Primary | ICD-10-CM

## 2023-06-05 PROCEDURE — 67840 REMOVE EYELID LESION: CPT | Mod: E3 | Performed by: OPHTHALMOLOGY

## 2023-06-05 PROCEDURE — 88305 TISSUE EXAM BY PATHOLOGIST: CPT | Mod: 26 | Performed by: OPHTHALMOLOGY

## 2023-06-05 PROCEDURE — 99207 PR NO CHARGE LOS: CPT | Performed by: OPHTHALMOLOGY

## 2023-06-05 PROCEDURE — 88305 TISSUE EXAM BY PATHOLOGIST: CPT | Mod: TC | Performed by: OPHTHALMOLOGY

## 2023-06-05 RX ORDER — ERYTHROMYCIN 5 MG/G
OINTMENT OPHTHALMIC ONCE
Status: SHIPPED | OUTPATIENT
Start: 2023-06-05

## 2023-06-05 NOTE — PROGRESS NOTES
Here for procedure today.            Assessment & Plan     Lui Arrington is a 53 year old male with the following diagnoses:   1. Eyelid lesion         Excise lesions today.            Attending Physician Attestation:  Complete documentation of historical and exam elements from today's encounter can be found in the full encounter summary report (not reduplicated in this progress note).  I personally obtained the chief complaint(s) and history of present illness.  I confirmed and edited as necessary the review of systems, past medical/surgical history, family history, social history, and examination findings as documented by others; and I examined the patient myself.  I personally reviewed the relevant tests, images, and reports as documented above.  I formulated and edited as necessary the assessment and plan and discussed the findings and management plan with the patient and family.   12:05 PM 6/5/2023    Today with Lui Arrington, I reviewed the indications, risks, benefits, and alternatives of the proposed surgical procedure including, but not limited to, failure obtain the desired result  and need for additional surgery, bleeding, infection, loss of vision, loss of the eye, and the remote possibility of permanent damage to any organ system or death with the use of anesthesia.  I provided multiple opportunities for the questions, answered all questions to the best of my ability, and confirmed that my answers and my discussion were understood.     - Karthik Gale MD 12:06 PM 6/5/2023

## 2023-06-05 NOTE — PATIENT INSTRUCTIONS
Use erythromycin antibiotic ointment 3 times a day until the tube runs out.      Start icing for the next 48-72 hours when you get home

## 2023-06-08 ENCOUNTER — TELEPHONE (OUTPATIENT)
Dept: DERMATOLOGY | Facility: CLINIC | Age: 53
End: 2023-06-08
Payer: COMMERCIAL

## 2023-06-08 LAB
PATH REPORT.COMMENTS IMP SPEC: NORMAL
PATH REPORT.COMMENTS IMP SPEC: NORMAL
PATH REPORT.FINAL DX SPEC: NORMAL
PATH REPORT.GROSS SPEC: NORMAL
PATH REPORT.MICROSCOPIC SPEC OTHER STN: NORMAL
PATH REPORT.RELEVANT HX SPEC: NORMAL
PHOTO IMAGE: NORMAL

## 2023-06-08 NOTE — TELEPHONE ENCOUNTER
[facility] Call Center    Phone Message    May a detailed message be left on voicemail: yes     Reason for Call: Other: Patient calling to schedule excision. Please call back [phone]     Action Taken: Message routed to:  [facility] Derm Surg    Travel Screening: Not Applicable

## 2023-06-08 NOTE — TELEPHONE ENCOUNTER
Attempted to call pt to discuss best time for him to be called so he can schedule surgery. No answer. LVM to call back. Also sent him a Mychart message.   Jeanette Montilla RN

## 2023-06-12 NOTE — TELEPHONE ENCOUNTER
Called patient to schedule surgery with Dr. Vincent    Date of Surgery: 10/03    Surgery type: excision    Consult scheduled: Not Applicable    Has patient had mohs with us before? Not Applicable    Additional comments: pt would like a My Chart with wound care instructions please. Thanks!        Adriane Mccormick on 6/12/2023 at 3:14 PM

## 2023-06-14 DIAGNOSIS — I25.10 CORONARY ARTERY DISEASE INVOLVING NATIVE CORONARY ARTERY OF NATIVE HEART WITHOUT ANGINA PECTORIS: ICD-10-CM

## 2023-06-14 DIAGNOSIS — I10 BENIGN ESSENTIAL HYPERTENSION: ICD-10-CM

## 2023-06-14 DIAGNOSIS — E66.01 MORBID OBESITY (H): Primary | ICD-10-CM

## 2023-07-21 DIAGNOSIS — E78.49 OTHER HYPERLIPIDEMIA: ICD-10-CM

## 2023-07-21 RX ORDER — ATORVASTATIN CALCIUM 40 MG/1
TABLET, FILM COATED ORAL
Qty: 90 TABLET | Refills: 0 | Status: SHIPPED | OUTPATIENT
Start: 2023-07-21 | End: 2023-10-24

## 2023-07-21 NOTE — TELEPHONE ENCOUNTER
"Request for medication refill:  atorvastatin (LIPITOR) 40 MG tablet  Providers if patient needs an appointment and you are willing to give a one month supply please refill for one month and  send a letter/MyChart using \".SMILLIMITEDREFILL\" .smillimited and route chart to \"P SMI \" (Giving one month refill in non controlled medications is strongly recommended before denial)    If refill has been denied, meaning absolutely no refills without visit, please complete the smart phrase \".smirxrefuse\" and route it to the \"P SMI MED REFILLS\"  pool to inform the patient and the pharmacy.    Nano Ochoa MA        "

## 2023-07-28 ENCOUNTER — OFFICE VISIT (OUTPATIENT)
Dept: FAMILY MEDICINE | Facility: CLINIC | Age: 53
End: 2023-07-28
Payer: COMMERCIAL

## 2023-07-28 VITALS
RESPIRATION RATE: 17 BRPM | DIASTOLIC BLOOD PRESSURE: 83 MMHG | HEART RATE: 49 BPM | WEIGHT: 292 LBS | BODY MASS INDEX: 37.49 KG/M2 | SYSTOLIC BLOOD PRESSURE: 132 MMHG | OXYGEN SATURATION: 98 % | TEMPERATURE: 98.1 F

## 2023-07-28 DIAGNOSIS — R00.1 BRADYCARDIA: ICD-10-CM

## 2023-07-28 DIAGNOSIS — H60.391 INFECTIVE OTITIS EXTERNA, RIGHT: Primary | ICD-10-CM

## 2023-07-28 DIAGNOSIS — I25.10 CORONARY ARTERY DISEASE INVOLVING NATIVE CORONARY ARTERY OF NATIVE HEART WITHOUT ANGINA PECTORIS: ICD-10-CM

## 2023-07-28 PROCEDURE — 99214 OFFICE O/P EST MOD 30 MIN: CPT | Mod: GC

## 2023-07-28 RX ORDER — NEOMYCIN SULFATE, POLYMYXIN B SULFATE, HYDROCORTISONE 3.5; 10000; 1 MG/ML; [USP'U]/ML; MG/ML
3 SOLUTION/ DROPS AURICULAR (OTIC) 4 TIMES DAILY
Qty: 10 ML | Refills: 0 | Status: SHIPPED | OUTPATIENT
Start: 2023-07-28 | End: 2023-09-12

## 2023-07-28 NOTE — PROGRESS NOTES
Assessment & Plan     Otitis externa, right  Patient with two-days of improving URI symptoms including cough productive of clear sputum, mild sore throat and nasal congestion. Presents with 1-day history of ear pain without drainage and associated tinnitus. No fevers, vertigo, vomiting. No recent swimming, ear trauma/insertion, or new products. Afebrile, HDS. Exam with erythema, tenderness and mild edema of right ear canal. TM normal. No tenderness to pinna/tragus or mastoid. HEENT/lungs otherwise unremarkable. Exam suspicious for otitis externa, though unclear trigger. Less likely vestibular neuritis without vertigo; otomyocosis without discharge; otitis media with normal TM; contact dermatitis without consistent history. Will start topical antibiotics and strict return precautions. Ciprofloxacin allergy.   - Start neomycin-polymyxin-hydrocortisone (CORTISPORIN)        [id number] otic solution  - Reviewed return precautions including worsening pain, fever, mastoid tenderness, vertigo and other new/concerning symptoms   - If not improved in 1 week, return for reevaluation     Bradycardia  CAD  Vitals significant for bradycardia. Stable from previous. Known history, previously worked up by Cardiology.     Lorraine Tinajero MD  [facility] KRUPA Medina is a 53 year old, presenting for the following health issues:      HPI     Ear pain:  - Started with URI symptoms two days ago: Cough, sore throat, nasal congestion   - Cough became productive, clear sputum   - Yesterday developed ear pain and constant tinnitus, right side  - Maybe some sinus fullness, but not pain    - No fevers, chills, CP, SOB, abdominal pain, vomiting, diarrhea  - No sick contacts  - Traveled to [facility]   - No asthma, COPD. Former smoker in 20's   - No recent swimming, ear insertion, ear injury   - Had ear infections as a child and tubes x2, not as adult   - Tried ibuprofen 600 mg QID   - Other cold symptoms are  getting better, but ear pain is bothering him   - No new medications recently   - No ear drainage, vertigo, tinnitus     Heart rate:  - Has always been bradycardic per report   - Sees Cardiologist for CAD, no concerns with bradycardia     Review of Systems   Constitutional:  Negative for chills and fever.   HENT:  Positive for ear pain. Negative for ear discharge and sinus pain.    Respiratory:  Negative for shortness of breath.    Cardiovascular:  Negative for chest pain.   Gastrointestinal:  Negative for abdominal pain, diarrhea and vomiting.          Objective    /83 (BP Location: Left arm, Patient Position: Sitting, Cuff Size: Adult Large)   Pulse (!) 49   Temp 98.1  F (36.7  C)   Resp 17   Wt 132.5 kg (292 lb)   SpO2 98%   BMI 37.49 kg/m    Body mass index is 37.49 kg/m .  Physical Exam  Vitals reviewed.   Constitutional:       General: He is not in acute distress.     Appearance: Normal appearance.   HENT:      Head: Normocephalic and atraumatic.      Right Ear: Tympanic membrane normal.      Left Ear: Tympanic membrane, ear canal and external ear normal.      Ears:      Comments: Right ear canal with erythema, tenderness, and mild edema. No tenderness to palpation of pinna or tragus. No mastoid tenderness to palpation.      Mouth/Throat:      Mouth: Mucous membranes are moist.      Pharynx: No oropharyngeal exudate or posterior oropharyngeal erythema.   Eyes:      General:         Right eye: No discharge.         Left eye: No discharge.      Extraocular Movements: Extraocular movements intact.      Pupils: Pupils are equal, round, and reactive to light.   Cardiovascular:      Rate and Rhythm: Normal rate and regular rhythm.      Heart sounds: No murmur heard.  Pulmonary:      Effort: Pulmonary effort is normal. No respiratory distress.      Breath sounds: No wheezing.   Abdominal:      Palpations: Abdomen is soft.      Tenderness: There is no abdominal tenderness.   Musculoskeletal:      Cervical  back: No rigidity.      Right lower leg: No edema.      Left lower leg: No edema.   Skin:     General: Skin is warm and dry.      Capillary Refill: Capillary refill takes less than 2 seconds.   Neurological:      General: No focal deficit present.      Mental Status: He is alert.   Psychiatric:         Mood and Affect: Mood normal.         Behavior: Behavior normal.        ----- Service Performed and Documented by Resident or Fellow ------

## 2023-07-30 ASSESSMENT — ENCOUNTER SYMPTOMS
VOMITING: 0
FEVER: 0
DIARRHEA: 0
SINUS PAIN: 0
ABDOMINAL PAIN: 0
CHILLS: 0
SHORTNESS OF BREATH: 0

## 2023-07-31 ENCOUNTER — OFFICE VISIT (OUTPATIENT)
Dept: FAMILY MEDICINE | Facility: CLINIC | Age: 53
End: 2023-07-31
Payer: COMMERCIAL

## 2023-07-31 VITALS
SYSTOLIC BLOOD PRESSURE: 129 MMHG | OXYGEN SATURATION: 98 % | RESPIRATION RATE: 17 BRPM | HEART RATE: 55 BPM | TEMPERATURE: 98.5 F | DIASTOLIC BLOOD PRESSURE: 82 MMHG

## 2023-07-31 DIAGNOSIS — J34.89 SINUS PRESSURE: ICD-10-CM

## 2023-07-31 DIAGNOSIS — R05.8 PRODUCTIVE COUGH: ICD-10-CM

## 2023-07-31 DIAGNOSIS — J06.9 UPPER RESPIRATORY INFECTION, VIRAL: Primary | ICD-10-CM

## 2023-07-31 DIAGNOSIS — H93.11 TINNITUS, RIGHT: ICD-10-CM

## 2023-07-31 PROCEDURE — 99213 OFFICE O/P EST LOW 20 MIN: CPT | Mod: GC | Performed by: STUDENT IN AN ORGANIZED HEALTH CARE EDUCATION/TRAINING PROGRAM

## 2023-07-31 NOTE — PATIENT INSTRUCTIONS
Patient Education     Thank you for coming to [facility] today!  Here is the plan from today's visit:    Continue as needed medications.   - can also start taking tylenol as needed  - message if you develop fever, or if symptoms still don't get any better after 10 days  - call/return if you have worsening SOB, chest pain, one leg swelling/pain      Follow up plan  Return if symptoms worsen or fail to improve.    Lab Testing:  **If you had lab testing today and your results are reassuring or normal they will be mailed to you or sent through [employer] within 7 days.   **If the lab tests need quick action we will call you with the results.  **If you are having labs done on a different day, please call [phone] to schedule at [facility] or [phone] for other [facility] Outpatient Lab locations. Labs do not offer walk-in appointments.  The phone number we will call with results is # [phone] (home) [phone] (work). If this is not the best number please call our clinic and change the number.  Medication Refills:  If you need any refills please call your pharmacy and they will contact us.   If you need to  your refill at a new pharmacy, please contact the new pharmacy directly. The new pharmacy will help you get your medications transferred faster.   Scheduling:  If you have any concerns about today's visit or wish to schedule another appointment please call our office during normal business hours [phone] (8-5:00 M-F)  If a referral was made to an [facility] specialty provider and you do not get a call from central scheduling, please refer to directions on your visit summary or call our office during normal business hours for assistance.   If a Mammogram was ordered for you at the [facility] call [phone] to schedule or change your appointment.  If you had an XRay/CT/Ultrasound/MRI ordered the number is [phone] to schedule or change your radiology appointment.    [facility] has limited ultrasound appointments available on Wednesdays, if you would like your ultrasound at [facility], please call [phone] to schedule.   Medical Concerns:  If you have urgent medical concerns please call [phone] at any time of the day.    Kathy Trent MD

## 2023-07-31 NOTE — PROGRESS NOTES
Assessment & Plan     Lui was seen today for sinus problem.    Diagnoses and all orders for this visit:    Upper respiratory infection, viral  Tinnitus, right  Sinus pressure  Productive cough  5-6 days of URI with congestion, sore throat, headache, subjective fever, and productive cough, worsening over the past 2 days. Presented 3 days ago for same symptoms with right ear pain and tinnitus, and was prescribed antibiotic ear drops for erythema in right canal. His right ear pain resolved. Canals and TMs are normal today. Well appearing, and afebrile. Normal vitals, no hypoxemia, and clear pulmonary exam. No concern for pneumonia. Discussed suspected viral nature, and would consider treating with antibiotics if he develops fever in setting of worsening symptoms, or if symptoms persist for at least 10 days without improvement.   - continue conservative management with OTC NSAIDs and tylenol  - discussed GI risks with NSAID      Return if symptoms worsen or fail to improve.     Kathy Trent MD  [facility] KRUPA Medina is a 53 year old who is new to me with PMH of HTN, elevated BMI, hypertrophic nasal turbinates, chronic nasal obstruction, CAD, nonruptured cerebral aneurysm, sinus bradycardia (worked up by cardiology),  who presents for the following health issues     Patient presents with:  Sinus Problem: Pt her for sinus infection    Tues/Wednesday: got sore throat, forehead pressure, congestion - clear mucus, little cough  Thursday night: got really bad right ear pain, and tinnitus. Came in and got prescribed antibiotic drop  - started the ear drops, and ear pain got better, tinnitus the same    - since Saturday morning, has progressively gotten worse  - headache, sore throat - particularly, productive cough with green mucus,   - taking pseudophed and afrin for congestion  - taking mucinex BID  - pseudophed q4 h  - afrin this morning    SOB with coughing, SOB with standing,  little winded with stairs  - some dizziness with sneezing  - feels feverish, but no fever  - no chest pain  - no leg swelling    Chart Review:  - seen 7/28/23 by Dr. Tinajero. Concern for Right otitis external, given abx ear drops, in setting of URI symptoms with cough, sore throat, and congestion    Review of Systems   Constitutional, HEENT, cardiovascular, pulmonary, GI, and  systems are negative, except as otherwise noted.      Objective    /82 (BP Location: Left arm, Patient Position: Sitting, Cuff Size: Adult Large)   Pulse 55   Temp 98.5  F (36.9  C) (Oral)   Resp 17   SpO2 98%   There is no height or weight on file to calculate BMI.    Physical Exam   Physical Exam  Constitutional:       General: He is not in acute distress.     Appearance: Normal appearance. He is diaphoretic. He is not ill-appearing.   HENT:      Head: Normocephalic and atraumatic.      Comments: Mild tenderness over bilateral frontal sinuses     Right Ear: Tympanic membrane, ear canal and external ear normal.      Left Ear: Tympanic membrane, ear canal and external ear normal.      Nose: No congestion or rhinorrhea.      Mouth/Throat:      Mouth: Mucous membranes are moist.      Pharynx: Posterior oropharyngeal erythema (slight, posterior pharynx) present. No oropharyngeal exudate.   Eyes:      Extraocular Movements: Extraocular movements intact.      Conjunctiva/sclera: Conjunctivae normal.   Cardiovascular:      Rate and Rhythm: Normal rate and regular rhythm.      Heart sounds: Normal heart sounds.   Pulmonary:      Effort: Pulmonary effort is normal.      Breath sounds: Normal breath sounds. No wheezing, rhonchi or rales.   Musculoskeletal:      Right lower leg: Edema (mild) present.      Left lower leg: Edema (mild) present.   Lymphadenopathy:      Cervical: Cervical adenopathy (shotty, bilateral, mildly tender) present.   Skin:     General: Skin is warm.   Neurological:      Mental Status: He is alert and oriented to  person, place, and time.   Psychiatric:         Mood and Affect: Mood normal.         Behavior: Behavior normal.         Thought Content: Thought content normal.         Judgment: Judgment normal.          ----- Service Performed and Documented by Resident  ------

## 2023-08-01 NOTE — PROGRESS NOTES
Preceptor Attestation:   Patient seen, evaluated and discussed with the resident. I have verified the content of the note, which accurately reflects my assessment of the patient and the plan of care.   Supervising Physician:  Tommy Stevens MD

## 2023-08-03 DIAGNOSIS — J01.10 ACUTE NON-RECURRENT FRONTAL SINUSITIS: Primary | ICD-10-CM

## 2023-08-03 RX ORDER — AMOXICILLIN 875 MG
875 TABLET ORAL 2 TIMES DAILY
Qty: 10 TABLET | Refills: 0 | Status: SHIPPED | OUTPATIENT
Start: 2023-08-03 | End: 2023-08-08

## 2023-08-03 RX ORDER — FLUTICASONE PROPIONATE 50 MCG
1 SPRAY, SUSPENSION (ML) NASAL DAILY PRN
Qty: 9.9 ML | Refills: 0 | Status: SHIPPED | OUTPATIENT
Start: 2023-08-03 | End: 2023-08-25

## 2023-08-25 DIAGNOSIS — J01.10 ACUTE NON-RECURRENT FRONTAL SINUSITIS: ICD-10-CM

## 2023-08-25 RX ORDER — FLUTICASONE PROPIONATE 50 MCG
1 SPRAY, SUSPENSION (ML) NASAL DAILY PRN
Qty: 16 ML | Refills: 3 | Status: SHIPPED | OUTPATIENT
Start: 2023-08-25 | End: 2024-04-25

## 2023-08-25 NOTE — TELEPHONE ENCOUNTER
"Request for medication refill:  fluticasone (FLONASE) 50 MCG/ACT nasal spray   Providers if patient needs an appointment and you are willing to give a one month supply please refill for one month and  send a letter/MyChart using \".SMILLIMITEDREFILL\" .smillimited and route chart to \"P SMI \" (Giving one month refill in non controlled medications is strongly recommended before denial)    If refill has been denied, meaning absolutely no refills without visit, please complete the smart phrase \".smirxrefuse\" and route it to the \"P SMI MED REFILLS\"  pool to inform the patient and the pharmacy.    Mali Carrasco MA     "

## 2023-08-30 ENCOUNTER — TELEPHONE (OUTPATIENT)
Dept: ENDOCRINOLOGY | Facility: CLINIC | Age: 53
End: 2023-08-30

## 2023-08-30 NOTE — TELEPHONE ENCOUNTER
Left VM 8/30    I am blocking my clinic from 11:30am to 1:30pm on 9/28. Can you please reschedule this patient for 7am or 8am on 9/28. It is ok to be in person or virtual. Please let me know if neither of those times work, and I can see if there is another place to get rescheduled too.

## 2023-09-08 ENCOUNTER — MYC MEDICAL ADVICE (OUTPATIENT)
Dept: FAMILY MEDICINE | Facility: CLINIC | Age: 53
End: 2023-09-08
Payer: COMMERCIAL

## 2023-09-12 ENCOUNTER — OFFICE VISIT (OUTPATIENT)
Dept: FAMILY MEDICINE | Facility: CLINIC | Age: 53
End: 2023-09-12
Payer: COMMERCIAL

## 2023-09-12 VITALS — RESPIRATION RATE: 18 BRPM | OXYGEN SATURATION: 98 % | WEIGHT: 293.9 LBS | HEIGHT: 75 IN | BODY MASS INDEX: 36.54 KG/M2

## 2023-09-12 DIAGNOSIS — M77.11 LATERAL EPICONDYLITIS OF RIGHT ELBOW: ICD-10-CM

## 2023-09-12 DIAGNOSIS — I10 BENIGN ESSENTIAL HYPERTENSION: ICD-10-CM

## 2023-09-12 DIAGNOSIS — E78.5 HYPERLIPIDEMIA LDL GOAL <70: ICD-10-CM

## 2023-09-12 DIAGNOSIS — H93.13 TINNITUS, BILATERAL: ICD-10-CM

## 2023-09-12 DIAGNOSIS — Z23 NEED FOR PROPHYLACTIC VACCINATION AND INOCULATION AGAINST INFLUENZA: Primary | ICD-10-CM

## 2023-09-12 PROCEDURE — 80061 LIPID PANEL: CPT | Performed by: FAMILY MEDICINE

## 2023-09-12 PROCEDURE — 90682 RIV4 VACC RECOMBINANT DNA IM: CPT | Performed by: FAMILY MEDICINE

## 2023-09-12 PROCEDURE — 36415 COLL VENOUS BLD VENIPUNCTURE: CPT | Performed by: FAMILY MEDICINE

## 2023-09-12 PROCEDURE — 99214 OFFICE O/P EST MOD 30 MIN: CPT | Mod: 25 | Performed by: FAMILY MEDICINE

## 2023-09-12 PROCEDURE — 90471 IMMUNIZATION ADMIN: CPT | Performed by: FAMILY MEDICINE

## 2023-09-12 RX ORDER — LOSARTAN POTASSIUM 50 MG/1
50 TABLET ORAL DAILY
Qty: 90 TABLET | Refills: 3 | Status: SHIPPED | OUTPATIENT
Start: 2023-09-12 | End: 2023-10-03

## 2023-09-12 NOTE — PROGRESS NOTES
"  Assessment & Plan     Benign essential hypertension  WEll controlled on lisinopril though also had classic ACE-I cough so will replace lisinopril with losartan, Measure BP at home if controlled after 1-2 weeks continue if low take 25 mg if uncontrolled on 50 mg take 100 mg    - losartan (COZAAR) 50 MG tablet; Take 1 tablet (50 mg) by mouth daily    Hyperlipidemia LDL goal <70  Will recheck annual lipid profile goal <70 LDL     - Lipid panel reflex to direct LDL Non-fasting    Tinnitus, bilateral  Normal ear exam  - Adult ENT  Referral; Future    Lateral epicondylitis of right elbow  Advised to use a Tennis elbow strap and decrease weights -if prolonged recovery consider referral to PT     Need for prophylactic vaccination and inoculation against influenza    - INFLUENZA QUAD, RECOMBINANT, P-FREE (RIV4) (FLUBLOK)      I spent a total of 35 minutes on the day of the visit.   Time spent by me doing chart review, history and exam, documentation and further activities per the note       BMI:   Estimated body mass index is 37.23 kg/m  as calculated from the following:    Height as of this encounter: 1.892 m (6' 2.5\").    Weight as of this encounter: 133.3 kg (293 lb 14.4 oz).   Weight management plan: Is following up with weight management center        Return in about 1 year (around 9/12/2024).    Richy Dorsey MD  [facility] KRUPA Medina is a 53 year old, presenting for the following health issues:  Ear Problem (Left ear pain with ringing  (tinnitus) ), Hypertension (Well controlled with medications. Complains of dizziness with position changes ), Cough (Dry cough from lisinopril ), Elbow Pain (Elbow pain when lifting at certain angles ), and Imm/Inj (Flu Shot)        9/12/2023     2:43 PM   Additional Questions   Roomed by John   Accompanied by Self       HPI   Tinnitus since a sinus infection one month ago -pain resolved with erythromycin though tinnitus has persisted -Right " "ear also had pain 10 days ago.    Reports some vertigo with sneezing that rapidly cleared  Also reports some orthostatic hypotension symptoms also a chronic recurrent cough that resolves when lisinopril is stopped.  Right elbow pain with twisting arm          Review of Systems         Objective    Resp 18   Ht 1.892 m (6' 2.5\")   Wt 133.3 kg (293 lb 14.4 oz)   SpO2 98%   BMI 37.23 kg/m    Body mass index is 37.23 kg/m .  Physical Exam  Vitals reviewed.   Constitutional:       General: He is not in acute distress.     Appearance: He is well-developed. He is not diaphoretic.   HENT:      Head: Normocephalic.      Jaw: Tenderness (lleft side some tenderness over TMJ) present.      Right Ear: Tympanic membrane normal. Tympanic membrane is not injected, scarred or perforated.      Left Ear: No mastoid tenderness. No hemotympanum. Tympanic membrane is scarred. Tympanic membrane is not injected, perforated or retracted.   Eyes:      General: No scleral icterus.     Conjunctiva/sclera: Conjunctivae normal.   Neck:      Thyroid: No thyromegaly.   Cardiovascular:      Rate and Rhythm: Normal rate and regular rhythm.      Heart sounds: Normal heart sounds. No murmur heard.  Pulmonary:      Effort: Pulmonary effort is normal. No respiratory distress.      Breath sounds: Normal breath sounds. No wheezing.   Musculoskeletal:      Right elbow: Tenderness present in lateral epicondyle.      Cervical back: Normal range of motion.      Left lower leg: No edema.   Lymphadenopathy:      Cervical: No cervical adenopathy.   Skin:     General: Skin is warm and dry.   Neurological:      Mental Status: He is alert and oriented to person, place, and time.   Psychiatric:         Behavior: Behavior normal.         Thought Content: Thought content normal.         Judgment: Judgment normal.                              "

## 2023-09-13 LAB
CHOLEST SERPL-MCNC: 107 MG/DL
HDLC SERPL-MCNC: 36 MG/DL
LDLC SERPL CALC-MCNC: 14 MG/DL
NONHDLC SERPL-MCNC: 71 MG/DL
TRIGL SERPL-MCNC: 286 MG/DL

## 2023-09-19 ASSESSMENT — SLEEP AND FATIGUE QUESTIONNAIRES
HOW LIKELY ARE YOU TO NOD OFF OR FALL ASLEEP IN A CAR, WHILE STOPPED FOR A FEW MINUTES IN TRAFFIC: WOULD NEVER DOZE
HOW LIKELY ARE YOU TO NOD OFF OR FALL ASLEEP WHILE WATCHING TV: HIGH CHANCE OF DOZING
HOW LIKELY ARE YOU TO NOD OFF OR FALL ASLEEP WHILE SITTING AND TALKING TO SOMEONE: WOULD NEVER DOZE
HOW LIKELY ARE YOU TO NOD OFF OR FALL ASLEEP WHILE SITTING INACTIVE IN A PUBLIC PLACE: SLIGHT CHANCE OF DOZING
HOW LIKELY ARE YOU TO NOD OFF OR FALL ASLEEP WHILE LYING DOWN TO REST IN THE AFTERNOON WHEN CIRCUMSTANCES PERMIT: HIGH CHANCE OF DOZING
HOW LIKELY ARE YOU TO NOD OFF OR FALL ASLEEP WHILE SITTING QUIETLY AFTER LUNCH WITHOUT ALCOHOL: WOULD NEVER DOZE
HOW LIKELY ARE YOU TO NOD OFF OR FALL ASLEEP WHILE SITTING AND READING: WOULD NEVER DOZE
HOW LIKELY ARE YOU TO NOD OFF OR FALL ASLEEP WHEN YOU ARE A PASSENGER IN A CAR FOR AN HOUR WITHOUT A BREAK: WOULD NEVER DOZE

## 2023-09-22 ENCOUNTER — VIRTUAL VISIT (OUTPATIENT)
Dept: ENDOCRINOLOGY | Facility: CLINIC | Age: 53
End: 2023-09-22
Payer: COMMERCIAL

## 2023-09-22 VITALS — WEIGHT: 287 LBS | BODY MASS INDEX: 35.68 KG/M2 | HEIGHT: 75 IN

## 2023-09-22 DIAGNOSIS — E66.01 CLASS 2 SEVERE OBESITY WITH SERIOUS COMORBIDITY AND BODY MASS INDEX (BMI) OF 36.0 TO 36.9 IN ADULT, UNSPECIFIED OBESITY TYPE (H): Primary | ICD-10-CM

## 2023-09-22 DIAGNOSIS — E66.812 CLASS 2 SEVERE OBESITY WITH SERIOUS COMORBIDITY AND BODY MASS INDEX (BMI) OF 36.0 TO 36.9 IN ADULT, UNSPECIFIED OBESITY TYPE (H): Primary | ICD-10-CM

## 2023-09-22 PROCEDURE — 99205 OFFICE O/P NEW HI 60 MIN: CPT | Mod: VID

## 2023-09-22 ASSESSMENT — PAIN SCALES - GENERAL: PAINLEVEL: MILD PAIN (3)

## 2023-09-22 NOTE — PROGRESS NOTES
"Virtual Visit Details    Type of service:  Video Visit     Originating Location (pt. Location): {video visit patient location:832014::\"Home\"}  {PROVIDER LOCATION On-site should be selected for visits conducted from your clinic location or adjoining [facility] hospital, academic office, or other nearby [facility] building. Off-site should be selected for all other provider locations, including home:[id number]}  Distant Location (provider location):  {virtual location provider:480920}  Platform used for Video Visit: {Virtual Visit Platforms:738258::\"Flicstart\"}    "

## 2023-09-22 NOTE — NURSING NOTE
Is the patient currently in the state of [place]? YES    Visit mode:VIDEO    If the visit is dropped, the patient can be reconnected by: VIDEO VISIT: Send to e-mail at: [email or web address]    Will anyone else be joining the visit? NO  (If patient encounters technical issues they should call [phone])    How would you like to obtain your AVS? MyChart    Are changes needed to the allergy or medication list? No    Pt states slight right tennis elbow pain today 3/10.    Reason for visit: Consult    Aldo HOUSTON

## 2023-09-22 NOTE — PROGRESS NOTES
"Virtual Visit Details    Type of service:  Video Visit     Originating Location (pt. Location): Home    Distant Location (provider location):  Off-site  Platform used for Video Visit: [employer]        71 minutes spent by me on the date of the encounter doing chart review, history and exam, documentation and further activities per the note    New Medical Weight Management Consult    PATIENT:  Lui Arrington  MRN:         [id number]  :         1970  ANNITA:         2023        I had the pleasure of seeing your patient, Lui Arrington. Full intake/assessment was done to determine barriers to weight loss success and develop a treatment plan. Lui Arrington is a 53 year old male interested in treatment of medical problems associated with excess weight. He has a height of 6' 2.5\", a weight of 287 lbs 0 oz, and the calculated Body mass index is 36.36 kg/m .        Assessment & Plan   Problem List Items Addressed This Visit          Digestive    Class 2 severe obesity with serious comorbidity and body mass index (BMI) of 36.0 to 36.9 in adult (H) - Primary     Overweight onset in childhood. Weight gain has been gradual. Weight loss through college due to drug and ETOH abuse. Was weight stable at 260lbs through mid-20s. Weight increase through sobriety - gained 80lbs. At 37yo lost 60lbs through WW from 320lb to 265lb. Was able to maintain 265lb for many years. Weight regain has been gradual to around 290lbs. Has been stable at this weight for the past year. Has been able to lose 4lbs in the past year through low calorie diet (loose it jaime). Weight influenced by family hx, emotional eating response, and increase stress.  Wants to lose weight to feel better overall and increase activity.     Discussed AOMs to help with weight loss. Goal to maintain weight through life style changes.   - Phentermine - contraindicated due to hx of drug abuse  - GLP-1 - can consider in the future. No contraindications. No hx " "of pancreatitis. No personal or family hx of MTC or MENII.   - Contrave/Naltrexone + Wellbutrin - discussed starting to help with emotional eating response. No contraindications. Discussed side effects, risks, and benefits. No hx of anxiety, HTN or seizures. Will discuss option with psychologist.   - Topiramate - discussed starting. No contraindications. Discussed side effects, risks, and benefits. No hx of kidney stones or disease. Will discuss option with psychologis.                  Discuss Contrave and topiramate with psychologist. Reach out via [employer] if would like to start prior to next visit.   Continue to track food through loose it jaime.   VIJAY Maloney on 9/29/23.   Follow up with Korin Leslie in 2-3 months.         Lui Arrington is a 53 year old male who presents to clinic today for the following health issues.     He has the following co-morbidities:        9/19/2023    10:44 AM   --   I have the following health issues associated with obesity High Blood Pressure   I have the following symptoms associated with obesity None of the above     CAD - most recent angio was normal. Followed by cardiology     HTN - well controlled on medications. Followed by PCP and cardiology     Bradycardia - followed by PCP     Cerebral aneurysm - found incidendity. no change since 2018. Followed by neurology.     Diverticulosis - hx of diverticulitis over 10 years ago. Currently has diarrhea at times, worse with traveling. Takes lomotil PRN.     No diagnosis of anxiety or depression, but is followed by psychologist.         9/19/2023    10:44 AM   Referring Provider   Please name the provider who referred you to Medical Weight Management  If you do not know, please answer \"I Don't Know\" Estelita Carballo MD           9/19/2023    10:44 AM   Weight History   How concerned are you about your weight? Very Concerned   I became overweight As a Child   The following factors have contributed to my weight gain Eating Wrong Types of " "Food    Eating Too Much    Genetic (Runs in the Family)   I have tried the following methods to lose weight Watching Portions or Calories    Exercise    Weight Watchers    Fasting   My lowest weight since age 16 was 265   My highest weight since age 16 was 320   The most weight I have ever lost was (lbs) 85   I have the following family history of obesity/being overweight My mother is overweight    My father is overweight    One or more of my siblings are overweight   How has your weight changed over the last year? Lost   How many pounds? 7     Overweight onset in childhood. Weight was hard part of childhood, always felt larger then the other children. Weight gain has rosanne gradual. In college lost weight through drugs and ETOH abuse. Was 260lbs at that time, and was happy at that weight. Has been sober for 30 years. Since sobriety gained 80lbs gradually to 320lbs. At 37yo was able to lose 60lbs through Weight Watchers to 265lbs. Was able to maintain that weight loss for many years. Weight regain has been gradual to 292lbs. Has lost around 4lbs in the past year through low calorie diet through loose it jaime and increase activity. Wants to lose weight to feel better overall and to increase activity.     Eating 3 meals a day, with 2 snacks daily. Unsure if eating out of hunger or due to emotional. At times is hungry between meals. Job is stressful, and thinks will eat out of a response. Tries to buy individual packaged snacks. Feels like he is \"addicted\" to fast food, but is able to abstain most of the time. Can get full and can stay full. Craves carbs and fats. Eats out 2-3xweek for work.     Activity - is meeting with a  next week. Walking 3-6 miles, pelaton bike and work outs. Working out 3xweek.     Has not tried AOMs in the past               9/19/2023    10:44 AM   Diet Recall Review with Patient   If you do eat breakfast, what types of food do you eat? Bagel with cream cheese, or fruit/yogurt/granola.  " Pancakes or French Toast weekly.  Eggs and hashbrowns 2x/month   If you do eat lunch, what types of food do you typically eat? Widely varied - ethnic restaurant or restaurant salad, or sandwich and chips at home   If you do eat supper, what types of food do you typically eat? No pattern - from popcorn or cheese and crackers to full meals.   If you do snack, what types of food do you typically eat? Carbohydrates.  Too often gas station ultraprocessed food.  Sometimes fruit, cheese and crackers, or beef jerky   How many glasses of juice do you drink in a typical day? 0   How many of glasses of milk do you drink in a typical day? 0   How many 8oz glasses of sugar containing drinks such as [employer]/sweet tea do you drink in a day? 0   How many cans/bottles of sugar pop/soda/tea/sports drinks do you drink in a day? 0.5   How many cans/bottles of diet pop/soda/tea or sports drink do you drink in a day? 1.5   How often do you have a drink of alcohol? Never           9/19/2023    10:44 AM   Eating Habits   Generally, my meals include foods like these bread, pasta, rice, potatoes, corn, crackers, sweet dessert, pop, or juice Everyday   Generally, my meals include foods like these fried meats, brats, burgers, french fries, pizza, cheese, chips, or ice cream A Few Times a Week   Eat fast food (like McDonalds, [employer], Taco Bell) Once a Week   Eat at a buffet or sit-down restaurant A Few Times a Week   Often skip meals, eat at random times, have no regular eating times A Few Times a Week   Rarely sit down for a meal but snack or graze throughout Less Than Weekly   Eat extra snacks between meals Almost Everyday   Eat most of my food at the end of the day A Few Times a Week   Eat in the middle of the night or wake up at night to eat Never   Eat extra snacks to prevent or correct low blood sugar Never   Eat to prevent acid reflux or stomach pain Never   Worry about not having enough food to eat A Few Times a Week   I eat when  I am depressed Less Than Weekly   I eat when I am stressed A Few Times a Week   I eat when I am bored A Few Times a Week   I eat when I am anxious A Few Times a Week   I eat when I am happy or as a reward A Few Times a Week   I feel hungry all the time even if I just have eaten Never   Feeling full is important to me Less Than Weekly   I finish all the food on my plate even if I am already full Never   I eat/snack without noticing that I am eating A Few Times a Week   I eat when I am preparing the meal A Few Times a Week   I eat more than usual when I see others eating Never   I have trouble not eating sweets, ice cream, cookies, or chips if they are around the house Never   I think about food all day Almost Everyday   What foods, if any, do you crave? Chips/Crackers   Please list any other foods you crave? Bread/complex carbohydrates, and red meat           9/19/2023    10:44 AM   Amount of Food   I feel out of control when eating Weekly   I eat a large amount of food, like a loaf of bread, a box of cookies, a pint/quart of ice cream, all at once Monthly   I eat a large amount of food even when I am not hungry Never   I eat rapidly Almost Everyday   I eat alone because I feel embarrassed and do not want others to see how much I have eaten Monthly   I eat until I am uncomfortably full Weekly   I feel bad, disgusted, or guilty after I overeat Weekly           9/19/2023    10:44 AM   Activity/Exercise History   How much of a typical 12 hour day do you spend sitting? Most of the Day   How much of a typical 12 hour day do you spend lying down? Less Than Half the Day   How much of a typical day do you spend walking/standing? Less Than Half the Day   How many hours (not including work) do you spend on the TV/Video Games/Computer/Tablet/Phone? 2-3 Hours   How many times a week are you active for the purpose of exercise? 2-3 Times a Week   What keeps you from being more active? Lack of Time    Too tired   How many total  minutes do you spend doing some activity for the purpose of exercising when you exercise? More Than 30 Minutes       PAST MEDICAL HISTORY:  Past Medical History:   Diagnosis Date    Brain aneurysm     Small    Coronary artery disease     Diverticulosis     Seasonal allergies     Substance dependence, in remission (H) 1993 9/19/2023    10:44 AM   Work/Social History Reviewed With Patient   My employment status is Full-Time   My job /Educator   How much of your job is spent on the computer or phone? 75%   How many hours do you spend commuting to work daily? Varies widely   What is your marital status? /In a Relationship   If in a relationship, is your significant other overweight? Yes   If you have children, are they overweight? No   Who do you live with? Spouse and 14 year old son   Who does the food shopping? 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 Will travel 1xmonth. Very good support     Sober for 30 years.   No ETOH, drugs, or THC           9/19/2023    10:44 AM   Mental Health History Reviewed With Patient   Have you ever been physically or sexually abused? No   How often in the past 2 weeks have you felt little interest or pleasure in doing things? Not at all   Over the past 2 weeks how often have you felt down, depressed, or hopeless? Not at all           9/19/2023    10:44 AM   Sleep History Reviewed With Patient   How many hours do you sleep at night? 7       MEDICATIONS:   Current Outpatient Medications   Medication Sig Dispense Refill    aspirin 81 MG EC tablet Take 1 tablet (81 mg) by mouth daily 90 tablet 3    atorvastatin (LIPITOR) 40 MG tablet TAKE 1 TABLET (40 MG) BY MOUTH EVERY EVENING. *TIME FOR A FOLLOW UP VISIT WITH LABS* 90 tablet 0    diphenoxylate-atropine (LOMOTIL) 2.5-0.025 MG tablet Take 1 tablet by mouth 4 times daily as needed for diarrhea 40 tablet 0    fluticasone (FLONASE) 50  "MCG/ACT nasal spray SPRAY 1 SPRAY INTO BOTH NOSTRILS DAILY AS NEEDED FOR RHINITIS OR ALLERGIES 16 mL 3    losartan (COZAAR) 50 MG tablet Take 1 tablet (50 mg) by mouth daily 90 tablet 3    sildenafil (REVATIO) 20 MG tablet Take 1 tablet (20 mg) by mouth as needed (ED) 30 tablet 11       ALLERGIES:   Allergies   Allergen Reactions    Ciprofloxacin Hives    Erythromycin     Seasonal Allergies     Lisinopril Cough           9/19/2023    10:10 AM   SKIP Score (Last Two)   SKIP Raw Score 32   Activation Score 62.6   SKIP Level 3         Objective    Ht 1.892 m (6' 2.5\")   Wt 130.2 kg (287 lb)   BMI 36.36 kg/m           Vitals:  No vitals were obtained today due to virtual visit.    Physical Exam   GENERAL: Healthy, alert and no distress  EYES: Eyes grossly normal to inspection.  No discharge or erythema, or obvious scleral/conjunctival abnormalities.  RESP: No audible wheeze, cough, or visible cyanosis.  No visible retractions or increased work of breathing.    SKIN: Visible skin clear. No significant rash, abnormal pigmentation or lesions.  NEURO: Cranial nerves grossly intact.  Mentation and speech appropriate for age.  PSYCH: Mentation appears normal, affect normal/bright, judgement and insight intact, normal speech and appearance well-groomed.     Anti-obesity medication ROS:    HEENT  Hx of glaucoma: No    Cardiovascular  CAD:Yes  HTN:Yes    Gastrointestinal  GERD:No  Constipation:Yes, controlled with miralax PRN   Liver Dz:No  H/O Pancreatitis:No    Psychiatric  Bipolar: No  Anxiety:No  Depression:No  History of alcohol/drug abuse: Yes  Hx of eating disorder:No    Endocrine  Personal or family hx of MTC or MEN2:No  Diabetes/prediabetes: No    Neurologic:  Hx of seizures: No  Hx of migraines: No  Memory Impairment: No      History of kidney stones: No  Kidney disease: No  Current birth control:  N/A    Taking Opioid/Narcotic: No        Sincerely,    CECY SANCHEZ PA-C  "

## 2023-09-22 NOTE — LETTER
"2023       RE: Lui Arrington  [address] [place] [address]     Dear Colleague,    Thank you for referring your patient, Lui Arrington, to the [facility] WEIGHT MANAGEMENT CLINIC [facility]. Please see a copy of my visit note below.    Virtual Visit Details    Type of service:  Video Visit     Originating Location (pt. Location): Home    Distant Location (provider location):  Off-site  Platform used for Video Visit: AmRABBL        71 minutes spent by me on the date of the encounter doing chart review, history and exam, documentation and further activities per the note    New Medical Weight Management Consult    PATIENT:  Lui Arrington  MRN:         [id number]  :         1970  ANNITA:         2023        I had the pleasure of seeing your patient, Lui Arrington. Full intake/assessment was done to determine barriers to weight loss success and develop a treatment plan. Lui Arrington is a 53 year old male interested in treatment of medical problems associated with excess weight. He has a height of 6' 2.5\", a weight of 287 lbs 0 oz, and the calculated Body mass index is 36.36 kg/m .        Assessment & Plan  Problem List Items Addressed This Visit          Digestive    Class 2 severe obesity with serious comorbidity and body mass index (BMI) of 36.0 to 36.9 in adult (H) - Primary     Overweight onset in childhood. Weight gain has been gradual. Weight loss through college due to drug and ETOH abuse. Was weight stable at 260lbs through mid-20s. Weight increase through sobriety - gained 80lbs. At 37yo lost 60lbs through WW from 320lb to 265lb. Was able to maintain 265lb for many years. Weight regain has been gradual to around 290lbs. Has been stable at this weight for the past year. Has been able to lose 4lbs in the past year through low calorie diet (loose it jaime). Weight influenced by family hx, emotional " eating response, and increase stress.  Wants to lose weight to feel better overall and increase activity.     Discussed AOMs to help with weight loss. Goal to maintain weight through life style changes.   - Phentermine - contraindicated due to hx of drug abuse  - GLP-1 - can consider in the future. No contraindications. No hx of pancreatitis. No personal or family hx of MTC or MENII.   - Contrave/Naltrexone + Wellbutrin - discussed starting to help with emotional eating response. No contraindications. Discussed side effects, risks, and benefits. No hx of anxiety, HTN or seizures. Will discuss option with psychologist.   - Topiramate - discussed starting. No contraindications. Discussed side effects, risks, and benefits. No hx of kidney stones or disease. Will discuss option with psychologis.                  Discuss Contrave and topiramate with psychologist. Reach out via realSociable if would like to start prior to next visit.   Continue to track food through loose it jaime.   VIJAY Maloney on 9/29/23.   Follow up with Korin Leslie in 2-3 months.         Lui Arrington is a 53 year old male who presents to clinic today for the following health issues.     He has the following co-morbidities:        9/19/2023    10:44 AM   --   I have the following health issues associated with obesity High Blood Pressure   I have the following symptoms associated with obesity None of the above     CAD - most recent angio was normal. Followed by cardiology     HTN - well controlled on medications. Followed by PCP and cardiology     Bradycardia - followed by PCP     Cerebral aneurysm - found incidendity. no change since 2018. Followed by neurology.     Diverticulosis - hx of diverticulitis over 10 years ago. Currently has diarrhea at times, worse with traveling. Takes lomotil PRN.     No diagnosis of anxiety or depression, but is followed by psychologist.         9/19/2023    10:44 AM   Referring Provider   Please name the provider who  "referred you to Medical Weight Management  If you do not know, please answer \"I Don't Know\" Estelita Carballo MD           9/19/2023    10:44 AM   Weight History   How concerned are you about your weight? Very Concerned   I became overweight As a Child   The following factors have contributed to my weight gain Eating Wrong Types of Food    Eating Too Much    Genetic (Runs in the Family)   I have tried the following methods to lose weight Watching Portions or Calories    Exercise    Weight Watchers    Fasting   My lowest weight since age 16 was 265   My highest weight since age 16 was 320   The most weight I have ever lost was (lbs) 85   I have the following family history of obesity/being overweight My mother is overweight    My father is overweight    One or more of my siblings are overweight   How has your weight changed over the last year? Lost   How many pounds? 7     Overweight onset in childhood. Weight was hard part of childhood, always felt larger then the other children. Weight gain has rosanne gradual. In college lost weight through drugs and ETOH abuse. Was 260lbs at that time, and was happy at that weight. Has been sober for 30 years. Since sobriety gained 80lbs gradually to 320lbs. At 39yo was able to lose 60lbs through Weight Watchers to 265lbs. Was able to maintain that weight loss for many years. Weight regain has been gradual to 292lbs. Has lost around 4lbs in the past year through low calorie diet through loose it jaime and increase activity. Wants to lose weight to feel better overall and to increase activity.     Eating 3 meals a day, with 2 snacks daily. Unsure if eating out of hunger or due to emotional. At times is hungry between meals. Job is stressful, and thinks will eat out of a response. Tries to buy individual packaged snacks. Feels like he is \"addicted\" to fast food, but is able to abstain most of the time. Can get full and can stay full. Craves carbs and fats. Eats out 2-3xweek for work. "     Activity - is meeting with a  next week. Walking 3-6 miles, pelaton bike and work outs. Working out 3xweek.     Has not tried AOMs in the past               9/19/2023    10:44 AM   Diet Recall Review with Patient   If you do eat breakfast, what types of food do you eat? Bagel with cream cheese, or fruit/yogurt/granola.  Pancakes or French Toast weekly.  Eggs and hashbrowns 2x/month   If you do eat lunch, what types of food do you typically eat? Widely varied - ethnic restaurant or restaurant salad, or sandwich and chips at home   If you do eat supper, what types of food do you typically eat? No pattern - from popcorn or cheese and crackers to full meals.   If you do snack, what types of food do you typically eat? Carbohydrates.  Too often gas station ultraprocessed food.  Sometimes fruit, cheese and crackers, or beef jerky   How many glasses of juice do you drink in a typical day? 0   How many of glasses of milk do you drink in a typical day? 0   How many 8oz glasses of sugar containing drinks such as [employer]/sweet tea do you drink in a day? 0   How many cans/bottles of sugar pop/soda/tea/sports drinks do you drink in a day? 0.5   How many cans/bottles of diet pop/soda/tea or sports drink do you drink in a day? 1.5   How often do you have a drink of alcohol? Never           9/19/2023    10:44 AM   Eating Habits   Generally, my meals include foods like these bread, pasta, rice, potatoes, corn, crackers, sweet dessert, pop, or juice Everyday   Generally, my meals include foods like these fried meats, brats, burgers, french fries, pizza, cheese, chips, or ice cream A Few Times a Week   Eat fast food (like McDonalds, [employer] Mike, [employer] Bell) Once a Week   Eat at a buffet or sit-down restaurant A Few Times a Week   Often skip meals, eat at random times, have no regular eating times A Few Times a Week   Rarely sit down for a meal but snack or graze throughout Less Than Weekly   Eat extra snacks between meals  Almost Everyday   Eat most of my food at the end of the day A Few Times a Week   Eat in the middle of the night or wake up at night to eat Never   Eat extra snacks to prevent or correct low blood sugar Never   Eat to prevent acid reflux or stomach pain Never   Worry about not having enough food to eat A Few Times a Week   I eat when I am depressed Less Than Weekly   I eat when I am stressed A Few Times a Week   I eat when I am bored A Few Times a Week   I eat when I am anxious A Few Times a Week   I eat when I am happy or as a reward A Few Times a Week   I feel hungry all the time even if I just have eaten Never   Feeling full is important to me Less Than Weekly   I finish all the food on my plate even if I am already full Never   I eat/snack without noticing that I am eating A Few Times a Week   I eat when I am preparing the meal A Few Times a Week   I eat more than usual when I see others eating Never   I have trouble not eating sweets, ice cream, cookies, or chips if they are around the house Never   I think about food all day Almost Everyday   What foods, if any, do you crave? Chips/Crackers   Please list any other foods you crave? Bread/complex carbohydrates, and red meat           9/19/2023    10:44 AM   Amount of Food   I feel out of control when eating Weekly   I eat a large amount of food, like a loaf of bread, a box of cookies, a pint/quart of ice cream, all at once Monthly   I eat a large amount of food even when I am not hungry Never   I eat rapidly Almost Everyday   I eat alone because I feel embarrassed and do not want others to see how much I have eaten Monthly   I eat until I am uncomfortably full Weekly   I feel bad, disgusted, or guilty after I overeat Weekly           9/19/2023    10:44 AM   Activity/Exercise History   How much of a typical 12 hour day do you spend sitting? Most of the Day   How much of a typical 12 hour day do you spend lying down? Less Than Half the Day   How much of a typical  day do you spend walking/standing? Less Than Half the Day   How many hours (not including work) do you spend on the TV/Video Games/Computer/Tablet/Phone? 2-3 Hours   How many times a week are you active for the purpose of exercise? 2-3 Times a Week   What keeps you from being more active? Lack of Time    Too tired   How many total minutes do you spend doing some activity for the purpose of exercising when you exercise? More Than 30 Minutes       PAST MEDICAL HISTORY:  Past Medical History:   Diagnosis Date    Brain aneurysm     Small    Coronary artery disease     Diverticulosis     Seasonal allergies     Substance dependence, in remission (H) 1993 9/19/2023    10:44 AM   Work/Social History Reviewed With Patient   My employment status is Full-Time   My job /Educator   How much of your job is spent on the computer or phone? 75%   How many hours do you spend commuting to work daily? Varies widely   What is your marital status? /In a Relationship   If in a relationship, is your significant other overweight? Yes   If you have children, are they overweight? No   Who do you live with? Spouse and 14 year old son   Who does the food shopping? 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 Will travel 1xmonth. Very good support     Sober for 30 years.   No ETOH, drugs, or THC           9/19/2023    10:44 AM   Mental Health History Reviewed With Patient   Have you ever been physically or sexually abused? No   How often in the past 2 weeks have you felt little interest or pleasure in doing things? Not at all   Over the past 2 weeks how often have you felt down, depressed, or hopeless? Not at all           9/19/2023    10:44 AM   Sleep History Reviewed With Patient   How many hours do you sleep at night? 7       MEDICATIONS:   Current Outpatient Medications   Medication Sig Dispense Refill    aspirin 81 MG EC tablet Take 1  "tablet (81 mg) by mouth daily 90 tablet 3    atorvastatin (LIPITOR) 40 MG tablet TAKE 1 TABLET (40 MG) BY MOUTH EVERY EVENING. *TIME FOR A FOLLOW UP VISIT WITH LABS* 90 tablet 0    diphenoxylate-atropine (LOMOTIL) 2.5-0.025 MG tablet Take 1 tablet by mouth 4 times daily as needed for diarrhea 40 tablet 0    fluticasone (FLONASE) 50 MCG/ACT nasal spray SPRAY 1 SPRAY INTO BOTH NOSTRILS DAILY AS NEEDED FOR RHINITIS OR ALLERGIES 16 mL 3    losartan (COZAAR) 50 MG tablet Take 1 tablet (50 mg) by mouth daily 90 tablet 3    sildenafil (REVATIO) 20 MG tablet Take 1 tablet (20 mg) by mouth as needed (ED) 30 tablet 11       ALLERGIES:   Allergies   Allergen Reactions    Ciprofloxacin Hives    Erythromycin     Seasonal Allergies     Lisinopril Cough           9/19/2023    10:10 AM   SKIP Score (Last Two)   SKIP Raw Score 32   Activation Score 62.6   SKIP Level 3         Objective   Ht 1.892 m (6' 2.5\")   Wt 130.2 kg (287 lb)   BMI 36.36 kg/m           Vitals:  No vitals were obtained today due to virtual visit.    Physical Exam   GENERAL: Healthy, alert and no distress  EYES: Eyes grossly normal to inspection.  No discharge or erythema, or obvious scleral/conjunctival abnormalities.  RESP: No audible wheeze, cough, or visible cyanosis.  No visible retractions or increased work of breathing.    SKIN: Visible skin clear. No significant rash, abnormal pigmentation or lesions.  NEURO: Cranial nerves grossly intact.  Mentation and speech appropriate for age.  PSYCH: Mentation appears normal, affect normal/bright, judgement and insight intact, normal speech and appearance well-groomed.     Anti-obesity medication ROS:    HEENT  Hx of glaucoma: No    Cardiovascular  CAD:Yes  HTN:Yes    Gastrointestinal  GERD:No  Constipation:Yes, controlled with miralax PRN   Liver Dz:No  H/O Pancreatitis:No    Psychiatric  Bipolar: No  Anxiety:No  Depression:No  History of alcohol/drug abuse: Yes  Hx of eating disorder:No    Endocrine  Personal or " family hx of MTC or MEN2:No  Diabetes/prediabetes: No    Neurologic:  Hx of seizures: No  Hx of migraines: No  Memory Impairment: No      History of kidney stones: No  Kidney disease: No  Current birth control:  N/A    Taking Opioid/Narcotic: No        Sincerely,    CECY SANCHEZ PA-C

## 2023-09-25 PROBLEM — E66.812 CLASS 2 SEVERE OBESITY WITH SERIOUS COMORBIDITY AND BODY MASS INDEX (BMI) OF 36.0 TO 36.9 IN ADULT (H): Status: ACTIVE | Noted: 2023-09-25

## 2023-09-25 PROBLEM — E66.01 MORBID OBESITY (H): Status: ACTIVE | Noted: 2023-09-25

## 2023-09-26 NOTE — ASSESSMENT & PLAN NOTE
Overweight onset in childhood. Weight gain has been gradual. Weight loss through college due to drug and ETOH abuse. Was weight stable at 260lbs through mid-20s. Weight increase through sobriety - gained 80lbs. At 39yo lost 60lbs through WW from 320lb to 265lb. Was able to maintain 265lb for many years. Weight regain has been gradual to around 290lbs. Has been stable at this weight for the past year. Has been able to lose 4lbs in the past year through low calorie diet (loose it jaime). Weight influenced by family hx, emotional eating response, and increase stress.  Wants to lose weight to feel better overall and increase activity.     Discussed AOMs to help with weight loss. Goal to maintain weight through life style changes.   - Phentermine - contraindicated due to hx of drug abuse  - GLP-1 - can consider in the future. No contraindications. No hx of pancreatitis. No personal or family hx of MTC or MENII.   - Contrave/Naltrexone + Wellbutrin - discussed starting to help with emotional eating response. No contraindications. Discussed side effects, risks, and benefits. No hx of anxiety, HTN or seizures. Will discuss option with psychologist.   - Topiramate - discussed starting. No contraindications. Discussed side effects, risks, and benefits. No hx of kidney stones or disease. Will discuss option with psychologis.

## 2023-09-26 NOTE — PATIENT INSTRUCTIONS
"Taqueria Medina, it was nice to meet you today!  Thank you for allowing us the privilege of caring for you. We hope we provided you with the excellent service you deserve.   Please let us know if there is anything else we can do for you so that we can be sure you are completely satisfied with your care experience.    To ensure the quality of our services you may be receiving a patient satisfaction survey from an independent patient satisfaction monitoring company.    The greatest compliment you can give is a \"Likely to Recommend\"    Your visit was with CECY SANCHEZ PA-C today.    Instructions per today's visit:     Discuss Contrave and topiramate with psychologist. Reach out via Thrive[employer]ive if would like to start prior to next visit.   Continue to track food through loose it jaime.   VIJAY Maloney on 9/29/23.   Follow up with Cecy Leslie in 2-3 months.     ___________________________________________________________________________  Important contact and scheduling information:  Please call our contact center at [phone] to schedule your next appointments.  For any nursing questions or concerns call Padmini Castellon LPN at [phone] or Mary Duffy RN at [phone]  Please call during clinic hours Monday through Friday 8:00a - 4:00p if you have questions or you can contact us via [email or web address] at anytime and we will reply during clinic hours.    Lab results will be communicated through My Chart or letter (if My Chart not used). Please call the clinic if you have not received communication after 1 week or if you have any questions.?  Clinic Fax: [phone]  __________________________________________________________________________    If labs were ordered today:    Please make an appointment to have them drawn at your convenience.     To schedule the Lab Appointment using [email or web address]:  Select \"Schedule an Appointment\"  Select \"Lab Only\"  For \"A couple of questions\", select \"Other\"  For \"Which locations work for you?, select the location " and set up the appointment    To schedule by phone call [phone] to schedule a lab only appointment at any [facility] lab.  ___________________________________________________________________________  Work with A Health !  Virtual Sessions are Available through [facility] Weight Management Clinics    To learn more, call to schedule a free, Health  Q&A appointment: [phone]     What is Health Coaching?  Do you know what you are supposed to do, but you just aren't doing it?  Then, HEALTH COACHING may help you!   Get unstuck and move forward with the support of a professionally trained [employer]-HWC (National Board-Certified Health and ) who uses evidence-based approaches to help you move forward with healthy lifestyle changes in the areas of weight loss, stress management and overall well-being.    Health Coaches help you identify goals that will work best for you. Health Coaches provide support and encouragement with overcoming barriers and help you to find inspiration and motivation to lead a healthy lifestyle.    Option one:  Health Coaching 3-Pack; Three, 30-minute Health Coaching Visits, for $99  Visits are done virtually (phone or video)  This is a self pay service; we do not accept insurance for boy coaching.    Option two:   The 24 week Plan; 11 Health Coaching Visits, and a 7 months subscription to shopkick-- on-demand fitness, nutrition and mindfulness classes, for $499 (employee discounts may be available). Participants will also meet regularly with a weight management Medical Provider and a Registered/Licensed Dietician.  This is a self-pay service; we do not accept insurance for health coaching.    To Schedule a free Health  Q&A appointment to learn more,  call [phone].  ____________________________________________________________________    [facility]   Healthy Lifestyle Virtual Support Group    Healthy  Lifestyle Virtual Support Group?  This is 60 minutes of small group guided discussion, support and resources. All are welcome who want a healthy lifestyle.  WHEN: Starting in July 2023, this group meets the 1st Friday of the month from 12:30 PM - 1:30 PM virtually using Microsoft Teams.    FACILITATOR: Led by National Board Certified Health and , Christina Heredia [facility].   TO REGISTER: Please send an email to Christina [email or web address] to receive monthly invites to the group or if you have any questions about having a health .  Prior to the meeting, a link with directions on how to join the meeting will be sent to you.    2023 Meetings  May 19: Let's Talk  June 9: Create Your Coaching Toolkit: Learn How to  Yourself  July 7: Let's Talk  August 4: Benefits of Fiber with VIJAY Engel  September 1: Show and Tell (share your aps, podcasts, recipes, hacks, books)  October 6 :Let's Talk  November 3: Introduction to Mindfulness   December 1: Let's Talk    If you would like bariatric surgery specific support group info please let your care team know.         Thank you,   [facility] Comprehensive Weight Management Team

## 2023-09-27 ENCOUNTER — TELEPHONE (OUTPATIENT)
Dept: ENDOCRINOLOGY | Facility: CLINIC | Age: 53
End: 2023-09-27

## 2023-09-29 DIAGNOSIS — E66.812 CLASS 2 SEVERE OBESITY WITH SERIOUS COMORBIDITY AND BODY MASS INDEX (BMI) OF 36.0 TO 36.9 IN ADULT, UNSPECIFIED OBESITY TYPE (H): Primary | ICD-10-CM

## 2023-09-29 DIAGNOSIS — E66.01 CLASS 2 SEVERE OBESITY WITH SERIOUS COMORBIDITY AND BODY MASS INDEX (BMI) OF 36.0 TO 36.9 IN ADULT, UNSPECIFIED OBESITY TYPE (H): Primary | ICD-10-CM

## 2023-09-29 RX ORDER — BUPROPION HYDROCHLORIDE 150 MG/1
150 TABLET ORAL EVERY MORNING
Qty: 90 TABLET | Refills: 3 | Status: SHIPPED | OUTPATIENT
Start: 2023-09-29

## 2023-10-03 ENCOUNTER — OFFICE VISIT (OUTPATIENT)
Dept: DERMATOLOGY | Facility: CLINIC | Age: 53
End: 2023-10-03
Payer: COMMERCIAL

## 2023-10-03 VITALS — HEART RATE: 62 BPM | SYSTOLIC BLOOD PRESSURE: 150 MMHG | DIASTOLIC BLOOD PRESSURE: 80 MMHG

## 2023-10-03 DIAGNOSIS — C44.612 BASAL CELL CARCINOMA (BCC) OF RIGHT UPPER ARM: ICD-10-CM

## 2023-10-03 DIAGNOSIS — C44.612 BASAL CELL CARCINOMA (BCC) OF SKIN OF RIGHT UPPER EXTREMITY INCLUDING SHOULDER: ICD-10-CM

## 2023-10-03 PROCEDURE — 88305 TISSUE EXAM BY PATHOLOGIST: CPT | Mod: 26 | Performed by: DERMATOLOGY

## 2023-10-03 PROCEDURE — 88305 TISSUE EXAM BY PATHOLOGIST: CPT | Mod: TC | Performed by: DERMATOLOGY

## 2023-10-03 PROCEDURE — 11602 EXC TR-EXT MAL+MARG 1.1-2 CM: CPT | Performed by: DERMATOLOGY

## 2023-10-03 PROCEDURE — 13121 CMPLX RPR S/A/L 2.6-7.5 CM: CPT | Performed by: DERMATOLOGY

## 2023-10-03 ASSESSMENT — PAIN SCALES - GENERAL: PAINLEVEL: NO PAIN (0)

## 2023-10-03 NOTE — NURSING NOTE
Chief Complaint   Patient presents with    excision     Patient is here today for excision on right upper lateral      Zenaida  CMA

## 2023-10-03 NOTE — LETTER
10/3/2023       RE: Lui Arrington  [address] [place] [address]     Dear Colleague,    Thank you for referring your patient, Lui Arrington, to the [facility] D[facility]MATOLOGIC SURG[facility] [place] at [facility]. Please see a copy of my visit note below.    DERMATOLOGIC EXCISION SURGERY PROCEDURE NOTE     Dermatology Problem List:  BCC R upper arm excised 10/3/23      NAME OF PROCEDURE: Excision with complex linear closure  Staff surgeon: eben  Resident: none  Scrub nurse: julius    PRE-OPERATIVE DIAGNOSIS:  Basal Cell Carcinoma  POST-OPERATIVE DIAGNOSIS: Same   LOCATION: R upper arm  FINAL EXCISION SIZE(DEFECT SIZE): 1.8x1.8 cm  MARGIN: 0.4 cm  FINAL REPAIR LENGTH: 5 cm   ANESTHESIA: 6xx 1% lidocaine with 1:100,000 epinephrine    INDICATIONS: This patient presented with a 1cm Basal Cell Carcinoma.. Excision was indicated.   We discussed the principles of treatment and most likely complications including bleeding, infection, scarring, alteration in skin color and sensation, wound dehiscence,muscle weakness in the area, or recurrence of the lesion or disease. We reviewed that on occasion, after healing, a secondary procedure or revision may be recommended in order to obtain the best cosmetic or functional result.   PROCEDURE: The patient was taken to the operative suite. Time-out was performed. The treatment area was anesthetized. The area was washed with Hibiclens, rinsed with saline and draped with sterile towels. The lesion was delineated and excised down to deep subcutaneous fat in a fusiform manner. Hemostasis was obtained by electrocoagulation.   REPAIR: A complex layered linear closure was selected as the procedure which would maximally preserve both function and cosmesis and for the following reasons: 1) the defect was extensively undermined; 2) multiple deep plication and layered sutures placed; 3) wound size, depth, tension, and  location.   After the excision of the tumor, the area was extensively and carefully undermined using blunt Metzenbaum scissors. Hemostasis was obtained with spot electrocautery and ligation of vessels where necessary. An initial deep plication sutures of 4.0 monocryl sutures  were placed in the deep, subcutaneous and fascial planes to appose the lateral margins.  The subcutaneous and dermal layers were then closed with additional 4.0 monocryl sutures. The epidermis was then carefully approximated along the length of the wound using 4.0 moncryl running subcuticular sutures.   Estimated blood loss was less than 10 ml for all surgical sites. A sterile pressure dressing was applied and wound care instructions, with a written handout, were given. The patient was discharged from the [facility] alert and ambulatory.  The patient elected for pathology results to automatically release and understands that the clinical staff will contact them as soon as possible to notify them of the results.   Follow up for suture removal in n/a and in dermatology clinic with MD in gen derm for skin checks in 6 months.    Dr. Vincent staffed the patient and was present for the key portions of the above procedure.   Staff Involved:  Staff Only    Attending attestation:  I personally performed the entire procedure.  I have reviewed the note and edited it as necessary, and agree with its contents.    Jim Vincent M.D.  Professor  Director of Dermatologic Surgery  Department of Dermatology  [facility]    Dermatology Surgery Clinic  [facility]  [address] [place] [id number]

## 2023-10-03 NOTE — PATIENT INSTRUCTIONS
Wound care    I will experience scar, bleeding, swelling, pain, crusting and redness. I may experience incomplete removal or recurrence. Risks are bleeding, bruising, swelling, infection, nerve damage, & a large wound. A second procedure may be recommended to obtain the best cosmetic or functional result.       A three month office visit with your Surgeon is recommended for scar evaluation. Please reach out sooner if you have concerns about you surgical site/wound.    Caring for your skin after surgery    After your surgery, a pressure bandage will be placed over the area that has stitches. This is important to prevent bleeding. Please follow these instructions over the next 1 to 2 weeks. Following this regimen will help to prevent complications as your wound heals.     For the first 48 hours after your surgery:    Leave the pressure dressing on and keep it dry. If it should come loose, you may re-tape it, but do not take it off.  Relax and take it easy. Do not do any vigorous exercise or heavy lifting. This could cause the wound to bleed.  Post-Operative pain is usually mild. If you are able to take tylenol, You may take plain or extra-strength Tylenol (acetaminophen) As directed on the bottle (do not take more than 4,000mg in one day). If you are able to take ibuprofen, you can alternate the tylenol and ibuprofen.   Avoid alcohol as this may increase your tendency to bleed.   You may put an ice pack around the bandaged area for 20 minutes at a time as needed. This may help reduce swelling, bruising, and pain. Make sure the ice pack is waterproof so that the pressure bandage doesn t get wet.  If the wound is on the face try to sleep with your head elevated. Either in a recliner or propped up in bed, this will decrease swelling around the eyes.   You may see a small amount of drainage or blood on your pressure bandage. This is normal. However:  If drainage or bleeding continues or saturates the bandage, you will  "need to apply firm pressure over the bandage with a piece of gauze for 15 minutes.  If bleeding continues after applying pressure for 15 minutes, apply an ice pack to the bandaged area for 15 minutes.  If bleeding still continues, call our office or go to the nearest emergency room.    Remove pressure dressing 48 hours after surgery:    Carefully remove the pressure bandage. If it seems sticky or too difficult to get off, you may need to soak it off in the shower.  After the pressure dressing is removed, you may shower and get the wound wet. However, Do Not let the forceful stream of the shower hit the wound directly.  Follow these wound care and dressing change instructions:    You have steri strips, skin glue (purplish/clear), and tegaderm (clear film) over your incision line, you can shower.   You may allow water to run over the site. Do not soak.  Do Not rub or scrub the site.  Pat dry after the shower or bath.  Avoid topical medications, lotions, creams, ointment,or oils.  Do not use tanning lamps or expose the site to sun.   Check wound appearance daily, some swelling and redness is normal after a procedure but should go away as your would is healing. If the swelling and redness or pain increases or if any other signs of infection occur listed below please send in a photo via my chart and or call us to let us know.  The clear film should start peeling off approximately around 2 weeks. By this time your wound should be sufficiently healed. If it still looks to be healing when the glue comes off you can clean the wound with soapy warm water daily in the shower and apply Vaseline to the incision line.   Once the clear film starts peeling off to the point where water is getting trapped under the clear film, please gently peel off.        If you are able to take acetaminophen (\"Tylenol,\" etc.) and ibuprofen (\"Advil\" or \"Motrin,\" etc.), then you may STAGGER these medications by taking 400 mg of ibuprofen (usually " two tabs) every 8 hours and 1,000 mg of acetaminophen (e.g., two tabs of extra-strength Tylenol) every 8 hours.    This means, for example, that you could take the followin,000 mg of Tylenol, followed 4 hours later by 400 mg Ibuprofen, followed 4 hours later with 1,000 mg of Tylenol, followed 4 hours later by 400 mg Ibuprofen, followed 4 hours later with 1,000 mg of Tylenol, and so forth.     Essentially, you can take either 1,000 mg of Tylenol or 400 mg of ibuprofen in alternating fashion EVERY FOUR HOURS.    Do NOT exceed more than 4,000 mg of Tylenol or 3,200 mg of ibuprofen per 24 hours. If you are not able to take Tylenol or ibuprofen as above due to other health issues (or a physician has told you directly that you are not allowed to take one of them, say due to pre-existing severe liver or kidney issues), then disregard the above directions.    Scientific evidence supports that this combination/schedule of pain medications is just as effective, if not more effective, than taking a narcotic pain medicine.       Follow up will be a 3 month scar evaluation either in person or via a telephone visit with you sending in a photo via [employer]. Unless you have been told to follow up sooner or if you have concerns and would like to be see sooner. Please call or send us in a [employer] message if possible and attach a photo.        What to expect:    The first couple of days your wound may be tender and may bleed slightly when doing wound care.  There may be swelling and bruising around the wound, especially if it is near the eyes. For your comfort, you may apply ice or cold compresses to the bruises after your have removed the pressure bandage.  The area around your wound may be numb for several weeks or even months.  You may experience periodic sharp pain or mild itching around the wound as it heals.   The suture line will look dark for a while but will lighten over time.       When to call us:    You have bleeding  that will not stop after applying pressure and ice.  You have pain that is not controlled with Tylenol (acetaminophen.)  You have signs or symptoms of an infection such as:  Fever over 100 degrees Fahrenheit  Redness, warmth or foul-smelling drainage from the wound  If you have any questions, or are not sure how to take care of the wound.    Phone numbers:    During business hours (M-F 8:00-4:30 p.m.)  [facility]-  [phone] Option 1 appt. desk  [phone]  Option 3 nurse triage line  ---------------------------------------------------------  Evenings/Weekends/Holidays  Hospital - [phone]   TTY for hearing dgljzegk-[phone]  *Ask  to page dermatologist on-call  Emergency Lqdi-[phone]  TTY for hearing impaired- [phone]        Quality 110: Preventive Care And Screening: Influenza Immunization: Influenza Immunization Administered during Influenza season Detail Level: Generalized Quality 130: Documentation Of Current Medications In The Medical Record: Current Medications Documented

## 2023-10-03 NOTE — PROGRESS NOTES
DERMATOLOGIC EXCISION SURGERY PROCEDURE NOTE     Dermatology Problem List:  BCC R upper arm excised 10/3/23      NAME OF PROCEDURE: Excision with complex linear closure  Staff surgeon: eben  Resident: none  Scrub nurse: julius    PRE-OPERATIVE DIAGNOSIS:  Basal Cell Carcinoma  POST-OPERATIVE DIAGNOSIS: Same   LOCATION: R upper arm  FINAL EXCISION SIZE(DEFECT SIZE): 1.8x1.8 cm  MARGIN: 0.4 cm  FINAL REPAIR LENGTH: 5 cm   ANESTHESIA: 6xx 1% lidocaine with 1:100,000 epinephrine    INDICATIONS: This patient presented with a 1cm Basal Cell Carcinoma.. Excision was indicated.   We discussed the principles of treatment and most likely complications including bleeding, infection, scarring, alteration in skin color and sensation, wound dehiscence,muscle weakness in the area, or recurrence of the lesion or disease. We reviewed that on occasion, after healing, a secondary procedure or revision may be recommended in order to obtain the best cosmetic or functional result.   PROCEDURE: The patient was taken to the operative suite. Time-out was performed. The treatment area was anesthetized. The area was washed with Hibiclens, rinsed with saline and draped with sterile towels. The lesion was delineated and excised down to deep subcutaneous fat in a fusiform manner. Hemostasis was obtained by electrocoagulation.   REPAIR: A complex layered linear closure was selected as the procedure which would maximally preserve both function and cosmesis and for the following reasons: 1) the defect was extensively undermined; 2) multiple deep plication and layered sutures placed; 3) wound size, depth, tension, and location.   After the excision of the tumor, the area was extensively and carefully undermined using blunt Metzenbaum scissors. Hemostasis was obtained with spot electrocautery and ligation of vessels where necessary. An initial deep plication sutures of 4.0 monocryl sutures  were placed in the deep, subcutaneous and fascial planes  to appose the lateral margins.  The subcutaneous and dermal layers were then closed with additional 4.0 monocryl sutures. The epidermis was then carefully approximated along the length of the wound using 4.0 moncryl running subcuticular sutures.   Estimated blood loss was less than 10 ml for all surgical sites. A sterile pressure dressing was applied and wound care instructions, with a written handout, were given. The patient was discharged from the [facility] Center alert and ambulatory.  The patient elected for pathology results to automatically release and understands that the clinical staff will contact them as soon as possible to notify them of the results.   Follow up for suture removal in n/a and in dermatology clinic with MD in gen derm for skin checks in 6 months.    Dr. Vincent staffed the patient and was present for the key portions of the above procedure.   Staff Involved:  Staff Only    Attending attestation:  I personally performed the entire procedure.  I have reviewed the note and edited it as necessary, and agree with its contents.    Jim Vincent M.D.  Professor  Director of Dermatologic Surgery  Department of Dermatology  [facility]    Dermatology Surgery Clinic  [facility] [place] [phone]

## 2023-10-04 DIAGNOSIS — N52.9 VASCULOGENIC ERECTILE DYSFUNCTION, UNSPECIFIED VASCULOGENIC ERECTILE DYSFUNCTION TYPE: ICD-10-CM

## 2023-10-04 RX ORDER — SILDENAFIL CITRATE 20 MG/1
TABLET ORAL
Qty: 30 TABLET | Refills: 0 | Status: SHIPPED | OUTPATIENT
Start: 2023-10-04 | End: 2024-06-26

## 2023-10-04 NOTE — TELEPHONE ENCOUNTER
"Request for medication refill:    sildenafil (REVATIO) 20 MG tablet   providers if patient needs an appointment and you are willing to give a one month supply please refill for one month and  send a letter/MyChart using \".SMILLIMITEDREFILL\" .smillimited and route chart to \"[facility] \" (Giving one month refill in non controlled medications is strongly recommended before denial)    If refill has been denied, meaning absolutely no refills without visit, please complete the smart phrase \".smirxrefuse\" and route it to the \"[facility] MED REFILLS\"  pool to inform the patient and the pharmacy.    Mali Carrasco MA     "

## 2023-10-06 LAB
PATH REPORT.COMMENTS IMP SPEC: NORMAL
PATH REPORT.COMMENTS IMP SPEC: NORMAL
PATH REPORT.FINAL DX SPEC: NORMAL
PATH REPORT.GROSS SPEC: NORMAL
PATH REPORT.MICROSCOPIC SPEC OTHER STN: NORMAL
PATH REPORT.RELEVANT HX SPEC: NORMAL

## 2023-10-13 ENCOUNTER — OFFICE VISIT (OUTPATIENT)
Dept: DERMATOLOGY | Facility: CLINIC | Age: 53
End: 2023-10-13
Payer: COMMERCIAL

## 2023-10-13 DIAGNOSIS — D22.9 MULTIPLE BENIGN NEVI: ICD-10-CM

## 2023-10-13 DIAGNOSIS — D22.9 BENIGN NEVUS OF SKIN: ICD-10-CM

## 2023-10-13 DIAGNOSIS — L81.4 LENTIGINES: ICD-10-CM

## 2023-10-13 DIAGNOSIS — Z85.828 HISTORY OF NONMELANOMA SKIN CANCER: Primary | ICD-10-CM

## 2023-10-13 DIAGNOSIS — Z80.8 FAMILY HISTORY OF MALIGNANT MELANOMA: ICD-10-CM

## 2023-10-13 DIAGNOSIS — Z86.018 HISTORY OF DYSPLASTIC NEVUS: ICD-10-CM

## 2023-10-13 DIAGNOSIS — T81.30XA WOUND DEHISCENCE: ICD-10-CM

## 2023-10-13 DIAGNOSIS — L82.1 SEBORRHEIC KERATOSES: ICD-10-CM

## 2023-10-13 DIAGNOSIS — D18.01 CHERRY ANGIOMA: ICD-10-CM

## 2023-10-13 PROCEDURE — 99213 OFFICE O/P EST LOW 20 MIN: CPT | Mod: 24 | Performed by: DERMATOLOGY

## 2023-10-13 ASSESSMENT — PAIN SCALES - GENERAL: PAINLEVEL: NO PAIN (0)

## 2023-10-13 NOTE — PROGRESS NOTES
[facility] Dermatology Note  Encounter Date: Oct 13, 2023  Office Visit     Dermatology Problem List:  Last FBSE: 10/13/2023  1. BCC, right upper lateral arm, s/p excision 10/3/23  2. Hx dysplastic nevi  - Compound dysplastic nevus with moderate atypia, central upper back, s/p shave bx 2023; monitor for pigment recurrence  - Compound dysplastic nevus with mild atypia, left chest, s/p shave bx 2023  3. Family hx melanoma (father, MGF, both )  4. Apocrine hidrocystoma, right medial canthus, s/p optho excision 23     # Lesions to monitor, left mid frontal scalp and left scapha  - photo 2023, stable 10/13/23       ____________________________________________    Assessment & Plan:       #Wound dehiscence  At site of BCC excision on the right lateral upper arm.  No signs of infection today.  We will plan to place Steri-Strips and continue to cover with Tegaderm until healed.    # History of moderately dysplastic nevi x2. No evidence of recurrent disease.   # Multiple benign nevi.   # Fhx melanoma.  - Nevi of the left mid frontal scalp and left scapha of the ear stable compared to prior photos.  - Monitor for ABCDEs of melanoma   - Continue sun protection - recommend SPF 30 or higher with frequent application   - Return sooner if noticing changing or symptomatic lesions'    # Benign lesions - SKs, cherry angiomas, lentigenes.  - No treatment required    # History of NMSC. No evidence of recurrent disease.  - Continue photoprotection - recommend SPF 30 or higher with frequent reapplication  - Continue yearly skin exams  - Advised to monitor for changing, non-healing, bleeding, painful, changing, or otherwise symptomatic lesions    Procedures Performed:   -None    Follow-up: 1 year for full body skin exam, sooner if concerns    Staff: Dr. Minal Espitia MD PGY-3  Dermatology Resident    Staff Physician Comments:   I saw and evaluated the patient with the  resident and I agree with the assessment and plan.  I was present for the examination.    Celeste Fontaine MD    Department of Dermatology  [facility]: Phone: [phone], Fax: [phone]  10/19/2023     ____________________________________________    CC: Skin Check (Here today for a skin check. Spot of concern on left temple.)    HPI:  Mr. Lui Arrington is a(n) 53 year old male who presents today as a return patient for skin check. Had a BCC excised from the R upper lateral arm on 10/3/23.     Notes no concerns today. States that the excision site of the R upper arm opened up a bit. Not painful, warm or tender. He has placed a tegaderm over it.     Denies any bleeding, painful or growing skin lesions.     Patient is otherwise feeling well, without additional skin concerns.    Labs Reviewed:  N/A    Physical Exam:  Vitals: There were no vitals taken for this visit.  SKIN: Full body skin exam excluding the genitals was performed including face, scalp, neck, ears, chest, back, bilateral arms, hands, bilateral legs, feet, and buttocks.   - on the L parietal scalp and R vertex scalp there are pink regular papules that are bland on dermoscopy  - Multiple somewhat irregular brown macules and papules on the trunk and extremities and L scapha of the ear but similar in appearance and reassuring dermoscopy   - several waxy stuck on appearing tan papules on the scalp, L temple, and trunk and extremities  - no evidence of repigmentation on the atrophic plaques on the upper central back and L central chest  - There are dome shaped bright red papules on the trunk and extremities .   - Scattered brown macules on sun exposed areas.  - Waxy stuck on papules and plaques on trunk and extremities.   - No other lesions of concern on areas examined.     Medications:  Current Outpatient Medications   Medication    aspirin 81 MG EC tablet     atorvastatin (LIPITOR) 40 MG tablet    buPROPion (WELLBUTRIN XL) 150 MG 24 hr tablet    diphenoxylate-atropine (LOMOTIL) 2.5-0.025 MG tablet    fluticasone (FLONASE) 50 MCG/ACT nasal spray    losartan (COZAAR) 100 MG tablet    sildenafil (REVATIO) 20 MG tablet     Current Facility-Administered Medications   Medication    erythromycin (ROMYCIN) ophthalmic ointment    lidocaine-EPINEPHrine 1 %-1:411080 injection 1 mL      Past Medical History:   Patient Active Problem List   Diagnosis    Benign essential hypertension    Coronary artery disease involving native coronary artery of native heart without angina pectoris    Cerebral aneurysm, nonruptured    Numbness of fingers of both hands    Hypertrophy of both inferior nasal turbinates    Nasal obstruction    BMI 37.0-37.9, adult    Bradycardia    Status post coronary angiogram    Class 2 severe obesity with serious comorbidity and body mass index (BMI) of 36.0 to 36.9 in adult (H)     Past Medical History:   Diagnosis Date    Brain aneurysm     Small    Coronary artery disease     Diverticulosis     Seasonal allergies     Substance dependence, in remission (H) 1993       CC Celeste Fontaine MD   DERMATOLOGY  [address] [place], [place] [address] [address] on close of this encounter.

## 2023-10-13 NOTE — PATIENT INSTRUCTIONS

## 2023-10-13 NOTE — NURSING NOTE
Dermatology Rooming Note    Lui Arrington's goals for this visit include:   Chief Complaint   Patient presents with    Skin Check     Here today for a skin check. Spot of concern on left temple.     Jeanette Montilla RN

## 2023-10-13 NOTE — LETTER
10/13/2023       RE: Lui Arrington  [address] [place] [address]     Dear Colleague,    Thank you for referring your patient, Lui Arrington, to the [facility] DERMATOLOGY [facility] [place] [facility]. Please see a copy of my visit note below.    [facility] Dermatology Note  Encounter Date: Oct 13, 2023  Office Visit     Dermatology Problem List:  Last FBSE: 10/13/2023  1. BCC, right upper lateral arm, s/p excision 10/3/23  2. Hx dysplastic nevi  - Compound dysplastic nevus with moderate atypia, central upper back, s/p shave bx 2023; monitor for pigment recurrence  - Compound dysplastic nevus with mild atypia, left chest, s/p shave bx 2023  3. Family hx melanoma (father, MGF, both )  4. Apocrine hidrocystoma, right medial canthus, s/p optho excision 23     # Lesions to monitor, left mid frontal scalp and left scapha  - photo 2023, stable 10/13/23       ____________________________________________    Assessment & Plan:       #Wound dehiscence  At site of BCC excision on the right lateral upper arm.  No signs of infection today.  We will plan to place Steri-Strips and continue to cover with Tegaderm until healed.    # History of moderately dysplastic nevi x2. No evidence of recurrent disease.   # Multiple benign nevi.   # Fhx melanoma.  - Nevi of the left mid frontal scalp and left scapha of the ear stable compared to prior photos.  - Monitor for ABCDEs of melanoma   - Continue sun protection - recommend SPF 30 or higher with frequent application   - Return sooner if noticing changing or symptomatic lesions'    # Benign lesions - SKs, cherry angiomas, lentigenes.  - No treatment required    # History of NMSC. No evidence of recurrent disease.  - Continue photoprotection - recommend SPF 30 or higher with frequent reapplication  - Continue yearly skin exams  - Advised to monitor for changing,  non-healing, bleeding, painful, changing, or otherwise symptomatic lesions    Procedures Performed:   -None    Follow-up: 1 year for full body skin exam, sooner if concerns    Staff: Dr. Minal Espitia MD PGY-3  Dermatology Resident    Staff Physician Comments:   I saw and evaluated the patient with the resident and I agree with the assessment and plan.  I was present for the examination.    Celeste Fontaine MD    Department of Dermatology  [facility]: Phone: [phone], Fax: [phone]  10/19/2023     ____________________________________________    CC: Skin Check (Here today for a skin check. Spot of concern on left temple.)    HPI:  Mr. Lui Arrington is a(n) 53 year old male who presents today as a return patient for skin check. Had a BCC excised from the R upper lateral arm on 10/3/23.     Notes no concerns today. States that the excision site of the R upper arm opened up a bit. Not painful, warm or tender. He has placed a tegaderm over it.     Denies any bleeding, painful or growing skin lesions.     Patient is otherwise feeling well, without additional skin concerns.    Labs Reviewed:  N/A    Physical Exam:  Vitals: There were no vitals taken for this visit.  SKIN: Full body skin exam excluding the genitals was performed including face, scalp, neck, ears, chest, back, bilateral arms, hands, bilateral legs, feet, and buttocks.   - on the L parietal scalp and R vertex scalp there are pink regular papules that are bland on dermoscopy  - Multiple somewhat irregular brown macules and papules on the trunk and extremities and L scapha of the ear but similar in appearance and reassuring dermoscopy   - several waxy stuck on appearing tan papules on the scalp, L temple, and trunk and extremities  - no evidence of repigmentation on the atrophic plaques on the upper central back and L central chest  -  There are dome shaped bright red papules on the trunk and extremities .   - Scattered brown macules on sun exposed areas.  - Waxy stuck on papules and plaques on trunk and extremities.   - No other lesions of concern on areas examined.     Medications:  Current Outpatient Medications   Medication     aspirin 81 MG EC tablet     atorvastatin (LIPITOR) 40 MG tablet     buPROPion (WELLBUTRIN XL) 150 MG 24 hr tablet     diphenoxylate-atropine (LOMOTIL) 2.5-0.025 MG tablet     fluticasone (FLONASE) 50 MCG/ACT nasal spray     losartan (COZAAR) 100 MG tablet     sildenafil (REVATIO) 20 MG tablet     Current Facility-Administered Medications   Medication     erythromycin (ROMYCIN) ophthalmic ointment     lidocaine-EPINEPHrine 1 %-1:208999 injection 1 mL      Past Medical History:   Patient Active Problem List   Diagnosis     Benign essential hypertension     Coronary artery disease involving native coronary artery of native heart without angina pectoris     Cerebral aneurysm, nonruptured     Numbness of fingers of both hands     Hypertrophy of both inferior nasal turbinates     Nasal obstruction     BMI 37.0-37.9, adult     Bradycardia     Status post coronary angiogram     Class 2 severe obesity with serious comorbidity and body mass index (BMI) of 36.0 to 36.9 in adult (H)     Past Medical History:   Diagnosis Date     Brain aneurysm     Small     Coronary artery disease      Diverticulosis      Seasonal allergies      Substance dependence, in remission (H) 1993       CC Celeste Fontaine MD   DERMATOLOGY  [address] [place], [place] [id number], [place] [address] on close of this encounter.      Again, thank you for allowing me to participate in the care of your patient.      Sincerely,    Celeste Fontaine MD

## 2023-10-16 NOTE — PROGRESS NOTES
"Video-Visit Details    Type of service:  Video Visit    Video Start Time: 12:59 pm   Video End Time: 1:31 pm    Originating Location (pt. Location): Home    Distant Location (provider location):  Offsite (providers home    Platform used for Video Visit: [employer]      New Weight Management Nutrition Consultation    Lui Arrington is a 53 year old male presents today for new weight management nutrition consultation.  Patient referred by Korin Gomez PA-C on 2023.    Patient with Co-morbidities of obesity includin/19/2023    10:44 AM   --   I have the following health issues associated with obesity High Blood Pressure   I have the following symptoms associated with obesity None of the above     Hx of drug and alcohol misuse      Anthropometrics:  Weight 23: 287 lbs with BMI 36.36    Estimated body mass index is 35.75 kg/m  as calculated from the following:    Height as of this encounter: 1.905 m (6' 3\").    Weight as of this encounter: 129.7 kg (286 lb).    Current weight: 286 lbs, pt report    Medications for Weight Loss:  Wellbutrin    NUTRITION HISTORY  Previous methods of diet modification for weight loss: WW, low kcal diet,   RD before: No     Pt goals: lose fat while becoming more fit, improve mobility/strength/stamina, live longer, feel better   - Wants help with compliance to nutrition goals/information     Trying to track intake in Lose it jaime - hard to stick to at times. Following 2146 during week, 2446 on weekend currently     Hx of weight gain through sobriety. Weight impacted by family hx, emotional eating, and stress.     Per [facility] note   \"Eating 3 meals a day, with 2 snacks daily. Unsure if eating out of hunger or due to emotional. At times is hungry between meals. Job is stressful, and thinks will eat out of a response. Tries to buy individual packaged snacks. Feels like he is \"addicted\" to fast food, but is able to abstain most of the time. Can get full and can " "stay full. Craves carbs and fats. Eats out 2-3xweek for work.      Activity - is meeting with a  next week. Walking 3-6 miles, pelaton bike and work outs. 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 Bagel with cream cheese, or fruit/yogurt/granola.  Pancakes or French Toast weekly.  Eggs and hashbrowns 2x/month   If you do eat lunch, what types of food do you typically eat? Widely varied - ethnic restaurant or restaurant salad, or sandwich and chips at home   If you do eat supper, what types of food do you typically eat? No pattern - from popcorn or cheese and crackers to full meals.   If you do snack, what types of food do you typically eat? Carbohydrates.  Too often gas station ultraprocessed food.  Sometimes fruit, cheese and crackers, or beef jerky   How many glasses of juice do you drink in a typical day? 0   How many of glasses of milk do you drink in a typical day? 0   How many 8oz glasses of sugar containing drinks such as Raymond-Aid/sweet tea do you drink in a day? 0   How many cans/bottles of sugar pop/soda/tea/sports drinks do you drink in a day? 0.5   How many cans/bottles of diet pop/soda/tea or sports drink do you drink in a day? 1.5   How often do you have a drink of alcohol? Never           9/19/2023    10:44 AM   Eating Habits   Generally, my meals include foods like these bread, pasta, rice, potatoes, corn, crackers, sweet dessert, pop, or juice Everyday   Generally, my meals include foods like these fried meats, brats, burgers, french fries, pizza, cheese, " chips, or ice cream A Few Times a Week   Eat fast food (like McDonalds, Burger Mike, Taco Bell) Once a Week   Eat at a buffet or sit-down restaurant A Few Times a Week   Often skip meals, eat at random times, have no regular eating times A Few Times a Week   Rarely sit down for a meal but snack or graze throughout Less Than Weekly   Eat extra snacks between meals Almost Everyday   Eat most of my food at the end of the day A Few Times a Week   Eat in the middle of the night or wake up at night to eat Never   Eat extra snacks to prevent or correct low blood sugar Never   Eat to prevent acid reflux or stomach pain Never   Worry about not having enough food to eat A Few Times a Week   I eat when I am depressed Less Than Weekly   I eat when I am stressed A Few Times a Week   I eat when I am bored A Few Times a Week   I eat when I am anxious A Few Times a Week   I eat when I am happy or as a reward A Few Times a Week   I feel hungry all the time even if I just have eaten Never   Feeling full is important to me Less Than Weekly   I finish all the food on my plate even if I am already full Never   I eat/snack without noticing that I am eating A Few Times a Week   I eat when I am preparing the meal A Few Times a Week   I eat more than usual when I see others eating Never   I have trouble not eating sweets, ice cream, cookies, or chips if they are around the house Never   I think about food all day Almost Everyday   What foods, if any, do you crave? Chips/Crackers   Please list any other foods you crave? Bread/complex carbohydrates, and red meat           9/19/2023    10:44 AM   Amount of Food   I feel out of control when eating Weekly   I eat a large amount of food, like a loaf of bread, a box of cookies, a pint/quart of ice cream, all at once Monthly   I eat a large amount of food even when I am not hungry Never   I eat rapidly Almost Everyday   I eat alone because I feel embarrassed and do not want others to see how much I  have eaten Monthly   I eat until I am uncomfortably full Weekly   I feel bad, disgusted, or guilty after I overeat Weekly       Physical Activity:        9/19/2023    10:44 AM   Activity/Exercise History   How much of a typical 12 hour day do you spend sitting? Most of the Day   How much of a typical 12 hour day do you spend lying down? Less Than Half the Day   How much of a typical day do you spend walking/standing? Less Than Half the Day   How many hours (not including work) do you spend on the TV/Video Games/Computer/Tablet/Phone? 2-3 Hours   How many times a week are you active for the purpose of exercise? 2-3 Times a Week   What keeps you from being more active? Lack of Time    Too tired   How many total minutes do you spend doing some activity for the purpose of exercising when you exercise? More Than 30 Minutes       Nutrition Prescription  Recommended energy/nutrient modification.    Nutrition Diagnosis  Obesity r/t long history of positive energy balance aeb BMI >30.    Nutrition Intervention  Reviewed current dietary habits and pts history   Discussed long-term goals pt hopes to accomplish in RD appointments  Answered pt questions  Coordination of care   Nutrition education   AVS and handouts via [email or web address]    Expected Engagement: good    Nutrition Goals/Resources  Continue with 4 workouts/week, 20+ minutes each workout. (Walking, peloton, stairs, strength training)   Aim for fast food 2x/month   Aim to increase hydration - monitor urine status, aiming for clear/light yellow   Consider utilizing a hunger/satiety scale. Example: Berkely s scale online    Estimated kcal needs for weight loss:   ~2400 kcal at low activity     Dietary guidelines for Americans   45-65% Carbohydrates   10-35% Protein  20-35% Fat    General weight loss recommendations   40% Carbohydrates   30% Protein   30% Fat    Gas station ideas:   - Greek yogurt  - Fruit  - Vegetable packs  - Nuts  - Beef jerky  - Turkey sticks  - Whole wheat  crackers or popcorn if needing some carbohydrates     Follow-Up:  4-6 weeks    Time spent with patient: 32 minutes.  JEWEL Sapp, RD, LD

## 2023-10-17 ENCOUNTER — VIRTUAL VISIT (OUTPATIENT)
Dept: ENDOCRINOLOGY | Facility: CLINIC | Age: 53
End: 2023-10-17
Payer: COMMERCIAL

## 2023-10-17 VITALS — HEIGHT: 75 IN | BODY MASS INDEX: 35.56 KG/M2 | WEIGHT: 286 LBS

## 2023-10-17 DIAGNOSIS — E66.812 CLASS 2 SEVERE OBESITY WITH SERIOUS COMORBIDITY AND BODY MASS INDEX (BMI) OF 36.0 TO 36.9 IN ADULT, UNSPECIFIED OBESITY TYPE (H): ICD-10-CM

## 2023-10-17 DIAGNOSIS — Z71.3 NUTRITIONAL COUNSELING: Primary | ICD-10-CM

## 2023-10-17 DIAGNOSIS — E66.01 CLASS 2 SEVERE OBESITY WITH SERIOUS COMORBIDITY AND BODY MASS INDEX (BMI) OF 36.0 TO 36.9 IN ADULT, UNSPECIFIED OBESITY TYPE (H): ICD-10-CM

## 2023-10-17 PROCEDURE — 99207 PR NO CHARGE LOS: CPT | Mod: VID | Performed by: DIETITIAN, REGISTERED

## 2023-10-17 PROCEDURE — 97802 MEDICAL NUTRITION INDIV IN: CPT | Mod: VID | Performed by: DIETITIAN, REGISTERED

## 2023-10-17 ASSESSMENT — PAIN SCALES - GENERAL: PAINLEVEL: MILD PAIN (3)

## 2023-10-17 NOTE — LETTER
"10/17/2023       RE: Lui Arrington  [address] [place] [address]     Dear Colleague,    Thank you for referring your patient, Lui Arrington, to the [facility]NAGEMENT [facility] [place] at [facility]. Please see a copy of my visit note below.    Video-Visit Details    Type of service:  Video Visit    Video Start Time: 12:59 pm   Video End Time: 1:31 pm    Originating Location (pt. Location): Home    Distant Location (provider location):  Offsite (providers home    Platform used for Video Visit: [facility]      New Weight Management Nutrition Consultation    Lui Arrington is a 53 year old male presents today for new weight management nutrition consultation.  Patient referred by Korin Gomez PA-C on 2023.    Patient with Co-morbidities of obesity includin/19/2023    10:44 AM   --   I have the following health issues associated with obesity High Blood Pressure   I have the following symptoms associated with obesity None of the above     Hx of drug and alcohol misuse      Anthropometrics:  Weight 23: 287 lbs with BMI 36.36    Estimated body mass index is 35.75 kg/m  as calculated from the following:    Height as of this encounter: 1.905 m (6' 3\").    Weight as of this encounter: 129.7 kg (286 lb).    Current weight: 286 lbs, pt report    Medications for Weight Loss:  Wellbutrin    NUTRITION HISTORY  Previous methods of diet modification for weight loss: WW, low kcal diet,   RD before: No     Pt goals: lose fat while becoming more fit, improve mobility/strength/stamina, live longer, feel better   - Wants help with compliance to nutrition goals/information     Trying to track intake in Lose it jaime - hard to stick to at times. Following 2146 during week, 2446 on weekend currently     Hx of weight gain through sobriety. Weight impacted by family hx, emotional eating, and stress.     Per [facility] note " "9/22  \"Eating 3 meals a day, with 2 snacks daily. Unsure if eating out of hunger or due to emotional. At times is hungry between meals. Job is stressful, and thinks will eat out of a response. Tries to buy individual packaged snacks. Feels like he is \"addicted\" to fast food, but is able to abstain most of the time. Can get full and can stay full. Craves carbs and fats. Eats out 2-3xweek for work.      Activity - is meeting with a  next week. Walking 3-6 miles, pelaton bike and work outs. 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 Bagel with cream cheese, or fruit/yogurt/granola.  Pancakes or Bulgarian Toast weekly.  Eggs and hashbrowns 2x/month   If you do eat lunch, what types of food do you typically eat? Widely varied - ethnic restaurant or restaurant salad, or sandwich and chips at home   If you do eat supper, what types of food do you typically eat? No pattern - from popcorn or cheese and crackers to full meals.   If you do snack, what types of food do you typically eat? Carbohydrates.  Too often gas station ultraprocessed food.  Sometimes fruit, cheese and crackers, or beef jerky   How many glasses of juice do you drink in a typical day? 0   How many of glasses of milk do you drink in a typical day? 0   How many 8oz glasses of sugar containing drinks such as Raymond-Aid/sweet tea do you drink in a day? 0   How many cans/bottles of sugar pop/soda/tea/sports drinks do you drink in a day? 0.5   How many cans/bottles of diet pop/soda/tea or sports drink do you drink in a day? " 1.5   How often do you have a drink of alcohol? Never           9/19/2023    10:44 AM   Eating Habits   Generally, my meals include foods like these bread, pasta, rice, potatoes, corn, crackers, sweet dessert, pop, or juice Everyday   Generally, my meals include foods like these fried meats, brats, burgers, french fries, pizza, cheese, chips, or ice cream A Few Times a Week   Eat fast food (like McDonalds, [employer], Taco Bell) Once a Week   Eat at a buffet or sit-down restaurant A Few Times a Week   Often skip meals, eat at random times, have no regular eating times A Few Times a Week   Rarely sit down for a meal but snack or graze throughout Less Than Weekly   Eat extra snacks between meals Almost Everyday   Eat most of my food at the end of the day A Few Times a Week   Eat in the middle of the night or wake up at night to eat Never   Eat extra snacks to prevent or correct low blood sugar Never   Eat to prevent acid reflux or stomach pain Never   Worry about not having enough food to eat A Few Times a Week   I eat when I am depressed Less Than Weekly   I eat when I am stressed A Few Times a Week   I eat when I am bored A Few Times a Week   I eat when I am anxious A Few Times a Week   I eat when I am happy or as a reward A Few Times a Week   I feel hungry all the time even if I just have eaten Never   Feeling full is important to me Less Than Weekly   I finish all the food on my plate even if I am already full Never   I eat/snack without noticing that I am eating A Few Times a Week   I eat when I am preparing the meal A Few Times a Week   I eat more than usual when I see others eating Never   I have trouble not eating sweets, ice cream, cookies, or chips if they are around the house Never   I think about food all day Almost Everyday   What foods, if any, do you crave? Chips/Crackers   Please list any other foods you crave? Bread/complex carbohydrates, and red meat           9/19/2023    10:44 AM   Amount of Food    I feel out of control when eating Weekly   I eat a large amount of food, like a loaf of bread, a box of cookies, a pint/quart of ice cream, all at once Monthly   I eat a large amount of food even when I am not hungry Never   I eat rapidly Almost Everyday   I eat alone because I feel embarrassed and do not want others to see how much I have eaten Monthly   I eat until I am uncomfortably full Weekly   I feel bad, disgusted, or guilty after I overeat Weekly       Physical Activity:        9/19/2023    10:44 AM   Activity/Exercise History   How much of a typical 12 hour day do you spend sitting? Most of the Day   How much of a typical 12 hour day do you spend lying down? Less Than Half the Day   How much of a typical day do you spend walking/standing? Less Than Half the Day   How many hours (not including work) do you spend on the TV/Video Games/Computer/Tablet/Phone? 2-3 Hours   How many times a week are you active for the purpose of exercise? 2-3 Times a Week   What keeps you from being more active? Lack of Time    Too tired   How many total minutes do you spend doing some activity for the purpose of exercising when you exercise? More Than 30 Minutes       Nutrition Prescription  Recommended energy/nutrient modification.    Nutrition Diagnosis  Obesity r/t long history of positive energy balance aeb BMI >30.    Nutrition Intervention  Reviewed current dietary habits and pts history   Discussed long-term goals pt hopes to accomplish in RD appointments  Answered pt questions  Coordination of care   Nutrition education   AVS and handouts via [employer]    Expected Engagement: good    Nutrition Goals/Resources  Continue with 4 workouts/week, 20+ minutes each workout. (Walking, peloton, stairs, strength training)   Aim for fast food 2x/month   Aim to increase hydration - monitor urine status, aiming for clear/light yellow   Consider utilizing a hunger/satiety scale. Example: Berkely s scale online    Estimated kcal needs  for weight loss:   ~2400 kcal at low activity     Dietary guidelines for Americans   45-65% Carbohydrates   10-35% Protein  20-35% Fat    General weight loss recommendations   40% Carbohydrates   30% Protein   30% Fat    Gas station ideas:   - Greek yogurt  - Fruit  - Vegetable packs  - Nuts  - Beef jerky  - Turkey sticks  - Whole wheat crackers or popcorn if needing some carbohydrates     Follow-Up:  4-6 weeks    Time spent with patient: 32 minutes.  JEWEL Sapp, RD, LD

## 2023-10-17 NOTE — NURSING NOTE
Is the patient currently in the state of [place]? YES    Visit mode:VIDEO    If the visit is dropped, the patient can be reconnected by: VIDEO VISIT: Text to cell phone:   Telephone Information:   Mobile [phone]       Will anyone else be joining the visit? NO  (If patient encounters technical issues they should call [phone]    How would you like to obtain your AVS? MyChart    Are changes needed to the allergy or medication list? No    Pt states cold coming on so headache 3/10 and basal cell carcinoma removed from right shoulder--incision sore also 3/10 today.    Reason for visit: Consult    Aldo HOUSTON

## 2023-10-17 NOTE — PATIENT INSTRUCTIONS
Nutrition Goals/Resources  Continue with 4 workouts/week, 20+ minutes each workout. (Walking, peloton, stairs, strength training)   Aim for fast food 2x/month   Aim to increase hydration - monitor urine status, aiming for clear/light yellow   Consider utilizing a hunger/satiety scale. Example: Berkely s scale online    Estimated kcal needs for weight loss:   ~2400 kcal at low activity     Dietary guidelines for Americans   45-65% Carbohydrates   10-35% Protein  20-35% Fat    General weight loss recommendations   40% Carbohydrates   30% Protein   30% Fat    Gas station ideas:   - Greek yogurt  - Fruit  - Vegetable packs  - Nuts  - Beef jerky  - Turkey sticks  - Whole wheat crackers or popcorn if needing some carbohydrates     Follow-Up:  4-6 weeks    JEWEL Cook, RD, LD  Clinic #: [phone]

## 2023-10-18 ENCOUNTER — TELEPHONE (OUTPATIENT)
Dept: ENDOCRINOLOGY | Facility: CLINIC | Age: 53
End: 2023-10-18
Payer: COMMERCIAL

## 2023-10-18 NOTE — TELEPHONE ENCOUNTER
Patient interested in 24 week health coaching (per Missy Chandler).  Gave Christina Heredia's name and scheduling # [phone].

## 2023-10-22 DIAGNOSIS — E78.49 OTHER HYPERLIPIDEMIA: ICD-10-CM

## 2023-10-24 RX ORDER — ATORVASTATIN CALCIUM 40 MG/1
TABLET, FILM COATED ORAL
Qty: 90 TABLET | Refills: 0 | Status: SHIPPED | OUTPATIENT
Start: 2023-10-24 | End: 2024-04-04

## 2023-10-24 NOTE — TELEPHONE ENCOUNTER
"Request for medication refill: atorvastatin (LIPITOR) 40 MG tablet     Providers if patient needs an appointment and you are willing to give a one month supply please refill for one month and  send a letter/MyChart using \".SMILLIMITEDREFILL\" .smillimited and route chart to \"P [facility] \" (Giving one month refill in non controlled medications is strongly recommended before denial)    If refill has been denied, meaning absolutely no refills without visit, please complete the smart phrase \".smirxrefuse\" and route it to the \"P [facility] MED REFILLS\"  pool to inform the patient and the pharmacy.    MALIA GREENWOOD      "

## 2024-04-04 DIAGNOSIS — E78.49 OTHER HYPERLIPIDEMIA: ICD-10-CM

## 2024-04-04 RX ORDER — ATORVASTATIN CALCIUM 40 MG/1
TABLET, FILM COATED ORAL
Qty: 90 TABLET | Refills: 0 | Status: SHIPPED | OUTPATIENT
Start: 2024-04-04 | End: 2024-07-01

## 2024-04-04 NOTE — TELEPHONE ENCOUNTER
"Request for medication refill:    atorvastatin (LIPITOR) 40 MG tablet     Providers if patient needs an appointment and you are willing to give a one month supply please refill for one month and  send a letter/MyChart using \".SMILLIMITEDREFILL\" .smillimited and route chart to \"P [facility] \" (Giving one month refill in non controlled medications is strongly recommended before denial)    If refill has been denied, meaning absolutely no refills without visit, please complete the smart phrase \".smirxrefuse\" and route it to the \"P [facility] MED REFILLS\"  pool to inform the patient and the pharmacy.    Daisy Leonard MA     "

## 2024-04-05 ENCOUNTER — TELEPHONE (OUTPATIENT)
Dept: CARDIOLOGY | Facility: CLINIC | Age: 54
End: 2024-04-05
Payer: COMMERCIAL

## 2024-04-05 NOTE — TELEPHONE ENCOUNTER
Patient confirmed scheduled appointment:  Date: 05/10/24  Time: 8 am  Visit type:   Provider: ene  Location: [facility]  Testing/imaging: echo on 05/08/24  Additional notes: n/a

## 2024-04-25 ENCOUNTER — MYC MEDICAL ADVICE (OUTPATIENT)
Dept: CARDIOLOGY | Facility: CLINIC | Age: 54
End: 2024-04-25
Payer: COMMERCIAL

## 2024-04-25 DIAGNOSIS — J01.10 ACUTE NON-RECURRENT FRONTAL SINUSITIS: ICD-10-CM

## 2024-04-25 RX ORDER — FLUTICASONE PROPIONATE 50 MCG
1 SPRAY, SUSPENSION (ML) NASAL DAILY PRN
Qty: 48 ML | Refills: 1 | Status: SHIPPED | OUTPATIENT
Start: 2024-04-25

## 2024-04-25 NOTE — TELEPHONE ENCOUNTER
"Request for medication refill:  fluticasone (FLONASE) 50 MCG/ACT nasal spray   Providers if patient needs an appointment and you are willing to give a one month supply please refill for one month and  send a letter/MyChart using \".SMILLIMITEDREFILL\" .smillimited and route chart to \"P [facility] \" (Giving one month refill in non controlled medications is strongly recommended before denial)    If refill has been denied, meaning absolutely no refills without visit, please complete the smart phrase \".smirxrefuse\" and route it to the \"P [facility] MED REFILLS\"  pool to inform the patient and the pharmacy.    Daisy Leonard MA     "

## 2024-05-08 ENCOUNTER — ANCILLARY PROCEDURE (OUTPATIENT)
Dept: CARDIOLOGY | Facility: CLINIC | Age: 54
End: 2024-05-08
Attending: NURSE PRACTITIONER
Payer: COMMERCIAL

## 2024-05-08 DIAGNOSIS — R06.83 SNORING: ICD-10-CM

## 2024-05-08 DIAGNOSIS — I25.10 CORONARY ARTERY DISEASE INVOLVING NATIVE CORONARY ARTERY OF NATIVE HEART WITHOUT ANGINA PECTORIS: ICD-10-CM

## 2024-05-08 LAB — LVEF ECHO: NORMAL

## 2024-05-08 PROCEDURE — 93306 TTE W/DOPPLER COMPLETE: CPT | Performed by: INTERNAL MEDICINE

## 2024-05-08 PROCEDURE — 99207 PR STATISTIC IV PUSH SINGLE INITIAL SUBSTANCE: CPT | Performed by: INTERNAL MEDICINE

## 2024-05-08 RX ADMIN — Medication 5 ML: at 10:24

## 2024-05-09 NOTE — TELEPHONE ENCOUNTER
Discussed via MyChart with patient to review recent elevated blood pressure reading. Per [place] Community Measures guidelines, patients blood pressure is out of parameters and recheck blood pressure is recommended.    Last Blood Pressure: 150/80  Date: 10/3/23  Location: Other Specialty    Most Recent Blood Pressure(s): 133/70 and 127/40  Date: 4/27/24  Location: Home BP    Patient reported blood pressure updated in Epic. Blood pressure falls within [place] Community Measures guidelines.  Patient will follow up as previously advised.

## 2024-06-15 ENCOUNTER — HEALTH MAINTENANCE LETTER (OUTPATIENT)
Age: 54
End: 2024-06-15

## 2024-06-25 DIAGNOSIS — N52.9 VASCULOGENIC ERECTILE DYSFUNCTION, UNSPECIFIED VASCULOGENIC ERECTILE DYSFUNCTION TYPE: ICD-10-CM

## 2024-06-25 NOTE — TELEPHONE ENCOUNTER
"Request for medication refill:  sildenafil (REVATIO) 20 MG tablet     Providers if patient needs an appointment and you are willing to give a one month supply please refill for one month and  send a letter/MyChart using \".SMILLIMITEDREFILL\" .smillimited and route chart to \"P [facility] \" (Giving one month refill in non controlled medications is strongly recommended before denial)    If refill has been denied, meaning absolutely no refills without visit, please complete the smart phrase \".smirxrefuse\" and route it to the \"P [facility] MED REFILLS\"  pool to inform the patient and the pharmacy.    Nano Ochoa, CMA      "

## 2024-06-26 RX ORDER — SILDENAFIL CITRATE 20 MG/1
TABLET ORAL
Qty: 30 TABLET | Refills: 0 | Status: SHIPPED | OUTPATIENT
Start: 2024-06-26 | End: 2024-09-18

## 2024-07-01 DIAGNOSIS — E78.49 OTHER HYPERLIPIDEMIA: ICD-10-CM

## 2024-07-01 RX ORDER — ATORVASTATIN CALCIUM 40 MG/1
TABLET, FILM COATED ORAL
Qty: 90 TABLET | Refills: 0 | Status: SHIPPED | OUTPATIENT
Start: 2024-07-01

## 2024-07-01 NOTE — TELEPHONE ENCOUNTER
"Request for medication refill:  atorvastatin (LIPITOR) 40 MG tablet     Providers if patient needs an appointment and you are willing to give a one month supply please refill for one month and  send a letter/MyChart using \".SMILLIMITEDREFILL\" .smillimited and route chart to \"P [facility] \" (Giving one month refill in non controlled medications is strongly recommended before denial)    If refill has been denied, meaning absolutely no refills without visit, please complete the smart phrase \".smirxrefuse\" and route it to the \"P [facility] MED REFILLS\"  pool to inform the patient and the pharmacy.    Nano Ochoa, CMA      "

## 2024-09-18 DIAGNOSIS — N52.9 VASCULOGENIC ERECTILE DYSFUNCTION, UNSPECIFIED VASCULOGENIC ERECTILE DYSFUNCTION TYPE: ICD-10-CM

## 2024-09-18 RX ORDER — SILDENAFIL CITRATE 20 MG/1
TABLET ORAL
Qty: 30 TABLET | Refills: 0 | Status: SHIPPED | OUTPATIENT
Start: 2024-09-18

## 2024-09-18 NOTE — TELEPHONE ENCOUNTER
"Request for medication refill: sildenafil (REVATIO) 20 MG tablet     Providers if patient needs an appointment and you are willing to give a one month supply please refill for one month and  send a letter/MyChart using \".SMILLIMITEDREFILL\" .smillimited and route chart to \"P [facility] \" (Giving one month refill in non controlled medications is strongly recommended before denial)    If refill has been denied, meaning absolutely no refills without visit, please complete the smart phrase \".smirxrefuse\" and route it to the \"P [facility] MED REFILLS\"  pool to inform the patient and the pharmacy.    Abena Sy, CMA    "

## 2024-10-15 NOTE — PROGRESS NOTES
[facility] Dermatology Note  Encounter Date: Oct 16, 2024  Office Visit     Dermatology Problem List:  Last FBSE: 10/16/2024  #NUB, L upper chest, shave bx 10/16/2024    1. BCC, right upper lateral arm, s/p excision 10/3/23  2. Hx dysplastic nevi  - Compound dysplastic nevus with moderate atypia, central upper back, s/p shave bx 2023; monitor for pigment recurrence  - Compound dysplastic nevus with mild atypia, left chest, s/p shave bx 2023  3. Family hx melanoma (father, MGF, both )  4. 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  - Advised daily moisturization with bland emollient.   - notify me if would like topical steroid    # Multiple benign nevi.   - Monitor for ABCDEs of melanoma   - Continue sun protection - recommend SPF 30 or higher with frequent application   - Return sooner if noticing changing or symptomatic lesions     # Benign lesions - SKs, cherry angiomas, lentigenes.  - No treatment required     # History of moderately dysplastic nevi x2. No evidence of recurrent disease.   # Multiple benign nevi.   # Fhx melanoma.  - Nevi of the left mid frontal scalp and left scapha of the ear stable compared to prior photos.  - Monitor for ABCDEs of melanoma   - Continue sun protection - recommend SPF 30 or higher with frequent application   - Return sooner if noticing changing or symptomatic lesions      # History of NMSC. No evidence of recurrent disease.  - Continue photoprotection - recommend SPF 30 or higher with frequent reapplication  - Continue yearly skin exams  - Advised to monitor for changing, non-healing, bleeding, painful, changing, or  otherwise symptomatic lesions      Procedures Performed:   - Shave biopsy: After discussion of benefits and risks including but not limited to bleeding, infection, scar, incomplete removal, recurrence, and non-diagnostic biopsy, written consent and photographs were obtained. The area was cleaned with isopropyl alcohol. < 1mL of 1% lidocaine with epinephrine was injected to obtain adequate anesthesia. A shave biopsy was performed. Hemostasis was achieved with aluminium chloride. Vaseline and a sterile dressing were applied. The patient tolerated the procedure and no complications were noted. The patient was provided with verbal and written post care instructions.     Cryotherapy procedure note: After verbal consent and discussion of risks and benefits including but no limited to dyspigmentation/scar, blister, and pain, 1 skin tag was(were) treated with 1-2mm freeze border for 2 cycles with liquid nitrogen. Post cryotherapy instructions were provided.      Follow-up: 1 year for full body skin exam, sooner if concerns    Staff and Scribe:    Scribe Disclosure:   I, Kenia Dang, am serving as a scribe; to document services personally performed by Celeste Fontaine MD -based on data collection and the provider's statements to me.   Provider Disclosure:   The documentation recorded by the scribe accurately reflects the services I personally performed and the decisions made by me.    Celeste Fontaine MD    Department of Dermatology  [facility]: Phone: [phone], Fax: [phone]  10/22/2024       ____________________________________________    CC: Derm Problem (FBSE - Spot on L temple, bumps around neck near where shirt collar rubs)    HPI:  Mr. Lui Arrington is a(n) 54 year old male who presents today as a return patient for skin check.     The patient reports that he has a spot on his temple that is often scaly  feeling, he notes that  this spot often opens up. He denies using a moisturizer. He notes that at the site if his previous BCC he had a hard time keeping the wound closed.     Patient is otherwise feeling well, without additional skin concerns.    Labs Reviewed:  N/A    Physical exam:  Vitals: There were no vitals taken for this visit.  GEN: This is a well developed, well-nourished male in no acute distress, in a pleasant mood.    SKIN: Total skin excluding the undergarment areas was performed. The exam included the head/face, neck, both arms, chest, back, abdomen, both legs, digits and/or nails.   - Slight lichenification on L 5th MCP  - Central upper back, well healed bx scar without recurrent pigment   - Left scapha, 5 mm even light brown macule  - There are dome shaped bright red papules on the trunk and extremities .   - Multiple regular brown pigmented macules and papules are identified on the trunk and extremities. .   - Scattered brown macules on sun exposed areas.  - Waxy stuck on papules and plaques on trunk and extremities.    - L upper chest, slightly irregular brown macule  - There is(are) skin colored pedunculated papules on the lateral neck.   - No other lesions of concern on areas examined.       Medications:  Current Outpatient Medications   Medication Sig Dispense Refill    aspirin 81 MG EC tablet Take 1 tablet (81 mg) by mouth daily 90 tablet 3    atorvastatin (LIPITOR) 40 MG tablet TAKE 1 TABLET (40 MG) BY MOUTH EVERY EVENING. *TIME FOR A FOLLOW UP VISIT WITH LABS* 90 tablet 0    fluticasone (FLONASE) 50 MCG/ACT nasal spray SPRAY 1 SPRAY INTO BOTH NOSTRILS DAILY AS NEEDED FOR RHINITIS OR ALLERGIES 48 mL 1    losartan (COZAAR) 100 MG tablet Take 1 tablet (100 mg) by mouth daily 90 tablet 3    sildenafil (REVATIO) 20 MG tablet TAKE ONE TABLET BY MOUTH AS NEEDED 30 tablet 0    buPROPion (WELLBUTRIN XL) 150 MG 24 hr tablet Take 1 tablet (150 mg) by mouth every morning 90 tablet 3     diphenoxylate-atropine (LOMOTIL) 2.5-0.025 MG tablet Take 1 tablet by mouth 4 times daily as needed for diarrhea 40 tablet 0     Current Facility-Administered Medications   Medication Dose Route Frequency Provider Last Rate Last Admin    erythromycin (ROMYCIN) ophthalmic ointment   Right Eye Once Karthik Gale MD        lidocaine-EPINEPHrine 1 %-1:[id number] injection 1 mL  1 mL Other Once Karthik Gale MD          Past Medical History:   Patient Active Problem List   Diagnosis    Benign essential hypertension    Coronary artery disease involving native coronary artery of native heart without angina pectoris    Cerebral aneurysm, nonruptured    Numbness of fingers of both hands    Hypertrophy of both inferior nasal turbinates    Nasal obstruction    BMI 37.0-37.9, adult    Bradycardia    Status post coronary angiogram    Class 2 severe obesity with serious comorbidity and body mass index (BMI) of 36.0 to 36.9 in adult (H)     Past Medical History:   Diagnosis Date    Brain aneurysm     Small    Coronary artery disease     Diverticulosis     Seasonal allergies     Substance dependence, in remission (H) 1993        Celeste Fontaine MD   DERMATOLOGY  [address] [place], [place] [id number], [place] [address] on close of this encounter.

## 2024-10-16 ENCOUNTER — OFFICE VISIT (OUTPATIENT)
Dept: DERMATOLOGY | Facility: CLINIC | Age: 54
End: 2024-10-16
Attending: DERMATOLOGY
Payer: COMMERCIAL

## 2024-10-16 DIAGNOSIS — L81.4 SOLAR LENTIGO: ICD-10-CM

## 2024-10-16 DIAGNOSIS — Z85.828 HISTORY OF NONMELANOMA SKIN CANCER: ICD-10-CM

## 2024-10-16 DIAGNOSIS — L91.8 INFLAMED SKIN TAG: ICD-10-CM

## 2024-10-16 DIAGNOSIS — D49.2 NEOPLASM OF UNSPECIFIED BEHAVIOR OF BONE, SOFT TISSUE, AND SKIN: ICD-10-CM

## 2024-10-16 DIAGNOSIS — L91.8 SKIN TAG: ICD-10-CM

## 2024-10-16 DIAGNOSIS — L82.1 SEBORRHEIC KERATOSES: ICD-10-CM

## 2024-10-16 DIAGNOSIS — L30.9 HAND DERMATITIS: ICD-10-CM

## 2024-10-16 DIAGNOSIS — Z12.83 SKIN CANCER SCREENING: ICD-10-CM

## 2024-10-16 DIAGNOSIS — D18.01 CHERRY ANGIOMA: ICD-10-CM

## 2024-10-16 DIAGNOSIS — Z86.018 HISTORY OF DYSPLASTIC NEVUS: ICD-10-CM

## 2024-10-16 DIAGNOSIS — Z80.8 FAMILY HISTORY OF MALIGNANT MELANOMA: Primary | ICD-10-CM

## 2024-10-16 DIAGNOSIS — D22.9 MULTIPLE BENIGN NEVI: ICD-10-CM

## 2024-10-16 PROCEDURE — 88305 TISSUE EXAM BY PATHOLOGIST: CPT | Mod: TC | Performed by: DERMATOLOGY

## 2024-10-16 PROCEDURE — 11200 RMVL SKIN TAGS UP TO&INC 15: CPT | Mod: XS | Performed by: DERMATOLOGY

## 2024-10-16 PROCEDURE — 99213 OFFICE O/P EST LOW 20 MIN: CPT | Mod: 25 | Performed by: DERMATOLOGY

## 2024-10-16 PROCEDURE — 88305 TISSUE EXAM BY PATHOLOGIST: CPT | Mod: 26 | Performed by: PATHOLOGY

## 2024-10-16 PROCEDURE — 11102 TANGNTL BX SKIN SINGLE LES: CPT | Performed by: DERMATOLOGY

## 2024-10-16 ASSESSMENT — PAIN SCALES - GENERAL: PAINLEVEL: NO PAIN (0)

## 2024-10-16 NOTE — PATIENT INSTRUCTIONS
Checking for Skin Cancer  You can help find cancer early by checking your skin each month. There are 3 main kinds of skin cancer: melanoma, basal cell carcinoma, and squamous cell carcinoma. Doing monthly skin checks is the best way to find new marks, sores, or skin changes. Follow these instructions for checking your skin.   The ABCDEs of checking moles for melanoma   Check your moles or growths for signs of melanoma using ABCDE:   Asymmetry: The sides of the mole or growth don t match.  Border: The edges are ragged, notched, or blurred.  Color: The color within the mole or growth varies. It could be black, brown, tan, white, or shades of red, gray, or blue.  Diameter: The mole or growth is larger than   inch or 6 mm (size of a pencil eraser).  Evolving: The size, shape, texture, or color of the mole or growth is changing.     ABCDE's of moles on light skin.        ABCDE's of moles on dark skin may be harder to identify.     Checking for other types of skin cancer  Basal cell carcinoma or squamous cell carcinoma cause symptoms like:     A spot or mole that looks different from all other marks on your skin  Changes in how an area feels, such as itching, tenderness, or pain  Changes in the skin's surface, such as oozing, bleeding, or scaliness  A sore that doesn't heal  New swelling, redness, or spread of color beyond the border of a mole    Who s at risk?  Anyone of any skin color can get skin cancer. But you're at greater risk if you have:   Fair skin that freckles easily and burns instead of tanning  Light-colored or red hair  Light-colored eyes  Many moles or abnormal moles on your skin  A long history of unprotected exposure to sunlight or tanning beds  A history of many blistering sunburns as a child or teen  A family history of skin cancer  Been exposed to radiation or chemicals  A weakened immune system  Been exposed to arsenic  If you've had skin cancer in the past, you're at high risk of having it again.    How to check your skin  Do your monthly skin checkups in front of a full-length mirror. Use a room with good lighting so it's easier to see. Use a hand mirror to look at hard-to-see places like your buttocks and back. You can also have a trusted friend or family member help you with these checks. Check every part of your body, including your:   Head (ears, face, neck, and scalp)  Torso (front, back, sides, and under breasts)  Arms (tops, undersides, and armpits)  Hands (palms, backs, and fingers, including under the nails)  Lower back, buttocks, and genitals  Legs (front, back, and sides)  Feet (tops, soles, toes, including under the nails, and between toes)  Watch for new spots on your skin or a spot that's changing in color, shape, size.   If you have a lot of moles, take digital photos of them each month. Make sure to take photos both up close and from a distance. These can help you see if any moles change over time.   Know your skin  Most skin changes aren't cancer. But if you see any changes in your skin, call your healthcare provider right away. Only they can tell you if a change is a problem. If you have skin cancer, seeing your provider can be the first step to getting the treatment that could save your life.   Kayla last reviewed this educational content on 10/1/2021    1475-9536 [employer]. All rights reserved. This information is not intended as a substitute for professional medical care. Always follow your healthcare professional's instructions.     Cryotherapy    What is it?  Use of a very cold liquid, such as liquid nitrogen, to freeze and destroy abnormal skin cells that need to be removed    What should I expect?  Tenderness and redness  A small blister that might grow and fill with dark purple blood. There may be crusting.  More than one treatment may be needed if the lesions do not go away.    How do I care for the treated area?  Gently wash the area with your hands when  bathing.  Use a thin layer of Vaseline to help with healing. You may use a Band-Aid.   The area should heal within 7-10 days and may leave behind a pink or lighter color.   Do not use an antibiotic or Neosporin ointment.   You may take acetaminophen (Tylenol) for pain.     Call your doctor if you have:  Severe pain  Signs of infection (warmth, redness, cloudy yellow drainage, and or a bad smell)  Questions or concerns    Who should I call with questions?      [facility]: [phone]      [facility]: [phone]      For urgent needs outside of business hours call the [facility] at [phone] and ask for the dermatology resident on call Wound Care After a Biopsy    What is a skin biopsy?  A skin biopsy allows the doctor to examine a very small piece of tissue under the microscope to determine the diagnosis and the best treatment for the skin condition. A local anesthetic (numbing medicine) is injected with a very small needle into the skin area to be tested. A small piece of skin is taken from the area. Sometimes a suture (stitch) is used.     What are the risks of a skin biopsy?  I will experience scar, bleeding, swelling, pain, crusting and redness. I may experience incomplete removal or recurrence. Risks of this procedure are excessive bleeding, bruising, infection, nerve damage, numbness, thick (hypertrophic or keloidal) scar and non-diagnostic biopsy.    How should I care for my wound for the first 24 hours?  Keep the wound dry and covered for 24 hours  If it bleeds, hold direct pressure on the area for 15 minutes. If bleeding does not stop, call us or go to the emergency room  Avoid strenuous exercise the first 1-2 days or as your doctor instructs you    How should I care for the wound after 24 hours?  After 24 hours, remove the bandage  You may bathe or shower as normal  If you had a scalp biopsy, you can shampoo as usual and can use  shower water to clean the biopsy site daily  Clean the wound once a day with gentle soap and water  Do not scrub, be gentle  Apply white petroleum/Vaseline after cleaning the wound with a cotton swab or a clean finger, and keep the site covered with a Bandaid /bandage. Bandages are not necessary with a scalp biopsy  If you are unable to cover the site with a Bandaid /bandage, re-apply ointment 2-3 times a day to keep the site moist. Moisture will help with healing  Avoid strenuous activity for first 1-2 days  Avoid lakes, rivers, pools, and oceans until the stitches are removed or the site is healed    How do I clean my wound?  Wash hands thoroughly with soap or use hand  before all wound care  Clean the wound with gentle soap and water  Apply white petroleum/Vaseline  to wound after it is clean  Replace the Bandaid /bandage to keep the wound covered for the first few days or as instructed by your doctor  If you had a scalp biopsy, warm shower water to the area on a daily basis should suffice    What should I use to clean my wound?   Cotton-tipped applicators (Qtips )  White petroleum jelly (Vaseline ). Use a clean new container and use Q-tips to apply.  Bandaids  as needed  Gentle soap     How should I care for my wound long term?  Do not get your wound dirty  Keep up with wound care for one week or until the area is healed.  A small scab will form and fall off by itself when the area is completely healed. The area will be red and will become pink in color as it heals. Sun protection is very important for how your scar will turn out. Sunscreen with an SPF 30 or greater is recommended once the area is healed.  You should have some soreness but it should be mild and slowly go away over several days. Talk to your doctor about using tylenol for pain,    When should I call my doctor?  If you have increased:   Pain or swelling  Pus or drainage (clear or slightly yellow drainage is ok)  Temperature over  100F  Spreading redness or warmth around wound    When will I hear about my results?  The biopsy results can take 2 weeks to come back.  Your results will automatically release to [facility] before your provider has even reviewed them.  The clinic will call you with the results, send you a [facility] message, or have you schedule a follow-up clinic or phone time to discuss the results.  Contact our clinics if you do not hear from us in 2 weeks.    Who should I call with questions?  [facility]: [phone]  [facility]: [phone]  For urgent needs outside of business hours call the [facility] at [phone] and ask for the dermatology resident on call

## 2024-10-16 NOTE — LETTER
10/16/2024       RE: Lui Arrington  [address] [place] [address]     Dear Colleague,    Thank you for referring your patient, Lui Arrington, to the [facility] DERMATOLOGY [facility] [place] at [facility]. Please see a copy of my visit note below.    [facility] Dermatology Note  Encounter Date: Oct 16, 2024  Office Visit     Dermatology Problem List:  Last FBSE: 10/16/2024  #NUB, L upper chest, shave bx 10/16/2024    1. BCC, right upper lateral arm, s/p excision 10/3/23  2. Hx dysplastic nevi  - Compound dysplastic nevus with moderate atypia, central upper back, s/p shave bx 2023; monitor for pigment recurrence  - Compound dysplastic nevus with mild atypia, left chest, s/p shave bx 2023  3. Family hx melanoma (father, MGF, both )  4. 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  - Advised daily moisturization with bland emollient.   - notify me if would like topical steroid    # Multiple benign nevi.   - Monitor for ABCDEs of melanoma   - Continue sun protection - recommend SPF 30 or higher with frequent application   - Return sooner if noticing changing or symptomatic lesions     # Benign lesions - SKs, cherry angiomas, lentigenes.  - No treatment required     # History of moderately dysplastic nevi x2. No evidence of recurrent disease.   # Multiple benign nevi.   # Fhx melanoma.  - Nevi of the left mid frontal scalp and left scapha of the ear stable compared to prior photos.  - Monitor for ABCDEs of melanoma   - Continue sun protection - recommend SPF 30 or higher with frequent  application   - Return sooner if noticing changing or symptomatic lesions      # History of NMSC. No evidence of recurrent disease.  - Continue photoprotection - recommend SPF 30 or higher with frequent reapplication  - Continue yearly skin exams  - Advised to monitor for changing, non-healing, bleeding, painful, changing, or otherwise symptomatic lesions      Procedures Performed:   - Shave biopsy: After discussion of benefits and risks including but not limited to bleeding, infection, scar, incomplete removal, recurrence, and non-diagnostic biopsy, written consent and photographs were obtained. The area was cleaned with isopropyl alcohol. < 1mL of 1% lidocaine with epinephrine was injected to obtain adequate anesthesia. A shave biopsy was performed. Hemostasis was achieved with aluminium chloride. Vaseline and a sterile dressing were applied. The patient tolerated the procedure and no complications were noted. The patient was provided with verbal and written post care instructions.     Cryotherapy procedure note: After verbal consent and discussion of risks and benefits including but no limited to dyspigmentation/scar, blister, and pain, 1 skin tag was(were) treated with 1-2mm freeze border for 2 cycles with liquid nitrogen. Post cryotherapy instructions were provided.      Follow-up: 1 year for full body skin exam, sooner if concerns    Staff and Scribe:    Scribe Disclosure:   I, Kenia Dang, am serving as a scribe; to document services personally performed by Celeste Fontaine MD -based on data collection and the provider's statements to me.   Provider Disclosure:   The documentation recorded by the scribe accurately reflects the services I personally performed and the decisions made by me.    Celeste Fontaine MD    Department of Dermatology  [facility]: Phone: [phone], Fax: [phone]  10/22/2024        ____________________________________________    CC: Derm Problem (FBSE - Spot on L temple, bumps around neck near where shirt collar rubs)    HPI:  Mr. Lui Arrington is a(n) 54 year old male who presents today as a return patient for skin check.     The patient reports that he has a spot on his temple that is often scaly feeling, he notes that  this spot often opens up. He denies using a moisturizer. He notes that at the site if his previous BCC he had a hard time keeping the wound closed.     Patient is otherwise feeling well, without additional skin concerns.    Labs Reviewed:  N/A    Physical exam:  Vitals: There were no vitals taken for this visit.  GEN: This is a well developed, well-nourished male in no acute distress, in a pleasant mood.    SKIN: Total skin excluding the undergarment areas was performed. The exam included the head/face, neck, both arms, chest, back, abdomen, both legs, digits and/or nails.   - Slight lichenification on L 5th MCP  - Central upper back, well healed bx scar without recurrent pigment   - Left scapha, 5 mm even light brown macule  - There are dome shaped bright red papules on the trunk and extremities .   - Multiple regular brown pigmented macules and papules are identified on the trunk and extremities. .   - Scattered brown macules on sun exposed areas.  - Waxy stuck on papules and plaques on trunk and extremities.    - L upper chest, slightly irregular brown macule  - There is(are) skin colored pedunculated papules on the lateral neck.   - No other lesions of concern on areas examined.       Medications:  Current Outpatient Medications   Medication Sig Dispense Refill     aspirin 81 MG EC tablet Take 1 tablet (81 mg) by mouth daily 90 tablet 3     atorvastatin (LIPITOR) 40 MG tablet TAKE 1 TABLET (40 MG) BY MOUTH EVERY EVENING. *TIME FOR A FOLLOW UP VISIT WITH LABS* 90 tablet 0     fluticasone (FLONASE) 50 MCG/ACT nasal spray SPRAY 1 SPRAY INTO BOTH NOSTRILS DAILY AS  NEEDED FOR RHINITIS OR ALLERGIES 48 mL 1     losartan (COZAAR) 100 MG tablet Take 1 tablet (100 mg) by mouth daily 90 tablet 3     sildenafil (REVATIO) 20 MG tablet TAKE ONE TABLET BY MOUTH AS NEEDED 30 tablet 0     buPROPion (WELLBUTRIN XL) 150 MG 24 hr tablet Take 1 tablet (150 mg) by mouth every morning 90 tablet 3     diphenoxylate-atropine (LOMOTIL) 2.5-0.025 MG tablet Take 1 tablet by mouth 4 times daily as needed for diarrhea 40 tablet 0     Current Facility-Administered Medications   Medication Dose Route Frequency Provider Last Rate Last Admin     erythromycin (ROMYCIN) ophthalmic ointment   Right Eye Once Karthik Gale MD         lidocaine-EPINEPHrine 1 %-1:241467 injection 1 mL  1 mL Other Once Karthik Gale MD          Past Medical History:   Patient Active Problem List   Diagnosis     Benign essential hypertension     Coronary artery disease involving native coronary artery of native heart without angina pectoris     Cerebral aneurysm, nonruptured     Numbness of fingers of both hands     Hypertrophy of both inferior nasal turbinates     Nasal obstruction     BMI 37.0-37.9, adult     Bradycardia     Status post coronary angiogram     Class 2 severe obesity with serious comorbidity and body mass index (BMI) of 36.0 to 36.9 in adult (H)     Past Medical History:   Diagnosis Date     Brain aneurysm     Small     Coronary artery disease      Diverticulosis      Seasonal allergies      Substance dependence, in remission (H) 1993       CC Celeste Fontaine MD   DERMATOLOGY  [address] [place], [place] [id number], [place] [address] on close of this encounter.      Again, thank you for allowing me to participate in the care of your patient.      Sincerely,    Celeste Fontaine MD

## 2024-10-16 NOTE — NURSING NOTE
Dermatology Rooming Note    Lui Arrington's goals for this visit include:   Chief Complaint   Patient presents with    Derm Problem     FBSE - Spot on L temple, bumps around neck near where shirt collar rubs     Gregoria Gutierrez, EMT

## 2024-10-16 NOTE — NURSING NOTE
Lidocaine-epinephrine 1-1:387324 % injection   0.25mL once for one use, starting 10/16/2024 ending 10/16/2024,  2mL disp, R-0, injection  Injected by Jeanette Montilla RN

## 2024-12-09 DIAGNOSIS — I10 BENIGN ESSENTIAL HYPERTENSION: ICD-10-CM

## 2024-12-09 RX ORDER — LOSARTAN POTASSIUM 100 MG/1
100 TABLET ORAL DAILY
Qty: 90 TABLET | Refills: 0 | Status: SHIPPED | OUTPATIENT
Start: 2024-12-09

## 2024-12-09 NOTE — TELEPHONE ENCOUNTER
"Request for medication refill:losartan (COZAAR) 100 MG tablet     Providers if patient needs an appointment and you are willing to give a one month supply please refill for one month and  send a letter/MyChart using \".SMILLIMITEDREFILL\" .smillimited and route chart to \"P [facility] \" (Giving one month refill in non controlled medications is strongly recommended before denial)    If refill has been denied, meaning absolutely no refills without visit, please complete the smart phrase \".smirxrefuse\" and route it to the \"P SMI MED REFILLS\"  pool to inform the patient and the pharmacy.    Abena Sy, CMA    "

## 2024-12-16 ENCOUNTER — OFFICE VISIT (OUTPATIENT)
Dept: FAMILY MEDICINE | Facility: CLINIC | Age: 54
End: 2024-12-16
Payer: COMMERCIAL

## 2024-12-16 VITALS
RESPIRATION RATE: 14 BRPM | WEIGHT: 299.2 LBS | BODY MASS INDEX: 37.2 KG/M2 | HEART RATE: 57 BPM | OXYGEN SATURATION: 98 % | TEMPERATURE: 97.4 F | DIASTOLIC BLOOD PRESSURE: 81 MMHG | HEIGHT: 75 IN | SYSTOLIC BLOOD PRESSURE: 124 MMHG

## 2024-12-16 DIAGNOSIS — N41.0 ACUTE PROSTATITIS: ICD-10-CM

## 2024-12-16 DIAGNOSIS — E66.812 CLASS 2 SEVERE OBESITY DUE TO EXCESS CALORIES WITH SERIOUS COMORBIDITY AND BODY MASS INDEX (BMI) OF 36.0 TO 36.9 IN ADULT (H): ICD-10-CM

## 2024-12-16 DIAGNOSIS — I10 BENIGN ESSENTIAL HYPERTENSION: ICD-10-CM

## 2024-12-16 DIAGNOSIS — Z12.5 SCREENING FOR PROSTATE CANCER: ICD-10-CM

## 2024-12-16 DIAGNOSIS — E66.01 CLASS 2 SEVERE OBESITY DUE TO EXCESS CALORIES WITH SERIOUS COMORBIDITY AND BODY MASS INDEX (BMI) OF 36.0 TO 36.9 IN ADULT (H): ICD-10-CM

## 2024-12-16 DIAGNOSIS — Z23 NEED FOR PROPHYLACTIC VACCINATION AND INOCULATION AGAINST INFLUENZA: ICD-10-CM

## 2024-12-16 DIAGNOSIS — I25.10 CORONARY ARTERY DISEASE INVOLVING NATIVE CORONARY ARTERY OF NATIVE HEART WITHOUT ANGINA PECTORIS: ICD-10-CM

## 2024-12-16 DIAGNOSIS — E78.49 OTHER HYPERLIPIDEMIA: ICD-10-CM

## 2024-12-16 DIAGNOSIS — N40.1 BENIGN PROSTATIC HYPERPLASIA WITH URINARY FREQUENCY: ICD-10-CM

## 2024-12-16 DIAGNOSIS — R35.0 BENIGN PROSTATIC HYPERPLASIA WITH URINARY FREQUENCY: ICD-10-CM

## 2024-12-16 DIAGNOSIS — R30.0 DYSURIA: ICD-10-CM

## 2024-12-16 DIAGNOSIS — N52.9 VASCULOGENIC ERECTILE DYSFUNCTION, UNSPECIFIED VASCULOGENIC ERECTILE DYSFUNCTION TYPE: ICD-10-CM

## 2024-12-16 DIAGNOSIS — Z12.11 SCREEN FOR COLON CANCER: Primary | ICD-10-CM

## 2024-12-16 LAB
ALBUMIN UR-MCNC: 30 MG/DL
ANION GAP SERPL CALCULATED.3IONS-SCNC: 7 MMOL/L (ref 7–15)
APPEARANCE UR: CLEAR
BACTERIA #/AREA URNS HPF: NORMAL /HPF
BILIRUB UR QL STRIP: ABNORMAL
BUN SERPL-MCNC: 14.8 MG/DL (ref 6–20)
CALCIUM SERPL-MCNC: 9 MG/DL (ref 8.8–10.4)
CHLORIDE SERPL-SCNC: 104 MMOL/L (ref 98–107)
CHOLEST SERPL-MCNC: 97 MG/DL
COLOR UR AUTO: ABNORMAL
CREAT SERPL-MCNC: 1.07 MG/DL (ref 0.67–1.17)
EGFRCR SERPLBLD CKD-EPI 2021: 81 ML/MIN/1.73M2
FASTING STATUS PATIENT QL REPORTED: YES
FASTING STATUS PATIENT QL REPORTED: YES
GLUCOSE SERPL-MCNC: 116 MG/DL (ref 70–99)
GLUCOSE UR STRIP-MCNC: NEGATIVE MG/DL
HCO3 SERPL-SCNC: 30 MMOL/L (ref 22–29)
HDLC SERPL-MCNC: 38 MG/DL
HGB UR QL STRIP: NEGATIVE
KETONES UR STRIP-MCNC: NEGATIVE MG/DL
LDLC SERPL CALC-MCNC: 39 MG/DL
LEUKOCYTE ESTERASE UR QL STRIP: NEGATIVE
NITRATE UR QL: NEGATIVE
NONHDLC SERPL-MCNC: 59 MG/DL
PH UR STRIP: 5.5 [PH] (ref 5–8)
POTASSIUM SERPL-SCNC: 4.9 MMOL/L (ref 3.4–5.3)
RBC #/AREA URNS AUTO: NORMAL /HPF
SODIUM SERPL-SCNC: 141 MMOL/L (ref 135–145)
SP GR UR STRIP: 1.02 (ref 1–1.03)
SQUAMOUS #/AREA URNS AUTO: NORMAL /LPF
TRIGL SERPL-MCNC: 102 MG/DL
UROBILINOGEN UR STRIP-ACNC: 0.2 E.U./DL
WBC #/AREA URNS AUTO: NORMAL /HPF

## 2024-12-16 RX ORDER — ATORVASTATIN CALCIUM 40 MG/1
40 TABLET, FILM COATED ORAL DAILY
Qty: 90 TABLET | Refills: 3 | Status: SHIPPED | OUTPATIENT
Start: 2024-12-16

## 2024-12-16 RX ORDER — SILDENAFIL CITRATE 20 MG/1
20 TABLET ORAL DAILY
Qty: 30 TABLET | Refills: 0 | Status: SHIPPED | OUTPATIENT
Start: 2024-12-16

## 2024-12-16 RX ORDER — TAMSULOSIN HYDROCHLORIDE 0.4 MG/1
0.4 CAPSULE ORAL DAILY
Qty: 90 CAPSULE | Refills: 1 | Status: SHIPPED | OUTPATIENT
Start: 2024-12-16

## 2024-12-16 RX ORDER — SULFAMETHOXAZOLE AND TRIMETHOPRIM 800; 160 MG/1; MG/1
1 TABLET ORAL 2 TIMES DAILY
Qty: 56 TABLET | Refills: 0 | Status: SHIPPED | OUTPATIENT
Start: 2024-12-16 | End: 2025-01-13

## 2024-12-16 RX ORDER — LOSARTAN POTASSIUM 100 MG/1
100 TABLET ORAL DAILY
Qty: 90 TABLET | Refills: 3 | Status: SHIPPED | OUTPATIENT
Start: 2024-12-16

## 2024-12-16 NOTE — PROGRESS NOTES
Assessment & Plan     Acute prostatitis  Dysuria  Benign prostatic hyperplasia with urinary frequency  Dysuria, frequency and tender prostate c/w   - Urine Culture Aerobic Bacterial; Future  - sulfamethoxazole-trimethoprim (BACTRIM DS) 800-160 MG tablet; Take 1 tablet by mouth 2 times daily for 28 days.  - Urine Culture Aerobic Bacterial  - UA Macroscopic with reflex to Microscopic and Culture; Future  - UA Macroscopic with reflex to Microscopic and Culture  - UA Microscopic with Reflex to Culture  - tamsulosin (FLOMAX) 0.4 MG capsule; Take 1 capsule (0.4 mg) by mouth daily.    Screening for prostate cancer  Discussed risks and benefits of screening for prostate cancer patient agreed with plan to check though we will wait for at least 4 weeks because of a current prostatitis.  - Prostate spec antigen screen; Future    Benign essential hypertension  BP is well-controlled.  Continue Lorcet losartan will check BMP today.  - BASIC METABOLIC PANEL; Future  - losartan (COZAAR) 100 MG tablet; Take 1 tablet (100 mg) by mouth daily. -please follow up for a visit and labs  - BASIC METABOLIC PANEL    Other hyperlipidemia  He is on medium to high dose atorvastatin tolerating it well no concerns will recheck.  - atorvastatin (LIPITOR) 40 MG tablet; Take 1 tablet (40 mg) by mouth daily. Time for follow up    Vasculogenic erectile dysfunction, unspecified vasculogenic erectile dysfunction type  Refilled sildenafil working acceptably advised to use caution with tamsulosin and sildenafil.  - sildenafil (REVATIO) 20 MG tablet; Take 1 tablet (20 mg) by mouth daily.    Class 2 severe obesity due to excess calories with serious comorbidity and body mass index (BMI) of 36.0 to 36.9 in adult (H)  Discussed options for management of obesity patient is not interested in medications at this point and is working on monitoring calories and increasing exercise.    Screen for colon cancer  Patient is a has not had a recent screen for colon  "cancer is due and this was also ordered.  - Colonoscopy Screening  Referral; Future    Coronary artery disease involving native coronary artery of native heart without angina pectoris    - Lipid panel reflex to direct LDL Non-fasting; Future  - Lipid panel reflex to direct LDL Non-fasting    Need for prophylactic vaccination and inoculation against influenza    The longitudinal plan of care for the diagnosis(es)/condition(s) as documented were addressed during this visit. Due to the added complexity in care, I will continue to support Adam in the subsequent management and with ongoing continuity of care.  I spent a total of 48 minutes on the day of the visit.   Time spent by me today doing chart review, history and exam, documentation and further activities per the note        BMI  Estimated body mass index is 37.4 kg/m  as calculated from the following:    Height as of this encounter: 1.905 m (6' 3\").    Weight as of this encounter: 135.7 kg (299 lb 3.2 oz).   Weight management plan: Discussed healthy diet and exercise guidelines        Return in about 4 weeks (around 1/13/2025) for Recheck of urinary frequency..    Subjective   Adam is a 54 year old, presenting for the following health issues:  General Visit, Urinary Problem (Dysuria and weak urine output ), Musculoskeletal Problem (Achilles tendon ), and Imm/Inj (Flu Shot)        12/16/2024     8:10 AM   Additional Questions   Roomed by John         12/16/2024    Information    services provided? No        HPI   Weight Concerns  Went to weight loss clinic is not interested in medications at this point. Continuing to gain weight.   Hypertension  Controlled at home and on second measure  Urinary Burning  Difficulty emptying, recently (one month) burning at beginning. Frequency, nocturia urine looks concentrated. Has no new sexual partners and declines testing                Objective    /81   Pulse 57   Temp 97.4  F (36.3  C) " "(Temporal)   Resp 14   Ht 1.905 m (6' 3\")   Wt 135.7 kg (299 lb 3.2 oz)   SpO2 98%   BMI 37.40 kg/m    Body mass index is 37.4 kg/m .  Physical Exam  Vitals reviewed.   Constitutional:       General: He is not in acute distress.     Appearance: He is well-developed. He is not diaphoretic.   HENT:      Head: Normocephalic.   Eyes:      General: No scleral icterus.     Conjunctiva/sclera: Conjunctivae normal.   Neck:      Thyroid: No thyromegaly.   Cardiovascular:      Rate and Rhythm: Normal rate and regular rhythm.      Heart sounds: Normal heart sounds. No murmur heard.  Pulmonary:      Effort: Pulmonary effort is normal. No respiratory distress.      Breath sounds: Normal breath sounds. No wheezing.   Genitourinary:     Prostate: Tender (significantly). Not enlarged.   Musculoskeletal:      Cervical back: Normal range of motion.      Left lower leg: No edema.   Skin:     General: Skin is warm and dry.   Neurological:      Mental Status: He is alert and oriented to person, place, and time. Mental status is at baseline.   Psychiatric:         Behavior: Behavior normal.         Thought Content: Thought content normal.         Judgment: Judgment normal.                    Signed Electronically by: Richy Dorsey MD    "

## 2024-12-16 NOTE — PATIENT INSTRUCTIONS
Patient Education   Here is the plan from today's visit    1. Acute prostatitis  I hope the medication will help you feel better and kind of calm down your bladder a little bit.  - Urine Culture Aerobic Bacterial; Future  - sulfamethoxazole-trimethoprim (BACTRIM DS) 800-160 MG tablet; Take 1 tablet by mouth 2 times daily for 28 days.  Dispense: 56 tablet; Refill: 0  - Urine Culture Aerobic Bacterial    2. Dysuria  Will communicate with you about infection and how well the antibiotic covers it.  - UA Macroscopic with reflex to Microscopic and Culture; Future  - UA Macroscopic with reflex to Microscopic and Culture  - UA Microscopic with Reflex to Culture    3. Benign prostatic hyperplasia with urinary frequency  I hope this will help you empty your bladder do recommend following up in 4 to 6 weeks to recheck and do an ultrasound to find out how much you are emptying.  - tamsulosin (FLOMAX) 0.4 MG capsule; Take 1 capsule (0.4 mg) by mouth daily.  Dispense: 90 capsule; Refill: 1    4. Screening for prostate cancer  I will communicate this result with you  - Prostate spec antigen screen; Future    5. Benign essential hypertension  Controlled and refilled  - BASIC METABOLIC PANEL; Future  - losartan (COZAAR) 100 MG tablet; Take 1 tablet (100 mg) by mouth daily. -please follow up for a visit and labs  Dispense: 90 tablet; Refill: 3  - BASIC METABOLIC PANEL    6. Other hyperlipidemia    - atorvastatin (LIPITOR) 40 MG tablet; Take 1 tablet (40 mg) by mouth daily. Time for follow up  Dispense: 90 tablet; Refill: 3    7. Vasculogenic erectile dysfunction, unspecified vasculogenic erectile dysfunction type  Refilled  - sildenafil (REVATIO) 20 MG tablet; Take 1 tablet (20 mg) by mouth daily.  Dispense: 30 tablet; Refill: 0    8. Class 2 severe obesity due to excess calories with serious comorbidity and body mass index (BMI) of 36.0 to 36.9 in adult (H)  Let me know if you need further assistance or plans regarding this.    9.  Screen for colon cancer (Primary)  They should be contacting you about this.  - Colonoscopy Screening  Referral; Future    10. Coronary artery disease involving native coronary artery of native heart without angina pectoris    - Lipid panel reflex to direct LDL Non-fasting; Future  - Lipid panel reflex to direct LDL Non-fasting    11. Need for prophylactic vaccination and inoculation against influenza            Please call or return to clinic if your symptoms don't go away.    Follow up plan  Return in about 4 weeks (around 1/13/2025) for Recheck of urinary frequency..    Thank you for coming to [facility] today.  Lab Testing:  **If you had lab testing today and your results are reassuring or normal they will be mailed to you or sent through [employer] Alert within 7 days.   **If the lab tests need quick action we will call you with the results.  **If you are having labs done on a different day, please call [phone] to schedule at [facility] or [phone] for other [facility] Outpatient Lab locations. Labs do not offer walk-in appointments.  The phone number we will call with results is # [phone] (home) [phone] (work). If this is not the best number please call our clinic and change the number.  Medication Refills:  If you need any refills please call your pharmacy and they will contact us.   If you need to  your refill at a new pharmacy, please contact the new pharmacy directly. The new pharmacy will help you get your medications transferred faster.   Scheduling:  If you have any concerns about today's visit or wish to schedule another appointment please call our office during normal business hours [phone] (8-5:00 M-F). If you can no longer make a scheduled visit, please cancel via [employer] Alert or call us to cancel.   If a referral was made to an [facility] specialty provider and you do not get a call from central scheduling, please refer to directions on your visit  summary or call our office during normal business hours for assistance.   If a Mammogram was ordered for you at the Breast Center call [phone] to schedule or change your appointment.  If you had an XRay/CT/Ultrasound/MRI ordered the number is [phone] to schedule or change your radiology appointment.   [facility] has limited ultrasound appointments available on Wednesdays, if you would like your ultrasound at [facility], please call [phone] to schedule.   Medical Concerns:  If you have urgent medical concerns please call [phone] at any time of the day.    Richy Dorsey MD

## 2024-12-17 LAB — BACTERIA UR CULT: NO GROWTH

## 2025-01-02 NOTE — PATIENT INSTRUCTIONS
Annual PCP Report    Results:    FRANK       Patient Education   Here is the plan from today's visit    I THINK THIS IS OTITIS EXTERNA   START EAR DROPS FOUR TIMES DAILY   IF THINGS ARE NOT GETTING BETTER OR ANY NEW SYMPTOMS, RETURN OR VISIT URGENT CARE OR ED     Please call or return to clinic if your symptoms don't go away.    Follow up plan  Return in about 1 week (around 8/4/2023).    Thank you for coming to [facility] today.  Lab Testing:  **If you had lab testing today and your results are reassuring or normal they will be mailed to you or sent through [employer] within 7 days.   **If the lab tests need quick action we will call you with the results.  **If you are having labs done on a different day, please call [phone] to schedule at [facility] or [phone] for other [facility] Outpatient Lab locations. Labs do not offer walk-in appointments.  The phone number we will call with results is # [phone] (home) [phone] (work). If this is not the best number please call our clinic and change the number.  Medication Refills:  If you need any refills please call your pharmacy and they will contact us.   If you need to  your refill at a new pharmacy, please contact the new pharmacy directly. The new pharmacy will help you get your medications transferred faster.   Scheduling:  If you have any concerns about today's visit or wish to schedule another appointment please call our office during normal business hours [phone] (8-5:00 M-F). If you can no longer make a scheduled visit, please cancel via [employer] or call us to cancel.   If a referral was made to an [facility] specialty provider and you do not get a call from central scheduling, please refer to directions on your visit summary or call our office during normal business hours for assistance.   If a Mammogram was ordered for you at the Breast Center call [phone] to schedule or change your appointment.  If you had an XRay/CT/Ultrasound/MRI ordered the number is  [phone] to schedule or change your radiology appointment.   [facility] has limited ultrasound appointments available on Wednesdays, if you would like your ultrasound at [facility], please call [phone] to schedule.   Medical Concerns:  If you have urgent medical concerns please call [phone] at any time of the day.    Lorraine Tinajero MD

## 2025-02-26 ENCOUNTER — TELEPHONE (OUTPATIENT)
Dept: DERMATOLOGY | Facility: CLINIC | Age: 55
End: 2025-02-26
Payer: COMMERCIAL

## 2025-02-26 NOTE — TELEPHONE ENCOUNTER
2/26 Left Voicemail (1st Attempt) and Sent Mychart (1st Attempt) for the patient to call back and schedule the following:    Appointment type: Return Dermatology  Provider: Minal  Return date: 10/15/25  Specialty phone number: [phone]  Additional appointment(s) needed: na  Additonal Notes: na

## 2025-06-02 ENCOUNTER — TELEPHONE (OUTPATIENT)
Dept: GASTROENTEROLOGY | Facility: CLINIC | Age: 55
End: 2025-06-02
Payer: COMMERCIAL

## 2025-06-02 ENCOUNTER — HOSPITAL ENCOUNTER (OUTPATIENT)
Facility: AMBULATORY SURGERY CENTER | Age: 55
End: 2025-06-02
Attending: SURGERY
Payer: COMMERCIAL

## 2025-06-02 NOTE — TELEPHONE ENCOUNTER
Bowel Prep Review:  Disclaimer: No call was made to the patient.     Standard Miralax bowel prep.    Recommended due to standard bowel prep.   Instructions were sent via [employer]      Wen Velásquez RN  Endoscopy Procedure Pre Assessment

## 2025-06-02 NOTE — TELEPHONE ENCOUNTER
"Endoscopy Scheduling Screen    Caller: patient    Have you had any respiratory illness or flu-like symptoms in the last 10 days?  No    What is your communication preference for Instructions and/or Bowel Prep?   MyChart    What insurance is in the chart?  Other:  [employer]    Ordering/Referring Provider: JOSE GARCIA   (If ordering provider performs procedure, schedule with ordering provider unless otherwise instructed. )    BMI: Estimated body mass index is 37.4 kg/m  as calculated from the following:    Height as of 12/16/24: 1.905 m (6' 3\").    Weight as of 12/16/24: 135.7 kg (299 lb 3.2 oz).     Sedation Ordered   (MAC)  moderate sedation.   If patient BMI > 50 do not schedule in [facility].    If patient BMI > 45 do not schedule at [facility].    Are you taking methadone or Suboxone?  NO, No RN review required.    Have you been diagnosed and are being treated for severe PTSD or severe anxiety?  NO, No RN review required.    Are you taking any prescription medications for pain 3 or more times per week?   NO, No RN review required.      Do you have a history of malignant hyperthermia?  No      (Females) Are you currently pregnant?       Have you been diagnosed or told you have pulmonary hypertension?   No      Do you have an LVAD?  No    Have you been told you have moderate to severe sleep apnea?  No.      Have you been told you have COPD, asthma, or any other lung disease?  No      Has your doctor ordered any cardiac tests like echo, angiogram, stress test, ablation, or EKG, that you have not completed yet?  No      Do you  have a history of any heart conditions?  Yes     Have you had any hospitalizations  in the last year for heart related issues, for example a stent placement, heart attack, or cardiomyopathy?  No    Do you have any implantable devices in your body (pacemaker, ICD)?  No    Do you take nitroglycerine?  No      Have you ever had or are you waiting for an organ transplant?  No. Continue scheduling, no site " "restrictions.      Have you had a stroke or transient ischemic attack (TIA aka \"mini stroke\") in the last 2 years?   No.      Have you been diagnosed with or been told you have cirrhosis of the liver?   No.      Are you currently on dialysis?   No      Do you need assistance transferring?   No    BMI: Estimated body mass index is 37.4 kg/m  as calculated from the following:    Height as of 12/16/24: 1.905 m (6' 3\").    Weight as of 12/16/24: 135.7 kg (299 lb 3.2 oz).     Is patients BMI > 40 and scheduling location [facility]?  No    Do you take an injectable or oral medication for weight loss or diabetes (excluding insulin)?  No    Do you take the medication Naltrexone?  No    Do you take blood thinners?  No       Prep   Are you currently on dialysis or do you have chronic kidney disease?  No    Do you have a diagnosis of diabetes?  No    Do you have a diagnosis of cystic fibrosis (CF)?  No    On a regular basis do you go 3 -5 days between bowel movements?  No    BMI > 40?  No    Preferred Pharmacy:    [id number] - [place], [place] - [address]  [address] [place] [address]  Phone: [phone] Fax: [phone]      Final Scheduling Details     Procedure scheduled  Colonoscopy    Surgeon:  TAMEKA     Date of procedure:  6/11/25     Pre-OP / PAC:   No - Not required for this site.    Location  [facility] [place] - Patient preference. (MAC AVAILABILITY)    Sedation   MAC/Deep Sedation - Per order.      Patient Reminders:   You will receive a call from a Nurse to review instructions and health history.  This assessment must be completed prior to your procedure.  Failure to complete the Nurse assessment may result in the procedure being cancelled.      On the day of your procedure, please designate an adult(s) who can drive you home stay with you for the next 24 hours. The medicines used in the exam will make you sleepy. You will not be able to drive.      You cannot take public transportation, ride share services, " or non-medical taxi service without a responsible caregiver.  Medical transport services are allowed with the requirement that a responsible caregiver will receive you at your destination.  We require that drivers and caregivers are confirmed prior to your procedure.

## 2025-06-10 ENCOUNTER — TELEPHONE (OUTPATIENT)
Dept: GASTROENTEROLOGY | Facility: CLINIC | Age: 55
End: 2025-06-10
Payer: COMMERCIAL

## 2025-06-10 NOTE — TELEPHONE ENCOUNTER
Caller: Writer to Pt    Reason for Reschedule/Cancellation (please be detailed, any staff messages or encounters to note?):   HVAC system down at [facility]    Did you cancel or rescheduled an [facility] procedure? No.    Is screening questionnaire older than 3 months from the reschedule date.   If Yes, please complete screening questionnaire. No    Prior to reschedule please review:  Ordering Provider: DEEPA GARCIA   Sedation Determined: MAC  Does patient have any ASC Exclusions, please identify?: no    Notes on Cancelled Procedure:  Procedure: Lower Endoscopy [Colonoscopy]   Date: 6/11/25  Location: [facility]; [address], [address], [place] [address]  Surgeon: TAMEKA    Rescheduled: Yes,   Procedure: Lower Endoscopy [Colonoscopy]    Date: 6/11/25   Location: [facility]; [address], [address], [place] [address]    Surgeon: APARNA   Sedation Level Scheduled  MAC ,  Reason for Sedation Level Per order   Instructions updated and sent: armando soria     Does patient need PAC or Pre -Op Rescheduled? : no

## 2025-06-11 ENCOUNTER — HOSPITAL ENCOUNTER (OUTPATIENT)
Facility: CLINIC | Age: 55
Discharge: HOME | End: 2025-06-11
Attending: STUDENT IN AN ORGANIZED HEALTH CARE EDUCATION/TRAINING PROGRAM | Admitting: STUDENT IN AN ORGANIZED HEALTH CARE EDUCATION/TRAINING PROGRAM
Payer: COMMERCIAL

## 2025-06-11 ENCOUNTER — ANESTHESIA EVENT (OUTPATIENT)
Dept: GASTROENTEROLOGY | Facility: CLINIC | Age: 55
End: 2025-06-11
Payer: COMMERCIAL

## 2025-06-11 ENCOUNTER — ANESTHESIA (OUTPATIENT)
Dept: GASTROENTEROLOGY | Facility: CLINIC | Age: 55
End: 2025-06-11
Payer: COMMERCIAL

## 2025-06-11 VITALS — DIASTOLIC BLOOD PRESSURE: 69 MMHG | TEMPERATURE: 97.9 F | SYSTOLIC BLOOD PRESSURE: 115 MMHG | OXYGEN SATURATION: 95 %

## 2025-06-11 LAB — COLONOSCOPY: NORMAL

## 2025-06-11 PROCEDURE — 250N000009 HC RX 250: Performed by: NURSE ANESTHETIST, CERTIFIED REGISTERED

## 2025-06-11 PROCEDURE — 258N000003 HC RX IP 258 OP 636: Performed by: NURSE ANESTHETIST, CERTIFIED REGISTERED

## 2025-06-11 PROCEDURE — 370N000017 HC ANESTHESIA TECHNICAL FEE, PER MIN: Performed by: STUDENT IN AN ORGANIZED HEALTH CARE EDUCATION/TRAINING PROGRAM

## 2025-06-11 PROCEDURE — 88305 TISSUE EXAM BY PATHOLOGIST: CPT | Mod: 26 | Performed by: PATHOLOGY

## 2025-06-11 PROCEDURE — 45380 COLONOSCOPY AND BIOPSY: CPT | Mod: PT | Performed by: STUDENT IN AN ORGANIZED HEALTH CARE EDUCATION/TRAINING PROGRAM

## 2025-06-11 PROCEDURE — 88305 TISSUE EXAM BY PATHOLOGIST: CPT | Mod: TC | Performed by: STUDENT IN AN ORGANIZED HEALTH CARE EDUCATION/TRAINING PROGRAM

## 2025-06-11 PROCEDURE — 250N000011 HC RX IP 250 OP 636: Performed by: NURSE ANESTHETIST, CERTIFIED REGISTERED

## 2025-06-11 RX ORDER — ONDANSETRON 4 MG/1
4 TABLET, ORALLY DISINTEGRATING ORAL EVERY 30 MIN PRN
Status: CANCELLED | OUTPATIENT
Start: 2025-06-11

## 2025-06-11 RX ORDER — SODIUM CHLORIDE, SODIUM LACTATE, POTASSIUM CHLORIDE, CALCIUM CHLORIDE 600; 310; 30; 20 MG/100ML; MG/100ML; MG/100ML; MG/100ML
INJECTION, SOLUTION INTRAVENOUS CONTINUOUS
Status: DISCONTINUED | OUTPATIENT
Start: 2025-06-11 | End: 2025-06-11 | Stop reason: HOSPADM

## 2025-06-11 RX ORDER — SODIUM CHLORIDE, SODIUM LACTATE, POTASSIUM CHLORIDE, CALCIUM CHLORIDE 600; 310; 30; 20 MG/100ML; MG/100ML; MG/100ML; MG/100ML
INJECTION, SOLUTION INTRAVENOUS CONTINUOUS PRN
Status: DISCONTINUED | OUTPATIENT
Start: 2025-06-11 | End: 2025-06-11

## 2025-06-11 RX ORDER — PROPOFOL 10 MG/ML
INJECTION, EMULSION INTRAVENOUS CONTINUOUS PRN
Status: DISCONTINUED | OUTPATIENT
Start: 2025-06-11 | End: 2025-06-11

## 2025-06-11 RX ORDER — LIDOCAINE HYDROCHLORIDE 20 MG/ML
INJECTION, SOLUTION INFILTRATION; PERINEURAL PRN
Status: DISCONTINUED | OUTPATIENT
Start: 2025-06-11 | End: 2025-06-11

## 2025-06-11 RX ORDER — OXYCODONE HYDROCHLORIDE 5 MG/1
5 TABLET ORAL
Refills: 0 | Status: CANCELLED | OUTPATIENT
Start: 2025-06-11

## 2025-06-11 RX ORDER — LIDOCAINE 40 MG/G
CREAM TOPICAL
Status: DISCONTINUED | OUTPATIENT
Start: 2025-06-11 | End: 2025-06-11 | Stop reason: HOSPADM

## 2025-06-11 RX ORDER — PROPOFOL 10 MG/ML
INJECTION, EMULSION INTRAVENOUS PRN
Status: DISCONTINUED | OUTPATIENT
Start: 2025-06-11 | End: 2025-06-11

## 2025-06-11 RX ORDER — OXYCODONE HYDROCHLORIDE 10 MG/1
10 TABLET ORAL
Refills: 0 | Status: CANCELLED | OUTPATIENT
Start: 2025-06-11

## 2025-06-11 RX ORDER — DEXAMETHASONE SODIUM PHOSPHATE 4 MG/ML
4 INJECTION, SOLUTION INTRA-ARTICULAR; INTRALESIONAL; INTRAMUSCULAR; INTRAVENOUS; SOFT TISSUE
Status: CANCELLED | OUTPATIENT
Start: 2025-06-11

## 2025-06-11 RX ORDER — ONDANSETRON 2 MG/ML
4 INJECTION INTRAMUSCULAR; INTRAVENOUS EVERY 30 MIN PRN
Status: CANCELLED | OUTPATIENT
Start: 2025-06-11

## 2025-06-11 RX ORDER — NALOXONE HYDROCHLORIDE 0.4 MG/ML
0.1 INJECTION, SOLUTION INTRAMUSCULAR; INTRAVENOUS; SUBCUTANEOUS
Status: CANCELLED | OUTPATIENT
Start: 2025-06-11

## 2025-06-11 RX ADMIN — SODIUM CHLORIDE, SODIUM LACTATE, POTASSIUM CHLORIDE, AND CALCIUM CHLORIDE: .6; .31; .03; .02 INJECTION, SOLUTION INTRAVENOUS at 13:03

## 2025-06-11 RX ADMIN — PROPOFOL 150 MCG/KG/MIN: 10 INJECTION, EMULSION INTRAVENOUS at 13:05

## 2025-06-11 RX ADMIN — PROPOFOL 30 MG: 10 INJECTION, EMULSION INTRAVENOUS at 13:08

## 2025-06-11 RX ADMIN — LIDOCAINE HYDROCHLORIDE 100 MG: 20 INJECTION, SOLUTION INFILTRATION; PERINEURAL at 13:05

## 2025-06-11 RX ADMIN — PROPOFOL 50 MG: 10 INJECTION, EMULSION INTRAVENOUS at 13:05

## 2025-06-11 ASSESSMENT — ACTIVITIES OF DAILY LIVING (ADL)
ADLS_ACUITY_SCORE: 42
ADLS_ACUITY_SCORE: 42

## 2025-06-11 NOTE — ANESTHESIA POSTPROCEDURE EVALUATION
Patient: Lui Arrington    Procedure: Procedure(s):  COLONOSCOPY, WITH POLYPECTOMY AND BIOPSY       Anesthesia Type:  MAC    Note:  Disposition: Outpatient   Postop Pain Control: Uneventful            Sign Out: Well controlled pain   PONV: No   Neuro/Psych: Uneventful            Sign Out: Acceptable/Baseline neuro status   Airway/Respiratory: Uneventful            Sign Out: Acceptable/Baseline resp. status   CV/Hemodynamics: Uneventful            Sign Out: Acceptable CV status; No obvious hypovolemia; No obvious fluid overload   Other NRE: NONE   DID A NON-ROUTINE EVENT OCCUR? No           Last vitals:  Vitals Value Taken Time   /69 06/11/25 14:15   Temp     Pulse     Resp     SpO2 95 % 06/11/25 14:15       Electronically Signed By: Karthik Butler MD  June 11, 2025  2:46 PM

## 2025-06-11 NOTE — ANESTHESIA PREPROCEDURE EVALUATION
Anesthesia Pre-Procedure Evaluation    Patient: Lui Arrington   MRN: [id number] : 1970          Procedure : Procedure(s):  Colonoscopy, Screening         Past Medical History:   Diagnosis Date    Brain aneurysm     Small    Coronary artery disease     Diverticulosis     Seasonal allergies     Substance dependence, in remission (H)       Past Surgical History:   Procedure Laterality Date    COLONOSCOPY  2014    Procedure: COMBINED COLONOSCOPY, SINGLE BIOPSY/POLYPECTOMY BY BIOPSY;  Surgeon: Pb Celaya MD;  Location: [facility]    CV CORONARY ANGIOGRAM N/A 2023    Procedure: Coronary Angiogram;  Surgeon: Fracisco Morales MD;  Location:  [facility]    CV PCI N/A 2023    Procedure: Percutaneous Coronary Intervention;  Surgeon: Fracisco Morales MD;  Location: [facility] CARDIAC CATH LAB    TURBINATE REDUCTION Bilateral 2021    Procedure: REDUCTION, BILATERAL INFERIOR NASAL TURBINATE;  Surgeon: Phoenix Garcia MD;  Location: [facility] OR    VASECTOMY        Allergies   Allergen Reactions    Ciprofloxacin Hives    Erythromycin     Seasonal Allergies     Lisinopril Cough      Social History     Tobacco Use    Smoking status: Former     Current packs/day: 0.00     Types: Cigarettes     Quit date: 1998     Years since quittin.4    Smokeless tobacco: Never   Substance Use Topics    Alcohol use: No      Wt Readings from Last 1 Encounters:   24 135.7 kg (299 lb 3.2 oz)        Anesthesia Evaluation   Pt has had prior anesthetic. Type: MAC and General.        ROS/MED HX  ENT/Pulmonary:  - neg pulmonary ROS     Neurologic:  - neg neurologic ROS     Cardiovascular:     (+) Dyslipidemia hypertension- -  CAD -  - -                                      METS/Exercise Tolerance:     Hematologic:  - neg hematologic  ROS     Musculoskeletal:  - neg musculoskeletal ROS     GI/Hepatic:  - neg GI/hepatic ROS     Renal/Genitourinary:  - neg Renal ROS     Endo:     (+)                Obesity,       Psychiatric/Substance Use:  - neg psychiatric ROS     Infectious Disease:  - neg infectious disease ROS     Malignancy:  - neg malignancy ROS     Other:  - neg other ROS            Physical Exam  Airway  Mallampati: II  TM distance: >3 FB  Mouth opening: >= 4 cm    Cardiovascular   Rhythm: regular  Rate: normal rate     Dental   (+) Minor Abnormalities - some fillings, tiny chips      Pulmonary Breath sounds clear to auscultation        Neurological - normal exam  He appears awake, alert and oriented x3.    Other Findings       OUTSIDE LABS:  CBC:   Lab Results   Component Value Date    WBC 6.5 04/21/2023    WBC 7.5 08/11/2022    HGB 14.3 04/21/2023    HGB 14.1 08/11/2022    HCT 46.6 04/21/2023    HCT 44.8 08/11/2022     04/21/2023     08/11/2022     BMP:   Lab Results   Component Value Date     12/16/2024     04/21/2023    POTASSIUM 4.9 12/16/2024    POTASSIUM 4.8 04/21/2023    CHLORIDE 104 12/16/2024    CHLORIDE 104 04/21/2023    CO2 30 (H) 12/16/2024    CO2 25 04/21/2023    BUN 14.8 12/16/2024    BUN 15.5 04/21/2023    CR 1.07 12/16/2024    CR 0.97 04/21/2023     (H) 12/16/2024     (H) 04/21/2023     COAGS:   Lab Results   Component Value Date    PTT 26 04/21/2023    INR 0.86 04/21/2023     POC:   Lab Results   Component Value Date     (H) 09/03/2018     HEPATIC:   Lab Results   Component Value Date    ALBUMIN 4.0 09/03/2018    PROTTOTAL 8.0 09/03/2018    ALT 29 09/03/2018    AST 20 09/03/2018    ALKPHOS 64 09/03/2018    BILITOTAL 0.6 09/03/2018     OTHER:   Lab Results   Component Value Date    A1C 5.0 08/24/2018    MARIO 9.0 12/16/2024    MAG 2.2 06/01/2014    TSH 1.81 08/11/2022    CRP 17.2 (H) 10/22/2014    SED 15 08/11/2022       Anesthesia Plan    ASA Status:  2      NPO Status: NPO Appropriate   Anesthesia Type: MAC.  Maintenance: TIVA.   Techniques and Equipment:       - Monitoring Plan: standard ASA monitoring     Consents             Postoperative Care    Pain management: non-narcotic analgesics.     Comments:                   Karthik Butler MD    I have reviewed the pertinent notes and labs in the chart from the past 30 days and (re)examined the patient.  Any updates or changes from those notes are reflected in this note.    Clinically Significant Risk Factors Present on Admission                 # Drug Induced Platelet Defect: home medication list includes an antiplatelet medication   # Hypertension: Noted on problem list

## 2025-06-11 NOTE — ANESTHESIA CARE TRANSFER NOTE
Patient: Lui Arrington    Procedure: Procedure(s):  COLONOSCOPY, WITH POLYPECTOMY AND BIOPSY       Diagnosis: Screen for colon cancer [Z12.11]  Diagnosis Additional Information: No value filed.    Anesthesia Type:   MAC     Note:    Oropharynx: oropharynx clear of all foreign objects and spontaneously breathing  Level of Consciousness: awake  Oxygen Supplementation: face mask  Level of Supplemental Oxygen (L/min / FiO2): 8  Independent Airway: airway patency satisfactory and stable  Dentition: dentition unchanged  Vital Signs Stable: post-procedure vital signs reviewed and stable  Report to RN Given: handoff report given  Patient transferred to: Phase II    Handoff Report: Identifed the Patient, Identified the Reponsible Provider, Reviewed the pertinent medical history, Discussed the surgical course, Reviewed Intra-OP anesthesia mangement and issues during anesthesia, Set expectations for post-procedure period and Allowed opportunity for questions and acknowledgement of understanding      Vitals:  Vitals Value Taken Time   /86    Temp 36    Pulse 61    Resp 12    SpO2 97%        Electronically Signed By: KALLIE Larson CRNA  June 11, 2025  1:39 PM

## 2025-06-11 NOTE — H&P
Lui Arrington  [id number]  male  55 year old      Reason for procedure/surgery: screening    Patient Active Problem List   Diagnosis    Benign essential hypertension    Coronary artery disease involving native coronary artery of native heart without angina pectoris    Cerebral aneurysm, nonruptured    Numbness of fingers of both hands    Hypertrophy of both inferior nasal turbinates    Nasal obstruction    BMI 37.0-37.9, adult    Bradycardia    Status post coronary angiogram    Class 2 severe obesity with serious comorbidity and body mass index (BMI) of 36.0 to 36.9 in adult (H)       Past Surgical History:    Past Surgical History:   Procedure Laterality Date    COLONOSCOPY  2014    Procedure: COMBINED COLONOSCOPY, SINGLE BIOPSY/POLYPECTOMY BY BIOPSY;  Surgeon: Pb Celaya MD;  Location:  GI    CV CORONARY ANGIOGRAM N/A 2023    Procedure: Coronary Angiogram;  Surgeon: Fracisco Morales MD;  Location: [facility] CARDIAC CATH LAB    CV PCI N/A 2023    Procedure: Percutaneous Coronary Intervention;  Surgeon: Fracisco Morales MD;  Location: [facility] CARDIAC CATH LAB    TURBINATE REDUCTION Bilateral 2021    Procedure: REDUCTION, BILATERAL INFERIOR NASAL TURBINATE;  Surgeon: Phoenix Garcia MD;  Location: [facility] OR    VASECTOMY         Past Medical History:   Past Medical History:   Diagnosis Date    Brain aneurysm     Small    Coronary artery disease     Diverticulosis     Seasonal allergies     Substance dependence, in remission (H)        Social History:   Social History     Tobacco Use    Smoking status: Former     Current packs/day: 0.00     Types: Cigarettes     Quit date: 1998     Years since quittin.4    Smokeless tobacco: Never   Substance Use Topics    Alcohol use: No       Family History:   Family History   Problem Relation Age of Onset    Cancer Mother     Cancer - colorectal Mother     Hypertension Mother     Crohn's Disease Mother     Cancer Father     Melanoma  Father     Diabetes Maternal Grandmother     C.A.D. Maternal Grandmother     Melanoma Maternal Grandfather     Macular Degeneration Paternal Grandfather     C.A.D. Paternal Grandfather     Hypertension Brother     Kidney Cancer Brother     Glaucoma No family hx of        Allergies:   Allergies   Allergen Reactions    Ciprofloxacin Hives    Erythromycin     Seasonal Allergies     Lisinopril Cough       Active Medications:   No current outpatient medications on file.       Systemic Review:   CONSTITUTIONAL: NEGATIVE for fever, chills, change in weight  ENT/MOUTH: NEGATIVE for ear, mouth and throat problems  RESP: NEGATIVE for significant cough or SOB  CV: NEGATIVE for chest pain, palpitations or peripheral edema    Physical Examination:   Vital Signs: BP (!) 129/96   Temp (P) 99  F (37.2  C) (Oral)   SpO2 98%   GENERAL: healthy, alert and no distress  NECK: no adenopathy, no asymmetry, masses, or scars  RESP: lungs clear to auscultation - no rales, rhonchi or wheezes  CV: regular rate and rhythm, normal S1 S2, no S3 or S4, no murmur, click or rub, no peripheral edema and peripheral pulses strong  ABDOMEN: soft, nontender, no hepatosplenomegaly, no masses and bowel sounds normal  MS: no gross musculoskeletal defects noted, no edema    Plan: Appropriate to proceed as scheduled.      Jean Paul Haider MD  6/11/2025    PCP:  Richy Dorsey

## 2025-06-11 NOTE — OR NURSING
Pt underwent colonoscopy with polypectomy under MAC. Specimens: sent to lab. Pt transferred to recovery and report given to endo RN.       Sherry Uribe RN

## 2025-06-25 PROBLEM — D12.6 ADENOMATOUS POLYP OF COLON: Status: ACTIVE | Noted: 2025-06-25

## 2025-06-28 DIAGNOSIS — N52.9 VASCULOGENIC ERECTILE DYSFUNCTION, UNSPECIFIED VASCULOGENIC ERECTILE DYSFUNCTION TYPE: ICD-10-CM

## 2025-06-30 ENCOUNTER — RESULTS FOLLOW-UP (OUTPATIENT)
Dept: GASTROENTEROLOGY | Facility: CLINIC | Age: 55
End: 2025-06-30

## 2025-06-30 RX ORDER — SILDENAFIL CITRATE 20 MG/1
20 TABLET ORAL DAILY
Qty: 30 TABLET | Refills: 0 | Status: SHIPPED | OUTPATIENT
Start: 2025-06-30

## 2025-06-30 NOTE — TELEPHONE ENCOUNTER
"Request for medication refill:    Medication Name:     sildenafil (REVATIO) 20 MG tablet     Providers if patient needs an appointment and you are willing to give a one month supply please refill for one month and  send a MyChart using \".SMILLIMITEDREFILL\" .Or route chart to \"P SMI \" . And use the phrase \" SMIRXFOLLOWUP\"To call patient and inform of limited refill and providers request to make an appointment. (Giving one month refill in non controlled medications is strongly recommended before denial)    If refill has been denied, meaning absolutely no refills without visit, please complete the smart phrase \".SMIRXREFUSE\" and route it to the \"P [facility] MED REFILLS\"  pool to inform the patient and the pharmacy.    Yin Lazaro MA     "

## 2025-07-07 ENCOUNTER — OFFICE VISIT (OUTPATIENT)
Dept: FAMILY MEDICINE | Facility: CLINIC | Age: 55
End: 2025-07-07
Payer: COMMERCIAL

## 2025-07-07 VITALS
TEMPERATURE: 98.4 F | HEART RATE: 54 BPM | OXYGEN SATURATION: 100 % | DIASTOLIC BLOOD PRESSURE: 89 MMHG | HEIGHT: 75 IN | RESPIRATION RATE: 17 BRPM | SYSTOLIC BLOOD PRESSURE: 146 MMHG | WEIGHT: 302.5 LBS | BODY MASS INDEX: 37.61 KG/M2

## 2025-07-07 DIAGNOSIS — M76.62 ACHILLES TENDONITIS, BILATERAL: Primary | ICD-10-CM

## 2025-07-07 DIAGNOSIS — E66.812 CLASS 2 SEVERE OBESITY DUE TO EXCESS CALORIES WITH SERIOUS COMORBIDITY AND BODY MASS INDEX (BMI) OF 36.0 TO 36.9 IN ADULT (H): ICD-10-CM

## 2025-07-07 DIAGNOSIS — E66.01 CLASS 2 SEVERE OBESITY DUE TO EXCESS CALORIES WITH SERIOUS COMORBIDITY AND BODY MASS INDEX (BMI) OF 36.0 TO 36.9 IN ADULT (H): ICD-10-CM

## 2025-07-07 DIAGNOSIS — I25.10 CORONARY ARTERY DISEASE INVOLVING NATIVE CORONARY ARTERY OF NATIVE HEART WITHOUT ANGINA PECTORIS: ICD-10-CM

## 2025-07-07 DIAGNOSIS — M76.61 ACHILLES TENDONITIS, BILATERAL: Primary | ICD-10-CM

## 2025-07-07 PROCEDURE — 3077F SYST BP >= 140 MM HG: CPT | Performed by: FAMILY MEDICINE

## 2025-07-07 PROCEDURE — 1125F AMNT PAIN NOTED PAIN PRSNT: CPT | Performed by: FAMILY MEDICINE

## 2025-07-07 PROCEDURE — 3079F DIAST BP 80-89 MM HG: CPT | Performed by: FAMILY MEDICINE

## 2025-07-07 PROCEDURE — 99213 OFFICE O/P EST LOW 20 MIN: CPT | Performed by: FAMILY MEDICINE

## 2025-07-07 ASSESSMENT — PAIN SCALES - GENERAL: PAINLEVEL_OUTOF10: SEVERE PAIN (7)

## 2025-07-07 NOTE — PATIENT INSTRUCTIONS
Based on our discussion, I have outlined the following instructions for you:      - Schedule an appointment with a physical therapist to help assess and manage your Achilles tendonitis.  - Think about using heel lifts in your shoes to help reduce pain and swelling.  - I'll talk to our sports medicine specialist for more advice on your condition.  - Use ibuprofen only occasionally and carefully because of your heart condition.

## 2025-07-07 NOTE — PROGRESS NOTES
"  Assessment & Plan     Achilles tendonitis, bilateral:  - Bilateral Achilles tendonitis. No evidence of a full tear, nor trauma. No significant change in exercise or footwear noted. No plantar pain or other joint issues reported. Swelling and pain noted, especially after periods of inactivity and when walking downstairs.  - Recommend physical therapy for further evaluation and management. Consider heel lifts to alleviate symptoms. May want to do US. Will consult with sports medicine specialist for additional input. Patient advised to use ibuprofen sparingly due to heart condition.    Class 2 obesity:  - Weight gain noted, contributing to difficulty in exercise and mobility. And achilles tendenopathy interfering in his exercise.       Coronary artery disease:  - Patient has a history of coronary artery disease. No current symptoms of congestive heart failure. Last echocardiogram was in 2024, with a recommendation for a follow-up in 2025.  - Patient advised to reach out to cardiology for a follow-up echocardiogram in 2025.    Consent was obtained from the patient to use an AI documentation tool in the creation of this note.          BMI  Estimated body mass index is 37.81 kg/m  as calculated from the following:    Height as of this encounter: 1.905 m (6' 3\").    Weight as of this encounter: 137.2 kg (302 lb 8 oz).             Subjective   Adam is a 55 year old, presenting for the following health issues:  Tendonitis (Feb./ march started. Achilles - has tired nsaids, ice, stretching and compression.)      7/7/2025    10:54 AM   Additional Questions   Roomed by Allison   Accompanied by self         7/7/2025    Information    services provided? No     HPI Adam Arrington, 55 years old, male  - Bilateral Achilles pain, with the left side being worse, ongoing for 4 - 5  months  - Pain worsens after being still, especially when walking downstairs; improves slightly once calves are warmed up  - Swelling " "in legs, a new symptom, with legs appearing puffy and feeling hot periodically  - Difficulty exercising and walking due to pain, leading to weight gain  - No history of injury or significant change in exercise regimen; no change in shoes  - No foot pain or plantar fasciitis symptoms; no instability in ankles  - Concerns about using ibuprofen due to potential kidney effects and exacerbation of swelling; father-in-law had kidney failure  - Coronary artery disease diagnosis  - Received the first dose of the hepatitis B vaccine, due for the second dose                  Objective    BP (!) 146/89   Pulse 54   Temp 98.4  F (36.9  C) (Skin)   Resp 17   Ht 1.905 m (6' 3\")   Wt (!) 137.2 kg (302 lb 8 oz)   SpO2 100%   BMI 37.81 kg/m    Body mass index is 37.81 kg/m .  Physical Exam   Bilateral ankles: nl ROM, both with some edema that tracks a few cm into the foot and then mid way up the shin.  No erythema. Left ankle slightly warmer but minimally so.  Non tender to palpation and with ROM but tender over the achilles tendon, left more than right and slightly proximal to the tendon insertion. Nl tendon function.   No bruising  DP pulse palpable              Signed Electronically by: Ally Culver MD    "

## 2025-07-08 ENCOUNTER — MYC MEDICAL ADVICE (OUTPATIENT)
Dept: FAMILY MEDICINE | Facility: CLINIC | Age: 55
End: 2025-07-08
Payer: COMMERCIAL

## 2025-07-08 DIAGNOSIS — M76.61 ACHILLES TENDINITIS OF BOTH LOWER EXTREMITIES: Primary | ICD-10-CM

## 2025-07-08 DIAGNOSIS — M76.62 ACHILLES TENDINITIS OF BOTH LOWER EXTREMITIES: Primary | ICD-10-CM

## 2025-07-20 ASSESSMENT — ACTIVITIES OF DAILY LIVING (ADL)
PUTTING_ON_YOUR_SHOES_OR_SOCKS: A LITTLE BIT OF DIFFICULTY
YOUR_USUAL_HOBBIES,_RECREATIONAL_OR_SPORTING_ACTIVITIES: QUITE A BIT OF DIFFICULTY
PLEASE_INDICATE_YOR_PRIMARY_REASON_FOR_REFERRAL_TO_THERAPY:: FOOT AND/OR ANKLE
GETTING_INTO_AND_OUT_OF_A_BATH: A LITTLE BIT OF DIFFICULTY
ROLLING_OVER_IN_BED: NO DIFFICULTY
PERFORMING_HEAVY_ACTIVITIES_AROUND_YOUR_HOME: MODERATE DIFFICULTY
MAKING_SHARP_TURNS_WHILE_RUNNING_FAST: NO DIFFICULTY
LEFS_RAW_SCORE: 0
RUNNING_ON_UNEVEN_GROUND: QUITE A BIT OF DIFFICULTY
ANY_OF_YOUR_USUAL_WORK,_HOUSEWORK_OR_SCHOOL_ACTIVITIES: MODERATE DIFFICULTY
RUNNING_ON_EVEN_GROUND: QUITE A BIT OF DIFFICULTY
SQUATTING: A LITTLE BIT OF DIFFICULTY
GOING_UP_OR_DOWN_10_STAIRS: MODERATE DIFFICULTY
GETTING_INTO_OR_OUT_OF_A_CAR: A LITTLE BIT OF DIFFICULTY
WALKING_BETWEEN_ROOMS: A LITTLE BIT OF DIFFICULTY
LIFTING_AN_OBJECT,_LIKE_A_BAG_OF_GROCERIES_FROM_THE_FLOOR: A LITTLE BIT OF DIFFICULTY
SHOPPING: A LITTLE BIT OF DIFFICULTY
LEFS_SCORE(%): 0
STANDING_FOR_1_HOUR: NO DIFFICULTY
WALKING_A_MILE: A LITTLE BIT OF DIFFICULTY
SITTING_FOR_1_HOUR: NO DIFFICULTY
PERFORMING_LIGHT_ACTIVITIES_AROUND_YOUR_HOME: A LITTLE BIT OF DIFFICULTY
WALKING_2_BLOCKS: A LITTLE BIT OF DIFFICULTY

## 2025-07-21 ENCOUNTER — THERAPY VISIT (OUTPATIENT)
Dept: PHYSICAL THERAPY | Facility: REHABILITATION | Age: 55
End: 2025-07-21
Attending: FAMILY MEDICINE
Payer: COMMERCIAL

## 2025-07-21 DIAGNOSIS — M76.61 ACHILLES TENDONITIS, BILATERAL: ICD-10-CM

## 2025-07-21 DIAGNOSIS — M76.62 ACHILLES TENDONITIS, BILATERAL: ICD-10-CM

## 2025-07-21 PROCEDURE — 97535 SELF CARE MNGMENT TRAINING: CPT | Mod: GP

## 2025-07-21 PROCEDURE — 97161 PT EVAL LOW COMPLEX 20 MIN: CPT | Mod: GP

## 2025-07-21 PROCEDURE — 97110 THERAPEUTIC EXERCISES: CPT | Mod: GP

## 2025-07-21 NOTE — PROGRESS NOTES
PHYSICAL THERAPY EVALUATION  Type of Visit: Evaluation       Fall Risk Screen:  Have you fallen 2 or more times in the past year?: No  Have you fallen and had an injury in the past year?: No    Subjective         Presenting condition or subjective complaint: Pain in backs of ankles, lesftvworse than right  Date of onset: 07/07/25    Relevant medical history: High blood pressure; Numbness or tingling in perianal area; Overweight   Dates & types of surgery: Nasal turbinid reduction.  Skin cancer resection.  Both more than 2 years ago.    Prior diagnostic imaging/testing results:       Prior therapy history for the same diagnosis, illness or injury: No      Prior Level of Function  IND    Living Environment  Social support: With family members   Type of home: House; Multi-level   Stairs to enter the home: Yes 5 Is there a railing: Yes     Ramp: No   Stairs inside the home: Yes 30 Is there a railing: Yes     Help at home: None  Equipment owned:       Employment: Yes Consultant/ teacher  Hobbies/Interests: Sailing, cooking, skiing, light indoor rock climbing, reading    Patient goals for therapy: Walk and gently run and walk steps without pain    Pain assessment: Pain present     Objective   FOOT/ANKLE EVALUATION  PAIN: Pain Level at Rest: 0/10  Pain Level with Use: 7/10  Pain Location: B ankles at achilles, soleus  Pain Quality: Aching, Burning, and Dull  Pain Frequency: intermittent or activity based  Pain is Worst: activity based   Pain is Exacerbated By: prolonged walking, descending stairs, movement/WB after being still   Pain is Relieved By: stretches, ibuprofen   Pain Progression: Worsened  INTEGUMENTARY (edema, incisions): B ankle swelling and warmth  POSTURE: WNL  GAIT:   Weightbearing Status: WBAT  Assistive Device(s): None  Gait Deviations: WFL  Toe out   BALANCE/PROPRIOCEPTION: Single Leg Stance Eyes Open (seconds): 30 sec B  WEIGHT BEARING ALIGNMENT: B foot pronation and flat foot  NON-WEIGHTBEARING  ALIGNMENT:    ROM:   AROM ankle DF 5 deg beyond neutral discomfort. PROM to neutral B, no worse pain passive  Pain: pain B achilles with WB   End feel: Firm    STRENGTH: WNL  FLEXIBILITY: limited calf and soleus B  SPECIAL TESTS:    Left Right   Tinel Sign Negative  Negative    Sutton Sign     Windlass Test Negative  Negative    Ligamentous Stability     Anterior Drawer Negative,   Negative,     Kleiger ER Test     Longitudinal Arch Angle Test     Talar Tilt Negative,   Negative,       FUNCTIONAL TESTS: walking painful with pushoff   PALPATION: TTP at B achilles, and soleus   JOINT MOBILITY: hypomobile midfoot, calcaneus     Assessment & Plan   CLINICAL IMPRESSIONS  Medical Diagnosis: M76.61, M76.62 (ICD-10-CM) - Achilles tendonitis, bilateral    Treatment Diagnosis: B achilles pain   Impression/Assessment: Patient is a 55 year old male with B achilles pain complaints.  The following significant findings have been identified: Pain, Decreased ROM/flexibility, Decreased joint mobility, Decreased strength, Impaired balance, Impaired gait, Impaired muscle performance, Decreased activity tolerance, and Impaired posture. These impairments interfere with their ability to perform self care tasks, work tasks, recreational activities, household chores, driving , household mobility, and community mobility as compared to previous level of function. Pt presents to PT with B ankle pain starting last year and persisting. Pain has been worsening limiting prolonged walking, getting up from transfers, descnding stairs. Upon eval restrictions into eccentric loading of calves/achilles, pain with WB and tenderness to palpation suggestive of achillies tendonitis. Additional influencing factors include decreased foot and ankle mobility, foot pronation. Pt would benefit from skilled PT to improve function.     Clinical Decision Making (Complexity):  Clinical Presentation: Stable/Uncomplicated  Clinical Presentation Rationale: based on  medical and personal factors listed in PT evaluation  Clinical Decision Making (Complexity): Low complexity    PLAN OF CARE  Treatment Interventions:  Interventions: Gait Training, Manual Therapy, Neuromuscular Re-education, Therapeutic Activity, Therapeutic Exercise, Self-Care/Home Management    Long Term Goals     PT Goal 1  Goal Identifier: HEP  Goal Description: Patient will be independent in self-management of condition and HEP to reach functional goals.  Target Date: 10/13/25  PT Goal 2  Goal Identifier: Walking  Goal Description: Pt will be able to walk for 30+ minutes with pain level <3/10 for return to physical activity  Target Date: 10/13/25  PT Goal 3  Goal Identifier: Stairs  Goal Description: Pt will able to descend stairs without pain to access home  Target Date: 10/13/25  PT Goal 4  Goal Identifier: Transfers  Goal Description: Pt will be able to get up from prolonged sitting without pain  Target Date: 10/13/25      Frequency of Treatment: 1x/wk  Duration of Treatment: 12 weeks    Recommended Referrals to Other Professionals:   Education Assessment:   Learner/Method: Patient  Education Comments: Verbalizes understanding    Risks and benefits of evaluation/treatment have been explained.   Patient/Family/caregiver agrees with Plan of Care.     Evaluation Time:     PT Eval, Low Complexity Minutes (79736): 30       Signing Clinician: WINSTON MCCOY, PT

## 2025-08-12 ENCOUNTER — THERAPY VISIT (OUTPATIENT)
Dept: PHYSICAL THERAPY | Facility: REHABILITATION | Age: 55
End: 2025-08-12
Attending: FAMILY MEDICINE
Payer: COMMERCIAL

## 2025-08-12 DIAGNOSIS — M76.61 ACHILLES TENDONITIS, BILATERAL: Primary | ICD-10-CM

## 2025-08-12 DIAGNOSIS — M76.62 ACHILLES TENDONITIS, BILATERAL: Primary | ICD-10-CM

## 2025-08-12 PROCEDURE — 97110 THERAPEUTIC EXERCISES: CPT | Mod: GP | Performed by: PHYSICAL THERAPIST

## 2025-08-12 PROCEDURE — 97140 MANUAL THERAPY 1/> REGIONS: CPT | Mod: GP | Performed by: PHYSICAL THERAPIST

## 2025-08-14 SDOH — HEALTH STABILITY: PHYSICAL HEALTH: ON AVERAGE, HOW MANY MINUTES DO YOU ENGAGE IN EXERCISE AT THIS LEVEL?: 30 MIN

## 2025-08-14 SDOH — HEALTH STABILITY: PHYSICAL HEALTH: ON AVERAGE, HOW MANY DAYS PER WEEK DO YOU ENGAGE IN MODERATE TO STRENUOUS EXERCISE (LIKE A BRISK WALK)?: 3 DAYS

## 2025-08-14 ASSESSMENT — SOCIAL DETERMINANTS OF HEALTH (SDOH): HOW OFTEN DO YOU GET TOGETHER WITH FRIENDS OR RELATIVES?: MORE THAN THREE TIMES A WEEK

## 2025-08-18 ENCOUNTER — OFFICE VISIT (OUTPATIENT)
Dept: FAMILY MEDICINE | Facility: CLINIC | Age: 55
End: 2025-08-18
Payer: COMMERCIAL

## 2025-08-18 VITALS
HEIGHT: 75 IN | TEMPERATURE: 97.7 F | DIASTOLIC BLOOD PRESSURE: 77 MMHG | SYSTOLIC BLOOD PRESSURE: 139 MMHG | HEART RATE: 52 BPM | RESPIRATION RATE: 17 BRPM | WEIGHT: 299.1 LBS | OXYGEN SATURATION: 99 % | BODY MASS INDEX: 37.19 KG/M2

## 2025-08-18 DIAGNOSIS — I25.10 CORONARY ARTERY DISEASE INVOLVING NATIVE CORONARY ARTERY OF NATIVE HEART WITHOUT ANGINA PECTORIS: ICD-10-CM

## 2025-08-18 DIAGNOSIS — E66.01 CLASS 2 SEVERE OBESITY DUE TO EXCESS CALORIES WITH SERIOUS COMORBIDITY AND BODY MASS INDEX (BMI) OF 36.0 TO 36.9 IN ADULT (H): ICD-10-CM

## 2025-08-18 DIAGNOSIS — I77.810 DILATED AORTIC ROOT: ICD-10-CM

## 2025-08-18 DIAGNOSIS — E66.812 CLASS 2 SEVERE OBESITY DUE TO EXCESS CALORIES WITH SERIOUS COMORBIDITY AND BODY MASS INDEX (BMI) OF 36.0 TO 36.9 IN ADULT (H): ICD-10-CM

## 2025-08-18 DIAGNOSIS — N52.9 VASCULOGENIC ERECTILE DYSFUNCTION, UNSPECIFIED VASCULOGENIC ERECTILE DYSFUNCTION TYPE: ICD-10-CM

## 2025-08-18 DIAGNOSIS — Z00.00 ROUTINE GENERAL MEDICAL EXAMINATION AT A HEALTH CARE FACILITY: Primary | ICD-10-CM

## 2025-08-18 RX ORDER — SILDENAFIL CITRATE 20 MG/1
20 TABLET ORAL DAILY
Qty: 90 TABLET | Refills: 1 | Status: SHIPPED | OUTPATIENT
Start: 2025-08-18

## 2025-08-18 ASSESSMENT — PAIN SCALES - GENERAL: PAINLEVEL_OUTOF10: MILD PAIN (1)

## 2025-08-19 ENCOUNTER — PATIENT OUTREACH (OUTPATIENT)
Dept: GASTROENTEROLOGY | Facility: CLINIC | Age: 55
End: 2025-08-19

## 2025-08-19 ENCOUNTER — THERAPY VISIT (OUTPATIENT)
Dept: PHYSICAL THERAPY | Facility: REHABILITATION | Age: 55
End: 2025-08-19
Attending: FAMILY MEDICINE
Payer: COMMERCIAL

## 2025-08-19 DIAGNOSIS — M76.62 ACHILLES TENDONITIS, BILATERAL: Primary | ICD-10-CM

## 2025-08-19 DIAGNOSIS — M76.61 ACHILLES TENDONITIS, BILATERAL: Primary | ICD-10-CM

## 2025-08-19 PROCEDURE — 97110 THERAPEUTIC EXERCISES: CPT | Mod: GP | Performed by: PHYSICAL THERAPIST

## 2025-08-19 PROCEDURE — 97140 MANUAL THERAPY 1/> REGIONS: CPT | Mod: GP | Performed by: PHYSICAL THERAPIST

## 2025-08-25 ENCOUNTER — THERAPY VISIT (OUTPATIENT)
Dept: PHYSICAL THERAPY | Facility: REHABILITATION | Age: 55
End: 2025-08-25
Payer: COMMERCIAL

## 2025-08-25 DIAGNOSIS — M76.61 ACHILLES TENDONITIS, BILATERAL: Primary | ICD-10-CM

## 2025-08-25 DIAGNOSIS — M76.62 ACHILLES TENDONITIS, BILATERAL: Primary | ICD-10-CM

## 2025-08-25 PROCEDURE — 97140 MANUAL THERAPY 1/> REGIONS: CPT | Mod: GP

## 2025-08-25 PROCEDURE — 97110 THERAPEUTIC EXERCISES: CPT | Mod: GP

## (undated) DEVICE — TUBING SUCTION 10'X3/16" N510

## (undated) DEVICE — DRAPE U SPLIT 74X120" 29440

## (undated) DEVICE — SPONGE COTTONOID 2X1/2" 80-1406

## (undated) DEVICE — SOL NACL 0.9% IRRIG 500ML BOTTLE 2F7123

## (undated) DEVICE — MANIFOLD KIT ANGIO AUTOMATED 014613

## (undated) DEVICE — INTRO SHEATH AVANTI 4FRX23CM 504604T

## (undated) DEVICE — NDL COUNTER 20CT 31142493

## (undated) DEVICE — BLADE KNIFE SURG 15 371115

## (undated) DEVICE — SUCTION MANIFOLD NEPTUNE 2 SYS 4 PORT 0702-020-000

## (undated) DEVICE — KIT HAND CONTROL ACIST 016795

## (undated) DEVICE — SHTH INTRO 0.021IN ID 6FR DIA

## (undated) DEVICE — PACK ENT ENDOSCOPY CUSTOM ASC

## (undated) DEVICE — SOL NACL 0.9% INJ 1000ML BAG 2B1324X

## (undated) DEVICE — PACK HEART LEFT CUSTOM

## (undated) DEVICE — NDL 25GA 2"  8881200441

## (undated) DEVICE — SYR 03ML LL W/O NDL 309657

## (undated) DEVICE — CATH ANGIO INFINITI 3DRC 4FRX100CM 538476

## (undated) DEVICE — CATH ANGIO INFINITI JL5 4FRX100CM 538422

## (undated) DEVICE — GLOVE PROTEXIS POWDER FREE SMT 7.5  2D72PT75X

## (undated) DEVICE — TUBING PRESSURE 30"

## (undated) DEVICE — BLADE TURBINATE INFERIOR 2MM ROTATE  1882040HR

## (undated) DEVICE — LINEN TOWEL PACK X5 5464

## (undated) DEVICE — CATH ANGIO INFINITI JL4 4FRX100CM 538420

## (undated) RX ORDER — FENTANYL CITRATE 50 UG/ML
INJECTION, SOLUTION INTRAMUSCULAR; INTRAVENOUS
Status: DISPENSED
Start: 2023-04-21

## (undated) RX ORDER — ASPIRIN 325 MG
TABLET ORAL
Status: DISPENSED
Start: 2023-04-21

## (undated) RX ORDER — ACETAMINOPHEN 325 MG/1
TABLET ORAL
Status: DISPENSED
Start: 2021-08-16

## (undated) RX ORDER — TRIAMCINOLONE ACETONIDE 40 MG/ML
INJECTION, SUSPENSION INTRA-ARTICULAR; INTRAMUSCULAR
Status: DISPENSED
Start: 2019-03-26

## (undated) RX ORDER — OFLOXACIN 3 MG/ML
SOLUTION/ DROPS OPHTHALMIC
Status: DISPENSED
Start: 2021-08-16

## (undated) RX ORDER — FENTANYL CITRATE-0.9 % NACL/PF 10 MCG/ML
PLASTIC BAG, INJECTION (ML) INTRAVENOUS
Status: DISPENSED
Start: 2021-08-16

## (undated) RX ORDER — OXYMETAZOLINE HYDROCHLORIDE 0.05 G/100ML
SPRAY NASAL
Status: DISPENSED
Start: 2021-08-16

## (undated) RX ORDER — NITROGLYCERIN 5 MG/ML
VIAL (ML) INTRAVENOUS
Status: DISPENSED
Start: 2023-04-21

## (undated) RX ORDER — LIDOCAINE HYDROCHLORIDE 10 MG/ML
INJECTION, SOLUTION EPIDURAL; INFILTRATION; INTRACAUDAL; PERINEURAL
Status: DISPENSED
Start: 2018-09-06

## (undated) RX ORDER — OXYCODONE HYDROCHLORIDE 5 MG/1
TABLET ORAL
Status: DISPENSED
Start: 2021-08-16

## (undated) RX ORDER — NICARDIPINE HCL-0.9% SOD CHLOR 1 MG/10 ML
SYRINGE (ML) INTRAVENOUS
Status: DISPENSED
Start: 2023-04-21

## (undated) RX ORDER — PROPOFOL 10 MG/ML
INJECTION, EMULSION INTRAVENOUS
Status: DISPENSED
Start: 2021-08-16

## (undated) RX ORDER — SODIUM CHLORIDE 9 MG/ML
INJECTION, SOLUTION INTRAVENOUS
Status: DISPENSED
Start: 2023-04-21

## (undated) RX ORDER — ONDANSETRON 2 MG/ML
INJECTION INTRAMUSCULAR; INTRAVENOUS
Status: DISPENSED
Start: 2023-04-21

## (undated) RX ORDER — DEXAMETHASONE SODIUM PHOSPHATE 10 MG/ML
INJECTION, SOLUTION INTRAMUSCULAR; INTRAVENOUS
Status: DISPENSED
Start: 2021-08-16

## (undated) RX ORDER — LIDOCAINE HYDROCHLORIDE 10 MG/ML
INJECTION, SOLUTION EPIDURAL; INFILTRATION; INTRACAUDAL; PERINEURAL
Status: DISPENSED
Start: 2023-04-21

## (undated) RX ORDER — ERYTHROMYCIN 5 MG/G
OINTMENT OPHTHALMIC
Status: DISPENSED
Start: 2023-06-05

## (undated) RX ORDER — FENTANYL CITRATE 50 UG/ML
INJECTION, SOLUTION INTRAMUSCULAR; INTRAVENOUS
Status: DISPENSED
Start: 2021-08-16

## (undated) RX ORDER — HEPARIN SODIUM 1000 [USP'U]/ML
INJECTION, SOLUTION INTRAVENOUS; SUBCUTANEOUS
Status: DISPENSED
Start: 2023-04-21

## (undated) RX ORDER — LIDOCAINE HYDROCHLORIDE AND EPINEPHRINE 10; 10 MG/ML; UG/ML
INJECTION, SOLUTION INFILTRATION; PERINEURAL
Status: DISPENSED
Start: 2021-08-16

## (undated) RX ORDER — ONDANSETRON 2 MG/ML
INJECTION INTRAMUSCULAR; INTRAVENOUS
Status: DISPENSED
Start: 2021-08-16

## (undated) RX ORDER — LIDOCAINE HYDROCHLORIDE 10 MG/ML
INJECTION, SOLUTION EPIDURAL; INFILTRATION; INTRACAUDAL; PERINEURAL
Status: DISPENSED
Start: 2019-03-26

## (undated) RX ORDER — LIDOCAINE HYDROCHLORIDE 20 MG/ML
INJECTION, SOLUTION EPIDURAL; INFILTRATION; INTRACAUDAL; PERINEURAL
Status: DISPENSED
Start: 2021-08-16

## (undated) RX ORDER — HYDROMORPHONE HYDROCHLORIDE 1 MG/ML
INJECTION, SOLUTION INTRAMUSCULAR; INTRAVENOUS; SUBCUTANEOUS
Status: DISPENSED
Start: 2021-08-16

## (undated) RX ORDER — EPHEDRINE SULFATE 50 MG/ML
INJECTION, SOLUTION INTRAMUSCULAR; INTRAVENOUS; SUBCUTANEOUS
Status: DISPENSED
Start: 2021-08-16